# Patient Record
Sex: MALE | Race: WHITE | NOT HISPANIC OR LATINO | Employment: OTHER | URBAN - METROPOLITAN AREA
[De-identification: names, ages, dates, MRNs, and addresses within clinical notes are randomized per-mention and may not be internally consistent; named-entity substitution may affect disease eponyms.]

---

## 2017-01-18 ENCOUNTER — OFFICE VISIT (OUTPATIENT)
Dept: DERMATOLOGY | Facility: CLINIC | Age: 82
End: 2017-01-18
Payer: MEDICARE

## 2017-01-18 VITALS — RESPIRATION RATE: 14 BRPM | BODY MASS INDEX: 23.39 KG/M2 | WEIGHT: 149 LBS | HEIGHT: 67 IN

## 2017-01-18 DIAGNOSIS — L21.9 SEBORRHEIC DERMATITIS: ICD-10-CM

## 2017-01-18 DIAGNOSIS — Z87.2 HISTORY OF ACTINIC KERATOSES: ICD-10-CM

## 2017-01-18 DIAGNOSIS — D48.5 NEOPLASM OF UNCERTAIN BEHAVIOR OF SKIN: Primary | ICD-10-CM

## 2017-01-18 PROCEDURE — 88305 TISSUE EXAM BY PATHOLOGIST: CPT | Mod: 26,,, | Performed by: PATHOLOGY

## 2017-01-18 PROCEDURE — 1159F MED LIST DOCD IN RCRD: CPT | Mod: S$GLB,,, | Performed by: DERMATOLOGY

## 2017-01-18 PROCEDURE — 1160F RVW MEDS BY RX/DR IN RCRD: CPT | Mod: S$GLB,,, | Performed by: DERMATOLOGY

## 2017-01-18 PROCEDURE — 1157F ADVNC CARE PLAN IN RCRD: CPT | Mod: S$GLB,,, | Performed by: DERMATOLOGY

## 2017-01-18 PROCEDURE — 99999 PR PBB SHADOW E&M-EST. PATIENT-LVL II: CPT | Mod: PBBFAC,,, | Performed by: DERMATOLOGY

## 2017-01-18 PROCEDURE — 99213 OFFICE O/P EST LOW 20 MIN: CPT | Mod: 25,S$GLB,, | Performed by: DERMATOLOGY

## 2017-01-18 PROCEDURE — 1126F AMNT PAIN NOTED NONE PRSNT: CPT | Mod: S$GLB,,, | Performed by: DERMATOLOGY

## 2017-01-18 PROCEDURE — 11100 PR BIOPSY OF SKIN LESION: CPT | Mod: S$GLB,,, | Performed by: DERMATOLOGY

## 2017-01-18 PROCEDURE — 88305 TISSUE EXAM BY PATHOLOGIST: CPT | Performed by: PATHOLOGY

## 2017-01-18 NOTE — PROGRESS NOTES
Subjective:       Patient ID:  Kashmir Parikh is a 84 y.o. male who presents for   Chief Complaint   Patient presents with    Follow-up     HPI Comments: Pt here for 6 week follow up suspected Actinic cheilitis, left lower lip -treated with cryo at last visit on 10-, lesion has not resolved. In fact lesion is large. No history oral HSV.     Ak on nose treated with cryo at last visit has resolved    Seborrheic dermatitis, beard using head & shoulders shampoo twice weekly. He has only used ketoconazole shampoo & ketoconazole 2 % cream; a few times, much improved        No phx skin ca       FINAL PATHOLOGIC DIAGNOSIS  Benign squamous mucosa with hyperkeratosis AND FOCAL PIGMENT INCONTINENCE  Diagnosed by: Jacob Ness M.D.  (Electronically Signed: 2013-05-03 10:24:59)  Gross Description  ID LABEL: Kashmir Parikh  AP LABEL: Kashmir Parikh  Received in formalin labeled with the patient's ID, is a 0.5 cm fragment of rubbery, gray to white tissue. Bisected  and entirely submitted.  Carmen Pascual     Developed at left lower lip vermilion border in Jan-Feb 2013, and mentioned it to Dr. Hernandez in April 2013. In May 2013 Dr. Carvalho biopsied it, and path report revealed benign squamous mucosa with hyperkeratosis. It opened up again 3 months later with biting into po-boy and Dr. Carvalho cauterized it with AgNO3. He returns today for persistent lesion at this site for nearly 4 years now.                           Review of Systems   Skin: Negative for daily sunscreen use, activity-related sunscreen use and sensitivity to antibiotic ointment.   Hematologic/Lymphatic: Bruises/bleeds easily.        Objective:    Physical Exam   Constitutional: He appears well-developed and well-nourished. No distress.   HENT:   Mouth/Throat:       Eyes: Lids are normal.  No conjunctival no injection.   Neurological: He is alert and oriented to person, place, and time. He is not disoriented.   Psychiatric: He has a normal mood and  affect.   Skin:   Areas Examined (abnormalities noted in diagram):   Head / Face Inspection Performed  Neck Inspection Performed  Chest / Axilla Inspection Performed  Back Inspection Performed  RUE Inspected  LUE Inspection Performed         Diagram Legend     Erythematous scaling macule/papule c/w actinic keratosis       Vascular papule c/w angioma      Pigmented verrucoid papule/plaque c/w seborrheic keratosis      Yellow umbilicated papule c/w sebaceous hyperplasia      Irregularly shaped tan macule c/w lentigo     1-2 mm smooth white papules consistent with Milia      Movable subcutaneous cyst with punctum c/w epidermal inclusion cyst      Subcutaneous movable cyst c/w pilar cyst      Firm pink to brown papule c/w dermatofibroma      Pedunculated fleshy papule(s) c/w skin tag(s)      Evenly pigmented macule c/w junctional nevus     Mildly variegated pigmented, slightly irregular-bordered macule c/w mildly atypical nevus      Flesh colored to evenly pigmented papule c/w intradermal nevus       Pink pearly papule/plaque c/w basal cell carcinoma      Erythematous hyperkeratotic cursted plaque c/w SCC      Surgical scar with no sign of skin cancer recurrence      Open and closed comedones      Inflammatory papules and pustules      Verrucoid papule consistent consistent with wart     Erythematous eczematous patches and plaques     Dystrophic onycholytic nail with subungual debris c/w onychomycosis     Umbilicated papule    Erythematous-base heme-crusted tan verrucoid plaque consistent with inflamed seborrheic keratosis     Erythematous Silvery Scaling Plaque c/w Psoriasis     See annotation      Assessment / Plan:      Pathology Orders:      Normal Orders This Visit    Tissue Specimen To Pathology, Dermatology     Questions:    Directional Terms:  Other(comment)    Clinical information:  SCC vs other    Specific Site:  left lower lip        Neoplasm of uncertain behavior of skin  -     Tissue Specimen To Pathology,  Dermatology    Shave biopsy procedure note:    Shave biopsy performed after verbal consent including risk of infection, scar, recurrence, need for additional treatment of site. Area prepped with alcohol, anesthetized with approximately 1.0cc of 1% lidocaine with epinephrine. Lesional tissue shaved with razor blade. Hemostasis achieved with application of aluminum chloride followed by hyfrecation. No complications. Dressing applied. Wound care explained.        Seborrheic dermatitis, face  Improved  Ok to continue keto shampoo and cream      History of actinic keratoses  Resolved s/p cryo at last visit            Return in about 6 months (around 7/18/2017).

## 2017-02-01 ENCOUNTER — INITIAL CONSULT (OUTPATIENT)
Dept: DERMATOLOGY | Facility: CLINIC | Age: 82
End: 2017-02-01
Payer: MEDICARE

## 2017-02-01 VITALS
BODY MASS INDEX: 22.76 KG/M2 | SYSTOLIC BLOOD PRESSURE: 140 MMHG | WEIGHT: 145 LBS | HEIGHT: 67 IN | DIASTOLIC BLOOD PRESSURE: 60 MMHG

## 2017-02-01 DIAGNOSIS — C44.02 SQUAMOUS CELL CARCINOMA, LIP: Primary | ICD-10-CM

## 2017-02-01 PROCEDURE — 1160F RVW MEDS BY RX/DR IN RCRD: CPT | Mod: S$GLB,,, | Performed by: DERMATOLOGY

## 2017-02-01 PROCEDURE — 99999 PR PBB SHADOW E&M-EST. PATIENT-LVL III: CPT | Mod: PBBFAC,,, | Performed by: DERMATOLOGY

## 2017-02-01 PROCEDURE — 3077F SYST BP >= 140 MM HG: CPT | Mod: S$GLB,,, | Performed by: DERMATOLOGY

## 2017-02-01 PROCEDURE — 1159F MED LIST DOCD IN RCRD: CPT | Mod: S$GLB,,, | Performed by: DERMATOLOGY

## 2017-02-01 PROCEDURE — 99213 OFFICE O/P EST LOW 20 MIN: CPT | Mod: S$GLB,,, | Performed by: DERMATOLOGY

## 2017-02-01 PROCEDURE — 1157F ADVNC CARE PLAN IN RCRD: CPT | Mod: S$GLB,,, | Performed by: DERMATOLOGY

## 2017-02-01 PROCEDURE — 3078F DIAST BP <80 MM HG: CPT | Mod: S$GLB,,, | Performed by: DERMATOLOGY

## 2017-02-01 RX ORDER — CEPHALEXIN 500 MG/1
CAPSULE ORAL
Qty: 4 CAPSULE | Refills: 0 | Status: SHIPPED | OUTPATIENT
Start: 2017-02-01 | End: 2017-10-20

## 2017-02-01 NOTE — LETTER
February 1, 2017      Komal Green MD  [address] [facility] [address] [place] [address]           [facility]  [address] [facility] [address] [place] [address]  Phone: [phone]          Patient: Kashmir Parikh   MR Number: [id number]   YOB: 1932   Date of Visit: 2/1/2017       Dear Dr. Komal Grene:    Thank you for referring Kashmir Parikh to me for evaluation. Attached you will find relevant portions of my assessment and plan of care.    If you have questions, please do not hesitate to call me. I look forward to following Kashmir Parikh along with you.    Sincerely,    Sujit Abdalla MD    Enclosure  CC:  No Recipients    If you would like to receive this communication electronically, please contact [email or web address] or [phone] to request more information on [employer] access.    For providers and/or their staff who would like to refer a patient to [facility], please contact us through our one-stop-shop provider referral line, [facility], at [phone].    If you feel you have received this communication in error or would no longer like to receive these types of communications, please e-mail [email or web address]

## 2017-02-01 NOTE — PROGRESS NOTES
ALLERGIES:  Toradol [ketorolac]; Pork/porcine containing products; and Shrimp    CHIEF COMPLAINT:  This 84 y.o. 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 Consultation requested by Komal Green MD.    The patient is accompanied to this visit by his wife.    HISTORY OF PRESENT ILLNESS:   Location: lower lip  Duration: 6 month or more  Quality: persistent  Context: status post biopsy by Komal Green MD; path = squamous cell carcinoma; pathology accession #[id number], [facility]    Prior Treatment: none    See also the handwritten notes/diagrams scanned to chart for additional details.    Defibrillator: No  Pacemaker: No  Artificial heart valves: Yes - Date: 2012; Porcine valve  Artificial joints: Yes; bilateral knee    REVIEW OF SYSTEMS:   General: general health fair  Skin: has no previous history of skin cancer(s)  CV: has hypertension, porcine aortic valve; no chest pain; has CHF  Resp: has exertional shortness of breath  Endo: has no diabetes  Hem/Lymph: taking prescribed anticoagulants-ASA and plavix, has no easy bruising/bleeding  Allergy/Immuno: has allergies as noted above  GI: has no history of hepatitis  MS: as noted above    PAST MEDICAL HISTORY:  Past Medical History   Diagnosis Date    BPH (benign prostatic hypertrophy)     Cataract      OU    CHF (congestive heart failure) 3/9/2016    Coronary artery disease     Diverticulitis     Gout, arthritis     HEARING LOSS     Heart murmur      asymptomatic    HTN (hypertension)      Pt states resolved    Hyperlipidemia     Myocardial infarction     Recurrent nephrolithiasis        PAST SURGICAL HISTORY:  Past Surgical History   Procedure Laterality Date    Rotator cuff repair       bilateral    Cholecystectomy      Carpal tunnel release  06/06     bilateral    Subtotal colectomy      Spinal cord stimulator implant   11/15/2011    Joint replacement       bilateral knee    Eye surgery       retinal detachment x 2    Hernia repair       umbilical    Hand surgery      Colonoscopy      Aortic valve replacement  12/05/2012     #21 [employer] Mosaic tissue valve    Cardiac surgery      Coronary angioplasty with stent placement      Retinal detachment surgery Bilateral 1980     RD spontaneous OU        SOCIAL HISTORY:  Dependencies: smoking status as noted below  Social History   Substance Use Topics    Smoking status: Former Smoker     Years: 10.00     Quit date: 8/7/1962    Smokeless tobacco: Never Used    Alcohol use Yes      Comment: occasional       PERTINENT MEDICATIONS:  See medications list.  Current Outpatient Prescriptions on File Prior to Visit   Medication Sig Dispense Refill    acetaminophen (TYLENOL) 325 MG tablet Take 1 tablet (325 mg total) by mouth every 6 (six) hours as needed for Pain.      aspirin 81 MG Chew Take 81 mg by mouth once daily.      atorvastatin (LIPITOR) 40 MG tablet TAKE ONE TABLET BY MOUTH AT BEDTIME FOR CHOLESTEROL 90 tablet 5    benazepril (LOTENSIN) 5 MG tablet Take 1 tablet (5 mg total) by mouth once daily. 90 tablet 3    carvedilol (COREG) 3.125 MG tablet TAKE ONE TABLET BY MOUTH TWO TIMES A DAY AS DIRECTED 180 tablet 4    clopidogrel (PLAVIX) 75 mg tablet Take 1 tablet (75 mg total) by mouth once daily. 30 tablet 11    colchicine 0.6 mg tablet TAKE ONE CAPSULE BY MOUTH ONCE DAILY FOR GOUT 90 tablet 1    docusate sodium (COLACE) 100 MG capsule Take 100 mg by mouth as needed for Constipation.      ketoconazole (NIZORAL) 2 % cream aaa face twice daily prn scaling and redness 60 g 3    ketoconazole (NIZORAL) 2 % shampoo Wash face/beard area with medicated shampoo 3x/week 120 mL 5    multivitamin capsule Take 1 capsule by mouth once daily.      omega-3 fatty acids Cap Take 1 capsule by mouth 2 (two) times daily.       oxycodone-acetaminophen (PERCOCET) 7.5-325 mg per tablet  TAKE TWO TABLETS BY MOUTH EVERY 4 HOURS AS NEEDED FOR PAIN 90 tablet 0    polyethylene glycol (GLYCOLAX) 17 gram/dose powder MIX 17-GRAMS (ONE CAPFUL) WITH 8-OUNCES WATER, JUICE OR CLEAR FLUID AND DRINK ONCE DAILY UP TO FOUR TIMES A DAY AS NEEDED TO PREVENT/RELIEVE 527 g 11    tamsulosin (FLOMAX) 0.4 mg Cp24 TAKE ONE CAPSULE BY MOUTH ONCE DAILY 90 capsule 3    zolpidem (AMBIEN) 5 MG Tab Take 1 tablet (5 mg total) by mouth nightly. 90 tablet 0     No current facility-administered medications on file prior to visit.        ALLERGIES:  Toradol [ketorolac]; Pork/porcine containing products; and Shrimp    EXAM:  See also the handwritten notes/diagrams scanned to chart for additional details.  Constitutional  General appearance: well-developed, well-nourished, well-kempt elderly white male    Eyes  Inspection of conjunctivae and lids reveals no abnormalities; sclerae anicteric  Neurologic/Psychiatric  Alert,  normal orientation to time, place, person  Normal mood and affect with n evidence of depression, anxiety, agitation  Skin: see photo(s)  Head: background moderate solar damage to exposed areas of skin; in addition, inspection/palpation reveals an approximately 8 mm eschar on the lower lip vermilion, with an adjacent area of leukokeratosis; he confirmed this as the site of the prior biopsy  Neck: examination reveals moderate chronic solar damage  Right upper extremity: examination reveals moderate chronic solar damage; no ecchymosis   Left upper extremity: examination reveals moderate chronic solar damage; no ecchymosis   Lymphatic  Palpation of lymph nodes of submental, submandibular, and cervical areas bilaterally reveals no palpable adenopathy    ASSESSMENT: 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 In particular, the discussion included, but was not limited to, the following:    One alternative at this point would be to defer further treatment and observe the lesion. With small skin cancers of this kind, it is possible that a biopsy can be sufficient to definitively treat a small skin cancer of this kind. Alternatively, some skin cancers are slow growing and do not require immediate treatment. The potential advantage of this choice would be to avoid the need for possibly unnecessary additional surgery. Among the potential disadvantages of this would be the possibility of enlargement of the lesion, more extensive spread of the lesion or recurrence at a later date, which might necessitate a larger and more complex surgery.    Radiation treatment can be an effective treatment for this type of skin cancer. The usual course of treatment is every weekday for several weeks. Local irritation will result from treatment, although no systemic side effects are expected. The potential advantage of radiation treatment is that it avoids the need for surgery. Among the disadvantages of radiation treatment are the length of treatment, the local inflammatory response, the absence of pathologic confirmation of the removal of the skin cancer, a possible increased risk of additional skin cancer in the treated area in later years, and a somewhat increased risk of recurrence at a later date.     Excisional surgery can be an effective treatment for this type of skin cancer. This would involve excision of the lesion with margin evaluation by submitting the specimen to a pathologist for either immediate marginal assessment via frozen section processing, or delayed marginal assessment  by fixed-tissue processing. The potential advantage of this technique is that it offers a way of treating the lesion with some degree of histologic confirmation of tumor removal. Among the disadvantages of this treatment are the possible need for re-excision if marginal involvement is identified, a somewhat greater likelihood of recurrence as compared to Mohs' surgery because of the less comprehensive margin evaluation inherent in the technique, and the general potential risks of surgery, including allergic reactions to the anesthetic and other materials used, infection, injury to nerves in the area with consequent loss of sensation or muscle function, and scarring or distortion of surrounding structures.    Mohs' surgery is a very effective treatment for this type of skin cancer. The potential advantage of Mohs' surgery is that this technique offers the greatest possible certainty of knowing that the skin cancer has been completely removed, with the removal of the least amount of normal tissue. The potential disadvantages of Mohs' surgery include the duration of the surgery, the possible need for a separate surgery for reconstruction following tumor removal, and scarring as a result. In addition, general potential risks of surgery as noted above also apply to treatment via Mohs' surgery.    In light of the nature of this tumor and the location on the lipin an area of increased risk of recurrence,  Mohs' micrographic surgery was thought to be the most appropriate management choice, and this diagnosis is appropriate for treatment by Mohs' micrographic surgery.     Sufficient time was available for questions, and all questions were answered to his satisfaction. He fully understands the aims, risks, alternatives, and possible complications, and has elected to proceed with the surgery, and verbally consented to do so. The procedure will be scheduled in the near future.    Routine pre-op instructions were given to  him.    Given his history of valve replacement and the location on the lip margin, I will prescribe him cephalexin 2gm to take one hour prior to surgery.     The patient was instructed to continue ASA and plavix prior to surgery.    --------------------------------------  Note: some or all of this note may have been generated using voice recognition software and thus may contain grammatical and/or spelling errors.

## 2017-02-03 RX ORDER — OXYCODONE AND ACETAMINOPHEN 7.5; 325 MG/1; MG/1
TABLET ORAL
Qty: 90 TABLET | Refills: 0 | Status: SHIPPED | OUTPATIENT
Start: 2017-02-03 | End: 2017-03-22 | Stop reason: SDUPTHER

## 2017-02-10 ENCOUNTER — PROCEDURE VISIT (OUTPATIENT)
Dept: DERMATOLOGY | Facility: CLINIC | Age: 82
End: 2017-02-10
Payer: MEDICARE

## 2017-02-10 VITALS
DIASTOLIC BLOOD PRESSURE: 49 MMHG | BODY MASS INDEX: 21.98 KG/M2 | WEIGHT: 145 LBS | HEIGHT: 68 IN | SYSTOLIC BLOOD PRESSURE: 107 MMHG | HEART RATE: 68 BPM

## 2017-02-10 DIAGNOSIS — C44.02 SQUAMOUS CELL CARCINOMA, LIP: Primary | ICD-10-CM

## 2017-02-10 PROCEDURE — 17312 MOHS ADDL STAGE: CPT | Mod: S$GLB,,, | Performed by: DERMATOLOGY

## 2017-02-10 PROCEDURE — 99499 UNLISTED E&M SERVICE: CPT | Mod: S$GLB,,, | Performed by: DERMATOLOGY

## 2017-02-10 PROCEDURE — 17311 MOHS 1 STAGE H/N/HF/G: CPT | Mod: S$GLB,,, | Performed by: DERMATOLOGY

## 2017-02-10 PROCEDURE — 40500 PARTIAL EXCISION OF LIP: CPT | Mod: 51,S$GLB,, | Performed by: DERMATOLOGY

## 2017-02-10 NOTE — PROGRESS NOTES
ALLERGIES:  Toradol [ketorolac]; Pork/porcine containing products; and Shrimp  -------------------------------------------------------------  PROCEDURE: Mohs' Micrographic Surgery    SITE: lower lip    INDICATION: squamous cell carcinoma in an area at increased risk of recurrence    CASE NUMBER: [id number]      ANESTHETIC: 4.0 mL 1% Lidocaine with Epinephrine 1:100,000, buffered    SURGICAL PREP: Ethanol and Hibiclens    SURGEON: Sujit Abdalla MD    ASSISTANTS: Candy Alexander CST     PREOPERATIVE DIAGNOSIS: squamous cell carcinoma     POSTOPERATIVE DIAGNOSIS: squamous cell carcinoma     PATHOLOGIC DIAGNOSIS: squamous cell carcinoma     STAGES OF MOHS' SURGERY PERFORMED: two    TUMOR-FREE PLANE ACHIEVED: yes    HEMOSTASIS: Hyfrecation     SPECIMENS: three (one in stage A, two in stage B)    INITIAL LESION SIZE: 0.7 x 1.6 cm    FINAL DEFECT SIZE: 1.1 x 3.9 cm    WOUND REPAIR/DISPOSITION: see below    NARRATIVE:    The patient is a 84 y.o.male referred by Komal Green MD with a history of cancer on the left lower lip which was biopsied - pathology accession #[id number], [facility]. Findings revealed squamous cell carcinoma. Examination revealed a somewhat sclerotic plaque on the left lower vermilion of the lip at the site of prior biopsy, which was confirmed by reference to the photograph taken at the previous patient visit; to the right of this is an area of leukokeratosis extending across the vermilion. In light of the nature of this tumor and the location on the lip, Mohs' micrographic surgery was thought to be the most appropriate management choice, and this diagnosis is appropriate for treatment by Mohs' micrographic surgery.  I discussed it with the patient and he fully understands the aims, risks, alternatives, and possible complications, and elects to proceed.  There are no medical or surgical contraindications to the procedure.     A signed informed consent was obtained.    PROCEDURE:  The  "patient was placed in the semi-recumbent position on the operating table in the Mohs' Surgery Suite. The area in question was thoroughly prepped with ethanol and Hibiclens. A sterile surgical marker was used to outline the clinically apparent margins of the involved area, and a narrow margin of normal-appearing skin. Reference marks were made at the periphery of the outlined area with the surgical marker. The proposed area of excision was measured and photographed. Local anesthesia of 1% Lidocaine with 1:100,000 epinephrine, buffered with sodium bicarbonate, was administered.  The total volume of anesthetic used throughout this portion of the procedure was as documented above. The area was prepared and draped in the standard manner. All of the grossly identifiable area of clinically abnormal tissue and an underlying/peripheral layer was taken and processed by the Mohs' technique.  Hemostasis was obtained with the hyfrecator. Tissue was taken from any areas of residual marginal involvement (if present) and processed by the Mohs' technique in as many stages as needed until a tumor-free plane was achieved.    Colors of inks used in the reference nicks at epidermal margins (if present) and/or inking of non-epithelial edges, if applicable, is represented on the Mohs map as follows: solid lines represent red ink, dots represent blue ink, jagged lines represent black ink, curlicues represent green ink, "xxx" represents yellow ink.    The first Mohs' layer consisted of one section(s) with 3 slide(s) evaluated. Histology of the specimen(s) showed some residual scar tissue on the mucosal side near the red-inked nick, and an adjacent tissue tear, as noted on the Mohs map; on the opposite site, adjacen to the black nick, there was some residual superficially invasive squamous cell carcinoma vs actinic cheilitis.     The second Mohs' layer consisted of two section(s) with 5 slide(s) evaluated. No residual tumor was noted at the " margins of the second Mohs' layer. Histology of the specimen(s) showed chronic solar changes.    A total of three section(s) and 8 slide(s) were examined under the microscope via the Mohs technique.  A cancer free plane was reached after layer number two. Defect final size was as noted above.      The wound was covered with a nonadherent dressing between stages, and the patient allowed to wait in the waiting area during these periods. The final defect was photographed at the completion of the Mohs' procedure.    See the separate procedure note which follows regarding repair of the defect following Mohs' surgery.       -----------------------------------------------    REPAIR FOLLOWING MOHS' MICROGRAPHIC SURGERY    PREOPERATIVE DIAGNOSIS: defect following Mohs' surgery for a squamous cell carcinoma    POSTOPERATIVE DIAGNOSIS: same    PROCEDURE PERFORMED: vermilionectomy of residual vermilion and mucosal advancement    ANESTHETIC: 3 mL 1% Lidocaine with Epinephrine 1:100,000, buffered     SURGICAL PREP: Hibiclens    SURGEON: Sujit Abdalla MD     ASSISTANTS: as above    LOCATION: lower lip      INDICATIONS:  Earlier in the day, the patient underwent Mohs' micrographic surgical excision of a squamous cell carcinoma on the vermilion of the lower lip. Tumor free margins were achieved after layer number two.  Later in the day, the management of the resulting wound was addressed with the patient. I discussed the various wound management options with the patient and he fully understands the aims, risks, alternatives, and possible complications of the alternatives, and he elects to proceed with closure of the defect in the manner noted below.  There are no medical or surgical contraindications to the procedure.    A signed informed consent was previously obtained.    PROCEDURE:  Repair via vermilionectomy and mucosal advancement closure:  The patient was returned to the procedure room following completion of the Mohs'  procedure and final slide review. Because of the size, shape and location of the defect, simple closure could not be achieved without possible distortion of surrounding structures, excessive tension on the wound margins and an unacceptable risk of wound dehiscence, and the creation of standing cone deformities.     Consideration was given the the site of the wound, the surrounding structures, and the orientation of closure necessary to provide the optimal functional and cosmetic outcome. After devoting time to these considerations, and to the orientation of the vectors of maximal skin tension surrounding the defect, and after discussing the options for wound management, a completion vermilionectomy and mucosal advancement flap was chosen to repair the area. The area was prepped again and triangles of residual vermilion on the exposed mucosal lip were outlined with a sterile surgical marker, to minimize tension across the wound. Additional anesthetic was infiltrated into the tissues surrounding the defect and the anticipated area of repair, to maintain anesthesia during the procedure.Preparation of the site for closure was then carried out by extending the defect through excision of triangles of superfluous tissue on either side of the wound to square the shoulders of the defect and to allow closure without distortion by standing cone deformities, to achieve removal of the entire exposed vermilion of the lower lip. The mucosal margin of the wound was undermined sufficiently to permit advancement of the mucosal lip to the vermilion border/cutaneous lip margin. After hemostasis was achieved with the hyfrecator, closure was accomplished with:      multiple #4-0 buried interrupted Vicryl suture(s) and    multiple #4-0 simple interrupted silk suture(s) for final approximation of the wound margins.    The site was photographed following completion of the repair. Final dressing consisted of petrolatum, Telfa and  tape.    Estimated blood loss for the total procedure was less than 10 mL.    Total operative time including tissue processing in the Mohs' laboratory and microscopic Mohs' frozen section slide review was 3 hour(s). Verbal and written wound care instructions were given to the patient, and he expressed understanding of these instructions. The patient tolerated the procedure well and left the operating room in good condition; he is to return in 7 days for suture removal.     Medications previously prescribed for the patient were: cephalexin 2 gm, which he took one hour prior to the procedure.    Dr. Abdalla's cell phone number was given to the patient with instructions to call prn with any problems.

## 2017-02-17 ENCOUNTER — OFFICE VISIT (OUTPATIENT)
Dept: DERMATOLOGY | Facility: CLINIC | Age: 82
End: 2017-02-17
Payer: MEDICARE

## 2017-02-17 DIAGNOSIS — Z48.02 VISIT FOR SUTURE REMOVAL: Primary | ICD-10-CM

## 2017-02-17 PROCEDURE — 99024 POSTOP FOLLOW-UP VISIT: CPT | Mod: S$GLB,,, | Performed by: DERMATOLOGY

## 2017-02-17 PROCEDURE — 99999 PR PBB SHADOW E&M-EST. PATIENT-LVL III: CPT | Mod: PBBFAC,,, | Performed by: DERMATOLOGY

## 2017-02-17 NOTE — PROGRESS NOTES
CC: 84 y.o.male patient is here for suture removal.     HPI: Patient is one week(s) s/p Mohs' micrographic surgery, fresh tissue technique of a squamous cell carcinoma on the lower lip, with subsequent repair   Patient reports no problems.    EXAM:  Sutures intact.  Wound healing well.  Good approximation of skin edges.  No undue erythema to surrounding skin or signs or symptoms of infection.    IMPRESSION:  Healing well post Mohs' micrographic surgery and repair    PLAN:  Site cleaned with peroxide, sutures removed  Dressed with petrolatum   Reviewed further care and expected course  Followup 4 weeks; call prn sooner

## 2017-02-24 ENCOUNTER — OFFICE VISIT (OUTPATIENT)
Dept: CARDIOLOGY | Facility: CLINIC | Age: 82
End: 2017-02-24
Payer: MEDICARE

## 2017-02-24 VITALS
BODY MASS INDEX: 22.02 KG/M2 | HEIGHT: 68 IN | DIASTOLIC BLOOD PRESSURE: 51 MMHG | HEART RATE: 61 BPM | SYSTOLIC BLOOD PRESSURE: 118 MMHG | WEIGHT: 145.31 LBS

## 2017-02-24 DIAGNOSIS — I77.9 BILATERAL CAROTID ARTERY DISEASE: ICD-10-CM

## 2017-02-24 DIAGNOSIS — I10 ESSENTIAL HYPERTENSION: ICD-10-CM

## 2017-02-24 DIAGNOSIS — E78.5 DYSLIPIDEMIA: ICD-10-CM

## 2017-02-24 DIAGNOSIS — Z95.2 S/P AVR (AORTIC VALVE REPLACEMENT): Primary | ICD-10-CM

## 2017-02-24 DIAGNOSIS — I25.10 CORONARY ARTERY DISEASE DUE TO LIPID RICH PLAQUE: ICD-10-CM

## 2017-02-24 DIAGNOSIS — I25.83 CORONARY ARTERY DISEASE DUE TO LIPID RICH PLAQUE: ICD-10-CM

## 2017-02-24 DIAGNOSIS — Z95.1 S/P CABG X 1: ICD-10-CM

## 2017-02-24 PROCEDURE — 99214 OFFICE O/P EST MOD 30 MIN: CPT | Mod: S$GLB,,, | Performed by: INTERNAL MEDICINE

## 2017-02-24 PROCEDURE — 1126F AMNT PAIN NOTED NONE PRSNT: CPT | Mod: S$GLB,,, | Performed by: INTERNAL MEDICINE

## 2017-02-24 PROCEDURE — 3074F SYST BP LT 130 MM HG: CPT | Mod: S$GLB,,, | Performed by: INTERNAL MEDICINE

## 2017-02-24 PROCEDURE — 1159F MED LIST DOCD IN RCRD: CPT | Mod: S$GLB,,, | Performed by: INTERNAL MEDICINE

## 2017-02-24 PROCEDURE — 3078F DIAST BP <80 MM HG: CPT | Mod: S$GLB,,, | Performed by: INTERNAL MEDICINE

## 2017-02-24 PROCEDURE — 99999 PR PBB SHADOW E&M-EST. PATIENT-LVL III: CPT | Mod: PBBFAC,,, | Performed by: INTERNAL MEDICINE

## 2017-02-24 PROCEDURE — 1157F ADVNC CARE PLAN IN RCRD: CPT | Mod: S$GLB,,, | Performed by: INTERNAL MEDICINE

## 2017-02-24 PROCEDURE — 99499 UNLISTED E&M SERVICE: CPT | Mod: S$GLB,,, | Performed by: INTERNAL MEDICINE

## 2017-02-24 PROCEDURE — 1160F RVW MEDS BY RX/DR IN RCRD: CPT | Mod: S$GLB,,, | Performed by: INTERNAL MEDICINE

## 2017-02-24 NOTE — PROGRESS NOTES
Subjective:    Patient ID:  Kashmir Parikh is a 84 y.o. male who presents for follow-up of Hypertension (9 month f/u ); Congestive Heart Failure; and Coronary Artery Disease      HPI   Here for f/u of AVR/CABG. Patients states is doing well no chest pain, SOB or change in exertional tolerence. Patient does not exercise but remains very active with out change in exertional tolerance or chest pain. Stable HANSEN.    Review of Systems   Constitution: Negative for decreased appetite and malaise/fatigue.   HENT: Negative for congestion and nosebleeds.    Eyes: Negative for blurred vision.   Cardiovascular: Negative for chest pain, claudication, cyanosis, dyspnea on exertion, irregular heartbeat, leg swelling, near-syncope, orthopnea, palpitations, paroxysmal nocturnal dyspnea and syncope.   Respiratory: Negative for cough and shortness of breath.    Endocrine: Negative for polyuria.   Hematologic/Lymphatic: Does not bruise/bleed easily.   Musculoskeletal: Negative for back pain, falls, joint pain, joint swelling, muscle cramps, muscle weakness and myalgias.   Gastrointestinal: Negative for bloating, abdominal pain, change in bowel habit, nausea and vomiting.   Genitourinary: Negative for urgency.   Neurological: Negative for dizziness, focal weakness and light-headedness.   Psychiatric/Behavioral: Negative for altered mental status.        Objective:    Physical Exam   Cardiovascular:   Murmur heard.   Harsh midsystolic murmur is present with a grade of 2/6  at the upper right sternal border radiating to the neck  Well healed midline sternal incision.               ..    Chemistry        Component Value Date/Time     03/18/2016 0606    K 4.0 03/18/2016 0606     03/18/2016 0606    CO2 31 (H) 03/18/2016 0606    BUN 21 03/18/2016 0606    CREATININE 0.76 03/18/2016 0606     (H) 03/18/2016 0606        Component Value Date/Time    CALCIUM 9.9 03/18/2016 0606    ALKPHOS 81 03/08/2016 2105    AST 28 03/08/2016  2105    ALT 26 03/08/2016 2105    BILITOT 0.5 03/08/2016 2105            ..  Lab Results   Component Value Date    CHOL 154 08/18/2015    CHOL 138 09/02/2014    CHOL 143 10/01/2013     Lab Results   Component Value Date    HDL 37 (L) 08/18/2015    HDL 35 (L) 09/02/2014    HDL 32 (L) 10/01/2013     Lab Results   Component Value Date    LDLCALC 89.0 08/18/2015    LDLCALC 68.2 09/02/2014    LDLCALC 87.6 10/01/2013     Lab Results   Component Value Date    TRIG 140 08/18/2015    TRIG 174 (H) 09/02/2014    TRIG 117 10/01/2013     Lab Results   Component Value Date    CHOLHDL 24.0 08/18/2015    CHOLHDL 25.4 09/02/2014    CHOLHDL 22.4 10/01/2013     ..  Lab Results   Component Value Date    WBC 6.42 03/18/2016    HGB 13.7 (L) 03/18/2016    HCT 41.6 03/18/2016    MCV 95 03/18/2016     03/18/2016       Test(s) Reviewed  I have reviewed the following in detail:  [] Stress test   [] Angiography   [x] Echocardiogram   [x] Labs   [] Other:       Assessment:         ICD-10-CM ICD-9-CM   1. S/P AVR (aortic valve replacement) Z95.2 V43.3   2. S/P CABG x 1 Z95.1 V45.81   3. Dyslipidemia E78.5 272.4   4. Essential hypertension I10 401.9   5. Bilateral carotid artery disease I77.9 447.9   6. Coronary artery disease due to lipid rich plaque I25.10 414.00    I25.83 414.3     Problem List Items Addressed This Visit     Bilateral carotid artery disease    CAD (coronary artery disease)    Overview     4/2016 failed  of LAD  12/12 LAD-35%, OM 75%, small RCA         Dyslipidemia    Essential hypertension    S/P AVR (aortic valve replacement) - Primary    Overview     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 mosaic         S/P CABG x 1    Overview     12/12 VG-OM                Plan:           Return to clinic 6 months   Low level/low impact aerobic exercise 5x's/wk. Heart healthy diet and risk factor modification.    See labs and med orders.  Orders Placed This Encounter   Procedures    Lipid panel    Comprehensive metabolic panel    CBC auto  differential    TSH    CAR Ultrasound doppler carotid bliateral    2D echo with color flow doppler

## 2017-02-24 NOTE — MR AVS SNAPSHOT
[facility]  [address] [facility] [address]  [facility] [address]  Phone: [phone]                  Kashmir Parikh   2017 11:20 AM   Office Visit    Description:  Male : 10/18/1932   Provider:  Tanner Galvan MD   Department:  [place] [facility]           Reason for Visit     Hypertension     Congestive Heart Failure     Coronary Artery Disease           Diagnoses this Visit        Comments    S/P AVR (aortic valve replacement)    -  Primary     S/P CABG x 1         Dyslipidemia         Essential hypertension         Bilateral carotid artery disease         Coronary artery disease due to lipid rich plaque                To Do List           Future Appointments        Provider Department Dept Phone    3/6/2017 2:00 PM ARGELIA Natarajan MD [facility] [phone]    3/15/2017 3:15 PM Sujit Abdalla MD [facility] [phone]    2017 11:00 AM ANALILIA Hassan OD [facility] [facility] [phone]    2017 4:30 PM Orlin Mcclure Jr., MD [facility] [phone]      Goals (5 Years of Data)     None      Follow-Up and Disposition     Return in about 6 months (around 2017).      [facility]     [facility] -  Assistance  Registered nurses in the [facility] provide clinical advisement, health education, appointment booking, and other advisory services.  Call for this free service at [phone].             Medications           Message regarding Medications     Verify the changes and/or additions to your medication regime listed below are the same as discussed with your clinician today.  If any of these changes or additions are incorrect, please notify your healthcare provider.             Verify that the below list of medications is an accurate representation of the medications you are currently taking.  If none reported, the list may be blank. If incorrect, please contact your healthcare  provider. Carry this list with you in case of emergency.           Current Medications     acetaminophen (TYLENOL) 325 MG tablet Take 1 tablet (325 mg total) by mouth every 6 (six) hours as needed for Pain.    aspirin 81 MG Chew Take 81 mg by mouth once daily.    atorvastatin (LIPITOR) 40 MG tablet TAKE ONE TABLET BY MOUTH AT BEDTIME FOR CHOLESTEROL    benazepril (LOTENSIN) 5 MG tablet Take 1 tablet (5 mg total) by mouth once daily.    carvedilol (COREG) 3.125 MG tablet TAKE ONE TABLET BY MOUTH TWO TIMES A DAY AS DIRECTED    cephALEXin (KEFLEX) 500 MG capsule Take four by mouth one hour prior to surgery    clopidogrel (PLAVIX) 75 mg tablet Take 1 tablet (75 mg total) by mouth once daily.    colchicine 0.6 mg tablet TAKE ONE CAPSULE BY MOUTH ONCE DAILY FOR GOUT    docusate sodium (COLACE) 100 MG capsule Take 100 mg by mouth as needed for Constipation.    ketoconazole (NIZORAL) 2 % cream aaa face twice daily prn scaling and redness    ketoconazole (NIZORAL) 2 % shampoo Wash face/beard area with medicated shampoo 3x/week    multivitamin capsule Take 1 capsule by mouth once daily.    omega-3 fatty acids Cap Take 1 capsule by mouth 2 (two) times daily.     oxycodone-acetaminophen (PERCOCET) 7.5-325 mg per tablet TAKE TWO TABLETS BY MOUTH EVERY 4 HOURS AS NEEDED FOR PAIN    polyethylene glycol (GLYCOLAX) 17 gram/dose powder MIX 17-GRAMS (ONE CAPFUL) WITH 8-OUNCES WATER, JUICE OR CLEAR FLUID AND DRINK ONCE DAILY UP TO FOUR TIMES A DAY AS NEEDED TO PREVENT/RELIEVE    tamsulosin (FLOMAX) 0.4 mg Cp24 TAKE ONE CAPSULE BY MOUTH ONCE DAILY    zolpidem (AMBIEN) 5 MG Tab Take 1 tablet (5 mg total) by mouth nightly.           Clinical Reference Information           Your Vitals Were     BP                   118/51 (BP Location: Left arm, Patient Position: Sitting, BP Method: Automatic)           Blood Pressure          Most Recent Value    BP  (!)  118/51      Allergies as of 2/24/2017     Toradol [Ketorolac]    Pork/porcine  Containing Products    Shrimp      Immunizations Administered on Date of Encounter - 2/24/2017     None      Orders Placed During Today's Visit     Future Labs/Procedures Expected by Expires    2D echo with color flow doppler  5/24/2017 2/25/2018    CAR Ultrasound doppler carotid bliateral  5/24/2017 2/24/2018    CBC auto differential  5/24/2017 4/25/2018    Comprehensive metabolic panel  5/24/2017 2/25/2018    Lipid panel  5/24/2017 2/25/2018    TSH  5/24/2017 2/25/2018      Language Assistance Services     ATTENTION: Language assistance services are available, free of charge. Please call [phone].      ATENCIÓN: Si habla español, tiene a meehan disposición servicios gratuitos de asistencia lingüística. Llame al [phone].     CHÚ Ý: N?u b?n nói Ti?ng Vi?t, có các d?ch v? h? tr? ngôn ng? mi?n phí dành cho b?n. G?i s? [phone].         [facility] complies with applicable Federal civil rights laws and does not discriminate on the basis of race, color, national origin, age, disability, or sex.

## 2017-03-06 ENCOUNTER — OFFICE VISIT (OUTPATIENT)
Dept: OPHTHALMOLOGY | Facility: CLINIC | Age: 82
End: 2017-03-06
Payer: MEDICARE

## 2017-03-06 DIAGNOSIS — H35.3220 EXUDATIVE AGE-RELATED MACULAR DEGENERATION OF LEFT EYE: Primary | ICD-10-CM

## 2017-03-06 DIAGNOSIS — H33.003 RETINAL DETACHMENT OF BOTH EYES WITH RETINAL BREAK: ICD-10-CM

## 2017-03-06 DIAGNOSIS — H25.13 NS (NUCLEAR SCLEROSIS), BILATERAL: ICD-10-CM

## 2017-03-06 DIAGNOSIS — H43.11 VITREOUS HEMORRHAGE, RIGHT EYE: ICD-10-CM

## 2017-03-06 PROCEDURE — 99999 PR PBB SHADOW E&M-EST. PATIENT-LVL III: CPT | Mod: PBBFAC,,, | Performed by: OPHTHALMOLOGY

## 2017-03-06 PROCEDURE — 92134 CPTRZ OPH DX IMG PST SGM RTA: CPT | Mod: S$GLB,,, | Performed by: OPHTHALMOLOGY

## 2017-03-06 PROCEDURE — 99499 UNLISTED E&M SERVICE: CPT | Mod: S$GLB,,, | Performed by: OPHTHALMOLOGY

## 2017-03-06 PROCEDURE — 92014 COMPRE OPH EXAM EST PT 1/>: CPT | Mod: S$GLB,,, | Performed by: OPHTHALMOLOGY

## 2017-03-06 PROCEDURE — 92226 PR SPECIAL EYE EXAM, SUBSEQUENT: CPT | Mod: 50,S$GLB,, | Performed by: OPHTHALMOLOGY

## 2017-03-06 NOTE — PROGRESS NOTES
OCT - OD - thinning  OS - PP HE improving    FA - NO active CNVM OS - stable fibrosis with staining    A/P      1. RD OD  S/p repair with Dr. Alexandre 1980's  With SB segment, cryo/laser  Internalized mersilene suture  Also with VH - resolving    RD precautions    2. VH OD   ?from around mersilene  Although BP was elevated and sneezing    Will check FA on FU    3. S/p Cryo OS    4. NS OU  Pt comfortable currently    5. Wet AMD OS  With PPCNMV - not currently foveal threatening, with some fibrosis, will monitor        6 months OCT

## 2017-03-06 NOTE — MR AVS SNAPSHOT
[place] [facility]  [address]  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         Vitreous hemorrhage, right eye         NS (nuclear sclerosis), bilateral                To Do List           Future Appointments        Provider Department Dept Phone    3/15/2017 3:15 PM Sujit Abdalla MD [facility] [phone]    2017 11:00 AM ANALILIA Hassan OD [facility] Opt[facility] [phone]    2017 4:30 PM Orlin Mcclure Jr., MD [facility] [phone]    2017 8:35 AM LAB[facility] [place] [facility] [phone]    2017 1:00 PM VASCULAR [facility] [phone]      Goals (5 Years of Data)     None      Follow-Up and Disposition     Return in about 6 months (around 2017).      [facility]     [facility] -  Assistance  Registered nurses in the [facility] provide clinical advisement, health education, appointment booking, and other advisory services.  Call for this free service at [phone].             Medications           Message regarding Medications     Verify the changes and/or additions to your medication regime listed below are the same as discussed with your clinician today.  If any of these changes or additions are incorrect, please notify your healthcare provider.             Verify that the below list of medications is an accurate representation of the medications you are currently taking.  If none reported, the list may be blank. If incorrect, please  contact your healthcare provider. Carry this list with you in case of emergency.           Current Medications     acetaminophen (TYLENOL) 325 MG tablet Take 1 tablet (325 mg total) by mouth every 6 (six) hours as needed for Pain.    aspirin 81 MG Chew Take 81 mg by mouth once daily.    atorvastatin (LIPITOR) 40 MG tablet TAKE ONE TABLET BY MOUTH AT BEDTIME FOR CHOLESTEROL    benazepril (LOTENSIN) 5 MG tablet Take 1 tablet (5 mg total) by mouth once daily.    carvedilol (COREG) 3.125 MG tablet TAKE ONE TABLET BY MOUTH TWO TIMES A DAY AS DIRECTED    cephALEXin (KEFLEX) 500 MG capsule Take four by mouth one hour prior to surgery    clopidogrel (PLAVIX) 75 mg tablet Take 1 tablet (75 mg total) by mouth once daily.    colchicine 0.6 mg tablet TAKE ONE CAPSULE BY MOUTH ONCE DAILY FOR GOUT    docusate sodium (COLACE) 100 MG capsule Take 100 mg by mouth as needed for Constipation.    ketoconazole (NIZORAL) 2 % cream aaa face twice daily prn scaling and redness    ketoconazole (NIZORAL) 2 % shampoo Wash face/beard area with medicated shampoo 3x/week    multivitamin capsule Take 1 capsule by mouth once daily.    omega-3 fatty acids Cap Take 1 capsule by mouth 2 (two) times daily.     oxycodone-acetaminophen (PERCOCET) 7.5-325 mg per tablet TAKE TWO TABLETS BY MOUTH EVERY 4 HOURS AS NEEDED FOR PAIN    polyethylene glycol (GLYCOLAX) 17 gram/dose powder MIX 17-GRAMS (ONE CAPFUL) WITH 8-OUNCES WATER, JUICE OR CLEAR FLUID AND DRINK ONCE DAILY UP TO FOUR TIMES A DAY AS NEEDED TO PREVENT/RELIEVE    tamsulosin (FLOMAX) 0.4 mg Cp24 TAKE ONE CAPSULE BY MOUTH ONCE DAILY    zolpidem (AMBIEN) 5 MG Tab Take 1 tablet (5 mg total) by mouth nightly.           Clinical Reference Information           Allergies as of 3/6/2017     Toradol [Ketorolac]    Pork/porcine Containing Products    Shrimp      Immunizations Administered on Date of Encounter - 3/6/2017     None      Orders Placed During Today's Visit      Normal Orders This Visit     Posterior Segment OCT Retina-Both eyes     Future Labs/Procedures Expected by Expires    Posterior Segment OCT Retina-Both eyes  As directed 3/6/2018      Language Assistance Services     ATTENTION: Language assistance services are available, free of charge. Please call [phone].      ATENCIÓN: Si jose dubose, tiene a meehan disposición servicios gratuitos de asistencia lingüística. Llame al [phone].     CHÚ Ý: N?u b?n nói Ti?ng Vi?t, có các d?ch v? h? tr? ngôn ng? mi?n phí dành cho b?n. G?i s? [phone].         [facility] [facility] complies with applicable Federal civil rights laws and does not discriminate on the basis of race, color, national origin, age, disability, or sex.

## 2017-03-15 ENCOUNTER — TELEPHONE (OUTPATIENT)
Dept: DERMATOLOGY | Facility: CLINIC | Age: 82
End: 2017-03-15

## 2017-03-22 NOTE — TELEPHONE ENCOUNTER
----- Message from Jessica Mccall sent at 3/22/2017  3:15 PM CDT -----  Contact: [phone]    Calling to  Get a  Refill  On  pericet  7.5 /// please call   [facility] - [place] - [address]  [address]  [place] [address]  Phone: [phone] Fax: [phone]

## 2017-03-24 RX ORDER — OXYCODONE AND ACETAMINOPHEN 7.5; 325 MG/1; MG/1
TABLET ORAL
Qty: 90 TABLET | Refills: 0 | Status: SHIPPED | OUTPATIENT
Start: 2017-03-24 | End: 2017-05-15 | Stop reason: SDUPTHER

## 2017-03-27 RX ORDER — CLOPIDOGREL BISULFATE 75 MG/1
75 TABLET ORAL DAILY
Qty: 30 TABLET | Refills: 3 | Status: SHIPPED | OUTPATIENT
Start: 2017-03-27 | End: 2017-06-26 | Stop reason: SDUPTHER

## 2017-03-27 RX ORDER — BENAZEPRIL HYDROCHLORIDE 5 MG/1
5 TABLET ORAL DAILY
Qty: 30 TABLET | Refills: 3 | Status: SHIPPED | OUTPATIENT
Start: 2017-03-27 | End: 2017-06-19 | Stop reason: SDUPTHER

## 2017-04-05 ENCOUNTER — OFFICE VISIT (OUTPATIENT)
Dept: DERMATOLOGY | Facility: CLINIC | Age: 82
End: 2017-04-05
Payer: MEDICARE

## 2017-04-05 DIAGNOSIS — Z85.828 HISTORY OF MOH'S MICROGRAPHIC SURGERY FOR SKIN CANCER: Primary | ICD-10-CM

## 2017-04-05 DIAGNOSIS — Z98.890 HISTORY OF MOH'S MICROGRAPHIC SURGERY FOR SKIN CANCER: Primary | ICD-10-CM

## 2017-04-05 PROCEDURE — 99024 POSTOP FOLLOW-UP VISIT: CPT | Mod: S$GLB,,, | Performed by: DERMATOLOGY

## 2017-04-05 PROCEDURE — 99999 PR PBB SHADOW E&M-EST. PATIENT-LVL III: CPT | Mod: PBBFAC,,, | Performed by: DERMATOLOGY

## 2017-04-05 NOTE — PROGRESS NOTES
CC: 84 y.o.male patient is here for followup     HPI: Patient is 7-8 week(s) s/p Mohs' micrographic surgery, fresh tissue technique, of a squamous cell carcinoma on the lower lip; with subsequent repair by mucosal advancement  Patient reports no problems, other than his whiskers rubbing against his upper lip    EXAM: Site appears well healed. Overall very good cosmetic result    IMPRESSION: Well healed post Mohs' micrographic surgery    PLAN:  Discussed anticipated course  Followup to Dr. Green in 4-6 months; prn to me

## 2017-04-07 ENCOUNTER — OFFICE VISIT (OUTPATIENT)
Dept: OPTOMETRY | Facility: CLINIC | Age: 82
End: 2017-04-07
Payer: MEDICARE

## 2017-04-07 DIAGNOSIS — H52.13 MYOPIA WITH ASTIGMATISM AND PRESBYOPIA, BILATERAL: ICD-10-CM

## 2017-04-07 DIAGNOSIS — H52.4 MYOPIA WITH ASTIGMATISM AND PRESBYOPIA, BILATERAL: ICD-10-CM

## 2017-04-07 DIAGNOSIS — H52.203 MYOPIA WITH ASTIGMATISM AND PRESBYOPIA, BILATERAL: ICD-10-CM

## 2017-04-07 DIAGNOSIS — H11.121 CONJUNCTIVAL CONCRETION OF RIGHT UPPER EYELID: Primary | ICD-10-CM

## 2017-04-07 PROCEDURE — 99999 PR PBB SHADOW E&M-EST. PATIENT-LVL III: CPT | Mod: PBBFAC,,, | Performed by: OPTOMETRIST

## 2017-04-07 PROCEDURE — 92012 INTRM OPH EXAM EST PATIENT: CPT | Mod: 25,S$GLB,, | Performed by: OPTOMETRIST

## 2017-04-07 PROCEDURE — 65205 REMOVE FOREIGN BODY FROM EYE: CPT | Mod: RT,S$GLB,, | Performed by: OPTOMETRIST

## 2017-04-07 NOTE — PROGRESS NOTES
"HPI     Blurred Vision    Additional comments: at both near & distance -- OD >> OS           Eye Pain    Additional comments: +FBS under RUL -- "feels like grain of sand under   lid"  // +ATS            Comments   Agree above  Needs new refraction  fbs under sup lid OD       Last edited by ANALILIA Hassan, OD on 4/7/2017 12:31 PM. (History)            Assessment /Plan     For exam results, see Encounter Report.    Conjunctival concretion of right upper eyelid    Myopia with astigmatism and presbyopia, bilateral      Removed concretion, topical fluress /lidocaine  Sterile swab and 25g needle, jewelers forceps  Tolerated well, no complication    Updated specs rx, gave copy fill prn      Will plan to f/u with Dr Natarajan per his note ~ 9/2017             "

## 2017-04-17 RX ORDER — ATORVASTATIN CALCIUM 40 MG/1
TABLET, FILM COATED ORAL
Qty: 90 TABLET | Refills: 5 | Status: SHIPPED | OUTPATIENT
Start: 2017-04-17 | End: 2018-04-06 | Stop reason: SDUPTHER

## 2017-04-25 ENCOUNTER — OFFICE VISIT (OUTPATIENT)
Dept: GASTROENTEROLOGY | Facility: CLINIC | Age: 82
End: 2017-04-25
Payer: MEDICARE

## 2017-04-25 VITALS
DIASTOLIC BLOOD PRESSURE: 60 MMHG | SYSTOLIC BLOOD PRESSURE: 120 MMHG | HEART RATE: 60 BPM | HEIGHT: 67 IN | WEIGHT: 146.63 LBS | BODY MASS INDEX: 23.01 KG/M2 | RESPIRATION RATE: 20 BRPM

## 2017-04-25 DIAGNOSIS — K59.09 CHRONIC CONSTIPATION: Primary | ICD-10-CM

## 2017-04-25 PROCEDURE — 1159F MED LIST DOCD IN RCRD: CPT | Mod: S$GLB,,, | Performed by: INTERNAL MEDICINE

## 2017-04-25 PROCEDURE — 99213 OFFICE O/P EST LOW 20 MIN: CPT | Mod: S$GLB,,, | Performed by: INTERNAL MEDICINE

## 2017-04-25 PROCEDURE — 99999 PR PBB SHADOW E&M-EST. PATIENT-LVL III: CPT | Mod: PBBFAC,,, | Performed by: INTERNAL MEDICINE

## 2017-04-25 PROCEDURE — 1126F AMNT PAIN NOTED NONE PRSNT: CPT | Mod: S$GLB,,, | Performed by: INTERNAL MEDICINE

## 2017-04-25 RX ORDER — LACTULOSE 10 G/15ML
20 SOLUTION ORAL DAILY
Qty: 900 ML | Refills: 5 | Status: SHIPPED | OUTPATIENT
Start: 2017-04-25 | End: 2018-05-24 | Stop reason: SDUPTHER

## 2017-04-25 NOTE — MR AVS SNAPSHOT
[facility]  [address]  [facility] [address]  Phone: [phone]                  Kashmir Parikh   2017 4:30 PM   Office Visit    Description:  Male : 10/18/1932   Provider:  Orlin Mcclure Jr., MD   Department:  [facility]           Reason for Visit     Constipation     Hemorrhoids     Fecal Impaction           Diagnoses this Visit        Comments    Chronic constipation    -  Primary            To Do List           Future Appointments        Provider Department Dept Phone    2017 8:35 AM LAB, [place] [facility] [phone]    2017 1:00 PM VASCULAR [facility] [phone]    2017 1:45 PM VASCULAR [facility] [phone]      Goals (5 Years of Data)     None       These Medications        Disp Refills Start End    lactulose (CHRONULAC) 20 gram/30 mL Soln 900 mL 5 2017     Take 30 mLs (20 g total) by mouth once daily. - Oral    Pharmacy: [facility] - [place], [place] - [address] [address] Ph #: [phone]         [facility]     [facility] Nurse Care Line -  Assistance  Unless otherwise directed by your provider, please contact [facility], our nurse care line that is available for  assistance.     Registered nurses in the [facility] provide: appointment scheduling, clinical advisement, health education, and other advisory services.  Call: [phone] (toll free)               Medications           Message regarding Medications     Verify the changes and/or additions to your medication regime listed below are the same as discussed with your clinician today.  If any of these changes or additions are incorrect, please notify your healthcare provider.        START taking these NEW medications        Refills    lactulose (CHRONULAC) 20 gram/30 mL Soln 5    Sig: Take 30 mLs (20 g total) by mouth once daily.    Class: Normal    Route: Oral            Verify that the below list of medications is an accurate representation of the medications you are currently taking.  If none reported, the list may be blank. If incorrect, please contact your healthcare provider. Carry this list with you in case of emergency.           Current Medications     acetaminophen (TYLENOL) 325 MG tablet Take 1 tablet (325 mg total) by mouth every 6 (six) hours as needed for Pain.    aspirin 81 MG Chew Take 81 mg by mouth once daily.    atorvastatin (LIPITOR) 40 MG tablet TAKE ONE TABLET BY MOUTH AT BEDTIME FOR CHOLESTEROL    benazepril (LOTENSIN) 5 MG tablet Take 1 tablet (5 mg total) by mouth once daily.    carvedilol (COREG) 3.125 MG tablet TAKE ONE TABLET BY MOUTH TWO TIMES A DAY AS DIRECTED    cephALEXin (KEFLEX) 500 MG capsule Take four by mouth one hour prior to surgery    clopidogrel (PLAVIX) 75 mg tablet Take 1 tablet (75 mg total) by mouth once daily.    colchicine 0.6 mg tablet TAKE ONE CAPSULE BY MOUTH ONCE DAILY FOR GOUT    docusate sodium (COLACE) 100 MG capsule Take 100 mg by mouth as needed for Constipation.    ketoconazole (NIZORAL) 2 % cream aaa face twice daily prn scaling and redness    ketoconazole (NIZORAL) 2 % shampoo Wash face/beard area with medicated shampoo 3x/week    multivitamin capsule Take 1 capsule by mouth once daily.    omega-3 fatty acids Cap Take 1 capsule by mouth 2 (two) times daily.     oxycodone-acetaminophen (PERCOCET) 7.5-325 mg per tablet TAKE TWO TABLETS BY MOUTH EVERY 4 HOURS AS NEEDED FOR PAIN    polyethylene glycol (GLYCOLAX) 17 gram/dose powder MIX 17-GRAMS (ONE CAPFUL) WITH 8-OUNCES WATER, JUICE OR CLEAR FLUID AND DRINK ONCE DAILY UP TO FOUR TIMES A DAY AS NEEDED TO PREVENT/RELIEVE    tamsulosin (FLOMAX) 0.4 mg Cp24 TAKE ONE CAPSULE BY MOUTH ONCE DAILY    zolpidem (AMBIEN) 5 MG Tab Take 1 tablet (5 mg total) by mouth nightly.    lactulose (CHRONULAC) 20 gram/30 mL Soln Take 30 mLs (20 g total) by mouth once daily.           Clinical  "Reference Information           Your Vitals Were     BP Pulse Resp Height Weight BMI    120/60 (BP Location: Right arm, Patient Position: Sitting, BP Method: Manual) 60 20 5' 7" (1.702 m) 66.5 kg (146 lb 9.7 oz) 22.96 kg/m2      Blood Pressure          Most Recent Value    BP  120/60      Allergies as of 4/25/2017     Toradol [Ketorolac]    Pork/porcine Containing Products    Shrimp      Immunizations Administered on Date of Encounter - 4/25/2017     None      Language Assistance Services     ATTENTION: Language assistance services are available, free of charge. Please call [phone].      ATENCIÓN: Si habla español, tiene a meehan disposición servicios gratuitos de asistencia lingüística. Llame al [phone].     CHÚ Ý: N?u b?n nói Ti?ng Vi?t, có các d?ch v? h? tr? ngôn ng? mi?n phí dành cho b?n. G?i s? [phone].         [facility] complies with applicable Federal civil rights laws and does not discriminate on the basis of race, color, national origin, age, disability, or sex.        "

## 2017-04-25 NOTE — PROGRESS NOTES
"Subjective:       Patient ID: Kashmir Parikh is a 84 y.o. male.    Chief Complaint: Constipation; Hemorrhoids; and Fecal Impaction    Mr. Parikh is here today, with his wife, to discuss his GI issues.   His main c/o is constipation.  Will go for many days without a BM.  Then when it occurs, the stool comes in "strands."   And along with this his hemorrhoids flare up.  He is using MoM prn (as it works better than Ducolax).  He had stopped using the glycolax a while back, as it had stopped "working."    Hasn't tried probiotics.  (And all of these things had been rec'd by his wife).    Denies any new meds recently.  Denies any abx in the recent past.   Last colonoscopy was 6/2015 (see below).          Colonoscopy    6/1/2015 10:12 AM  Indications:         Change in bowel habits, Constipation. Occasional Rectal bleeding  Impression:  - One 1 mm polyp at the hepatic flexure. Resected and retrieved.                       - Patent end-to-end colo-colonic anastomosis, at 15 cm proximal to the anus, characterized by healthy appearing mucosa.                       - Diverticulosis in the descending colon.                       - Diverticulosis at the hepatic flexure.                       - Internal hemorrhoids.                       - Enlarged prostate found on digital rectal exam.                       - The examination was otherwise normal.                       - The examined portion of the ileum was normal.  Recommendation:      - Discharge patient to home.                       - Await pathology results.                       - High fiber diet.                       - Use fiber, for example Citrucel, Fibercon, Konsyl or Metamucil.                       - Take a PROBIOTIC, such as a carton of GREEK YOGURT (Chobani or Oikos, or Activia or Dannon); or tablets                        of ALIGN or CULTURELLE or SHAWN-Q (all non-prescription), every day for a month.                       - Miralax 17 g (1 capful) in 8 oz " water PO daily.                       - Call the [facility] clinic in 2 weeks for reports (if you haven't heard from us sooner) [phone].                       - Repeat colonoscopy only PRN.                       - Continue present medications.                       - Anusol (pramoxine) HC suppository: Insert rectally as necessary.  Orlin Mcclure MD  6/1/2015      Constipation   Pertinent negatives include no abdominal pain, diarrhea, fever, nausea, rectal pain or vomiting.     Review of Systems   Constitutional: Negative for appetite change, fever and unexpected weight change.   HENT: Negative.  Negative for mouth sores, sore throat and trouble swallowing.    Eyes: Negative.    Respiratory: Negative.  Negative for cough and choking.    Cardiovascular: Negative.  Negative for chest pain and leg swelling.   Gastrointestinal: Positive for constipation. Negative for abdominal distention, abdominal pain, anal bleeding, blood in stool, diarrhea, nausea, rectal pain and vomiting.   Genitourinary: Negative.    Musculoskeletal: Negative.    Skin: Negative.  Negative for color change and rash.   Neurological: Negative.    Hematological: Negative for adenopathy.   Psychiatric/Behavioral: Negative.        Objective:      Physical Exam   Constitutional: He is oriented to person, place, and time. He appears well-developed and well-nourished.   HENT:   Mouth/Throat: Oropharynx is clear and moist. No oropharyngeal exudate.   Eyes: No scleral icterus.   Neck: No tracheal deviation present. No thyromegaly present.   Cardiovascular: Normal rate, regular rhythm and normal heart sounds.    Pulmonary/Chest: Effort normal and breath sounds normal.   Abdominal: Soft. Bowel sounds are normal. He exhibits no distension and no mass. There is no tenderness. There is no guarding.   Lymphadenopathy:     He has no cervical adenopathy.   Neurological: He is alert and oriented to person, place, and time.   Skin: Skin is warm and dry. No rash  noted.   Psychiatric: He has a normal mood and affect.   Nursing note and vitals reviewed.      Assessment:         Chronic constipation    Other orders  -     lactulose (CHRONULAC) 20 gram/30 mL Soln; Take 30 mLs (20 g total) by mouth once daily.  Dispense: 900 mL; Refill: 5      Plan:        1. Since the glycolax had stopped working, will give lactulose a trial.    2. Cont high fiber diet.   3. And get a probiotic!!

## 2017-05-04 RX ORDER — ZOLPIDEM TARTRATE 5 MG/1
TABLET ORAL
Qty: 90 TABLET | Refills: 0 | Status: SHIPPED | OUTPATIENT
Start: 2017-05-04 | End: 2018-06-08

## 2017-05-15 RX ORDER — OXYCODONE AND ACETAMINOPHEN 7.5; 325 MG/1; MG/1
TABLET ORAL
Qty: 90 TABLET | Refills: 0 | Status: SHIPPED | OUTPATIENT
Start: 2017-05-15 | End: 2017-07-05 | Stop reason: SDUPTHER

## 2017-05-24 ENCOUNTER — CLINICAL SUPPORT (OUTPATIENT)
Dept: CARDIOLOGY | Facility: CLINIC | Age: 82
End: 2017-05-24
Payer: MEDICARE

## 2017-05-24 ENCOUNTER — LAB VISIT (OUTPATIENT)
Dept: LAB | Facility: HOSPITAL | Age: 82
End: 2017-05-24
Attending: INTERNAL MEDICINE
Payer: MEDICARE

## 2017-05-24 DIAGNOSIS — I77.9 BILATERAL CAROTID ARTERY DISEASE: ICD-10-CM

## 2017-05-24 DIAGNOSIS — I25.83 CORONARY ARTERY DISEASE DUE TO LIPID RICH PLAQUE: ICD-10-CM

## 2017-05-24 DIAGNOSIS — I10 ESSENTIAL HYPERTENSION: ICD-10-CM

## 2017-05-24 DIAGNOSIS — I25.10 CORONARY ARTERY DISEASE DUE TO LIPID RICH PLAQUE: ICD-10-CM

## 2017-05-24 DIAGNOSIS — Z95.2 S/P AVR (AORTIC VALVE REPLACEMENT): ICD-10-CM

## 2017-05-24 DIAGNOSIS — E78.5 DYSLIPIDEMIA: ICD-10-CM

## 2017-05-24 DIAGNOSIS — Z95.1 S/P CABG X 1: ICD-10-CM

## 2017-05-24 LAB
ALBUMIN SERPL BCP-MCNC: 3.6 G/DL
ALP SERPL-CCNC: 101 U/L
ALT SERPL W/O P-5'-P-CCNC: 23 U/L
ANION GAP SERPL CALC-SCNC: 8 MMOL/L
AST SERPL-CCNC: 19 U/L
BASOPHILS # BLD AUTO: 0.01 K/UL
BASOPHILS NFR BLD: 0.2 %
BILIRUB SERPL-MCNC: 0.5 MG/DL
BUN SERPL-MCNC: 19 MG/DL
CALCIUM SERPL-MCNC: 9.2 MG/DL
CHLORIDE SERPL-SCNC: 107 MMOL/L
CHOLEST/HDLC SERPL: 3.4 {RATIO}
CO2 SERPL-SCNC: 27 MMOL/L
CREAT SERPL-MCNC: 1 MG/DL
DIFFERENTIAL METHOD: ABNORMAL
EOSINOPHIL # BLD AUTO: 0.1 K/UL
EOSINOPHIL NFR BLD: 2.3 %
ERYTHROCYTE [DISTWIDTH] IN BLOOD BY AUTOMATED COUNT: 13.4 %
EST. GFR  (AFRICAN AMERICAN): >60 ML/MIN/1.73 M^2
EST. GFR  (NON AFRICAN AMERICAN): >60 ML/MIN/1.73 M^2
GLUCOSE SERPL-MCNC: 98 MG/DL
HCT VFR BLD AUTO: 38.3 %
HDL/CHOLESTEROL RATIO: 29.2 %
HDLC SERPL-MCNC: 120 MG/DL
HDLC SERPL-MCNC: 35 MG/DL
HGB BLD-MCNC: 12.2 G/DL
LDLC SERPL CALC-MCNC: 61.4 MG/DL
LYMPHOCYTES # BLD AUTO: 1.8 K/UL
LYMPHOCYTES NFR BLD: 39.9 %
MCH RBC QN AUTO: 30.7 PG
MCHC RBC AUTO-ENTMCNC: 31.9 %
MCV RBC AUTO: 97 FL
MONOCYTES # BLD AUTO: 0.6 K/UL
MONOCYTES NFR BLD: 14 %
NEUTROPHILS # BLD AUTO: 1.9 K/UL
NEUTROPHILS NFR BLD: 43.6 %
NONHDLC SERPL-MCNC: 85 MG/DL
PLATELET # BLD AUTO: 219 K/UL
PMV BLD AUTO: 10.8 FL
POTASSIUM SERPL-SCNC: 3.9 MMOL/L
PROT SERPL-MCNC: 6.4 G/DL
RBC # BLD AUTO: 3.97 M/UL
SODIUM SERPL-SCNC: 142 MMOL/L
TRIGL SERPL-MCNC: 118 MG/DL
TSH SERPL DL<=0.005 MIU/L-ACNC: 3.36 UIU/ML
WBC # BLD AUTO: 4.44 K/UL

## 2017-05-24 PROCEDURE — 93306 TTE W/DOPPLER COMPLETE: CPT | Mod: S$GLB,,, | Performed by: INTERNAL MEDICINE

## 2017-05-24 PROCEDURE — 93880 EXTRACRANIAL BILAT STUDY: CPT | Mod: S$GLB,,, | Performed by: INTERNAL MEDICINE

## 2017-05-29 LAB
ESTIMATED PA SYSTOLIC PRESSURE: 27.8
INTERNAL CAROTID STENOSIS: ABNORMAL
MITRAL VALVE MOBILITY: ABNORMAL
MITRAL VALVE REGURGITATION: ABNORMAL
RETIRED EF AND QEF - SEE NOTES: 35 (ref 55–65)
TRICUSPID VALVE REGURGITATION: ABNORMAL

## 2017-05-30 RX ORDER — CARVEDILOL 3.12 MG/1
TABLET ORAL
Qty: 180 TABLET | Refills: 4 | Status: SHIPPED | OUTPATIENT
Start: 2017-05-30 | End: 2018-04-06 | Stop reason: SDUPTHER

## 2017-06-19 RX ORDER — BENAZEPRIL HYDROCHLORIDE 5 MG/1
TABLET ORAL
Qty: 30 TABLET | Refills: 3 | Status: SHIPPED | OUTPATIENT
Start: 2017-06-19 | End: 2017-09-21

## 2017-06-20 RX ORDER — TAMSULOSIN HYDROCHLORIDE 0.4 MG/1
CAPSULE ORAL
Qty: 90 CAPSULE | Refills: 3 | Status: SHIPPED | OUTPATIENT
Start: 2017-06-20 | End: 2018-06-08 | Stop reason: SDUPTHER

## 2017-06-26 RX ORDER — CLOPIDOGREL BISULFATE 75 MG/1
TABLET ORAL
Qty: 30 TABLET | Refills: 3 | Status: SHIPPED | OUTPATIENT
Start: 2017-06-26 | End: 2017-11-27 | Stop reason: SDUPTHER

## 2017-07-05 RX ORDER — OXYCODONE AND ACETAMINOPHEN 7.5; 325 MG/1; MG/1
TABLET ORAL
Qty: 90 TABLET | Refills: 0 | Status: SHIPPED | OUTPATIENT
Start: 2017-07-05 | End: 2017-08-14 | Stop reason: SDUPTHER

## 2017-07-17 ENCOUNTER — TELEPHONE (OUTPATIENT)
Dept: CARDIOLOGY | Facility: CLINIC | Age: 82
End: 2017-07-17

## 2017-07-17 NOTE — TELEPHONE ENCOUNTER
----- Message from Bianka Eric sent at 7/17/2017  3:27 PM CDT -----  Contact: Crystal/wife  Crystal called and stated she has an appt on 8/23/17 @ 2:20 pm and she wants to know if the patient can be worked in around that date and time for him to review test results with the doctor. She can be contacted at [phone].    Thanks,  Bianka

## 2017-08-15 RX ORDER — OXYCODONE AND ACETAMINOPHEN 7.5; 325 MG/1; MG/1
TABLET ORAL
Qty: 90 TABLET | Refills: 0 | Status: SHIPPED | OUTPATIENT
Start: 2017-08-15 | End: 2017-09-12 | Stop reason: SDUPTHER

## 2017-09-12 RX ORDER — OXYCODONE AND ACETAMINOPHEN 7.5; 325 MG/1; MG/1
TABLET ORAL
Qty: 90 TABLET | Refills: 0 | Status: SHIPPED | OUTPATIENT
Start: 2017-09-12 | End: 2017-10-11 | Stop reason: SDUPTHER

## 2017-09-14 ENCOUNTER — OFFICE VISIT (OUTPATIENT)
Dept: GASTROENTEROLOGY | Facility: CLINIC | Age: 82
End: 2017-09-14
Payer: MEDICARE

## 2017-09-14 VITALS
SYSTOLIC BLOOD PRESSURE: 112 MMHG | BODY MASS INDEX: 22.56 KG/M2 | WEIGHT: 143.75 LBS | HEIGHT: 67 IN | HEART RATE: 60 BPM | DIASTOLIC BLOOD PRESSURE: 60 MMHG

## 2017-09-14 DIAGNOSIS — K59.09 CHRONIC CONSTIPATION: Primary | ICD-10-CM

## 2017-09-14 PROCEDURE — 3078F DIAST BP <80 MM HG: CPT | Mod: S$GLB,,, | Performed by: INTERNAL MEDICINE

## 2017-09-14 PROCEDURE — 1159F MED LIST DOCD IN RCRD: CPT | Mod: S$GLB,,, | Performed by: INTERNAL MEDICINE

## 2017-09-14 PROCEDURE — 99212 OFFICE O/P EST SF 10 MIN: CPT | Mod: S$GLB,,, | Performed by: INTERNAL MEDICINE

## 2017-09-14 PROCEDURE — 3074F SYST BP LT 130 MM HG: CPT | Mod: S$GLB,,, | Performed by: INTERNAL MEDICINE

## 2017-09-14 PROCEDURE — 99999 PR PBB SHADOW E&M-EST. PATIENT-LVL III: CPT | Mod: PBBFAC,,, | Performed by: INTERNAL MEDICINE

## 2017-09-14 PROCEDURE — 3008F BODY MASS INDEX DOCD: CPT | Mod: S$GLB,,, | Performed by: INTERNAL MEDICINE

## 2017-09-14 PROCEDURE — 1126F AMNT PAIN NOTED NONE PRSNT: CPT | Mod: S$GLB,,, | Performed by: INTERNAL MEDICINE

## 2017-09-14 NOTE — PROGRESS NOTES
Subjective:       Patient ID: Kashmir Parikh is a 84 y.o. male.    Chief Complaint: Other (f/u doing well)    Mr. Parikh returns today, with his wife, for f/u of his constipation.  See last OV note.  Trial of lactulose (instead of glycolax).  And he admits that it does seem to work.  But he's resorted to taking it only PRN again.  Also, same for the Metamucil: not taking it every day (despite his wife reminding him).  Otherwise, no new complaints.      Review of Systems   Constitutional: Negative for appetite change, fever and unexpected weight change.   HENT: Negative.  Negative for mouth sores, sore throat and trouble swallowing.    Eyes: Negative.    Respiratory: Negative.  Negative for cough and choking.    Cardiovascular: Negative.  Negative for chest pain and leg swelling.   Gastrointestinal: Positive for constipation. Negative for abdominal distention, abdominal pain, anal bleeding, blood in stool, diarrhea, nausea and vomiting.   Genitourinary: Negative.    Musculoskeletal: Negative.    Skin: Negative.  Negative for color change and rash.   Neurological: Negative.    Hematological: Negative for adenopathy.   Psychiatric/Behavioral: Negative.        Objective:      Physical Exam   Constitutional: He is oriented to person, place, and time. He appears well-developed and well-nourished.   HENT:   Mouth/Throat: Oropharynx is clear and moist. No oropharyngeal exudate.   Eyes: No scleral icterus.   Neck: No tracheal deviation present. No thyromegaly present.   Cardiovascular: Normal rate and regular rhythm.    Murmur (Soft aortic m.) heard.  Pulmonary/Chest: Effort normal and breath sounds normal.   Abdominal: Soft. Bowel sounds are normal. He exhibits no distension and no mass. There is no tenderness. There is no guarding.   Flat, with normal bowel sounds.  Soft.  Nontender.  No masses, no organomegaly.   Lymphadenopathy:     He has no cervical adenopathy.   Neurological: He is alert and oriented to person,  place, and time.   Skin: Skin is warm and dry. No rash noted.   Psychiatric: He has a normal mood and affect.   Nursing note and vitals reviewed.      Assessment:         Chronic constipation      Plan:        1. Take a dose of the Lactulose every day, either 1 tbs a day, or up to 2 a day.   2. Take the Metamucil.   3. RTC PRN.

## 2017-09-21 ENCOUNTER — OFFICE VISIT (OUTPATIENT)
Dept: CARDIOLOGY | Facility: CLINIC | Age: 82
End: 2017-09-21
Payer: MEDICARE

## 2017-09-21 VITALS
BODY MASS INDEX: 22.59 KG/M2 | HEART RATE: 73 BPM | DIASTOLIC BLOOD PRESSURE: 52 MMHG | RESPIRATION RATE: 18 BRPM | SYSTOLIC BLOOD PRESSURE: 107 MMHG | WEIGHT: 143.94 LBS | HEIGHT: 67 IN

## 2017-09-21 DIAGNOSIS — I10 ESSENTIAL HYPERTENSION: ICD-10-CM

## 2017-09-21 DIAGNOSIS — Z95.1 S/P CABG X 1: Primary | ICD-10-CM

## 2017-09-21 DIAGNOSIS — I25.10 CORONARY ARTERY DISEASE INVOLVING NATIVE CORONARY ARTERY OF NATIVE HEART WITHOUT ANGINA PECTORIS: ICD-10-CM

## 2017-09-21 DIAGNOSIS — Z95.2 S/P AVR (AORTIC VALVE REPLACEMENT): ICD-10-CM

## 2017-09-21 DIAGNOSIS — E78.5 DYSLIPIDEMIA: ICD-10-CM

## 2017-09-21 DIAGNOSIS — I77.9 BILATERAL CAROTID ARTERY DISEASE: ICD-10-CM

## 2017-09-21 PROCEDURE — 99499 UNLISTED E&M SERVICE: CPT | Mod: S$GLB,,, | Performed by: INTERNAL MEDICINE

## 2017-09-21 PROCEDURE — 99214 OFFICE O/P EST MOD 30 MIN: CPT | Mod: S$GLB,,, | Performed by: INTERNAL MEDICINE

## 2017-09-21 PROCEDURE — 3074F SYST BP LT 130 MM HG: CPT | Mod: S$GLB,,, | Performed by: INTERNAL MEDICINE

## 2017-09-21 PROCEDURE — 99999 PR PBB SHADOW E&M-EST. PATIENT-LVL III: CPT | Mod: PBBFAC,,, | Performed by: INTERNAL MEDICINE

## 2017-09-21 PROCEDURE — 1126F AMNT PAIN NOTED NONE PRSNT: CPT | Mod: S$GLB,,, | Performed by: INTERNAL MEDICINE

## 2017-09-21 PROCEDURE — 3078F DIAST BP <80 MM HG: CPT | Mod: S$GLB,,, | Performed by: INTERNAL MEDICINE

## 2017-09-21 PROCEDURE — 3008F BODY MASS INDEX DOCD: CPT | Mod: S$GLB,,, | Performed by: INTERNAL MEDICINE

## 2017-09-21 PROCEDURE — 1159F MED LIST DOCD IN RCRD: CPT | Mod: S$GLB,,, | Performed by: INTERNAL MEDICINE

## 2017-09-21 RX ORDER — BENAZEPRIL HYDROCHLORIDE 5 MG/1
2.5 TABLET ORAL NIGHTLY
Qty: 90 TABLET | Refills: 5 | Status: SHIPPED | OUTPATIENT
Start: 2017-09-21 | End: 2018-03-15 | Stop reason: SDUPTHER

## 2017-09-21 RX ORDER — LACTULOSE 10 G/15ML
SOLUTION ORAL; RECTAL
Refills: 5 | COMMUNITY
Start: 2017-06-19 | End: 2019-01-02

## 2017-09-21 NOTE — PROGRESS NOTES
Subjective:    Patient ID:  Kashmir Parikh is a 84 y.o. male who presents for follow-up of Follow-up (6 mo)      HPI  Here for f/u of AVR/CABG (2012). Does all ADL's no angina. No SOB/edema.      Review of Systems   Constitution: Negative for malaise/fatigue.   Eyes: Negative for blurred vision.   Cardiovascular: Negative for chest pain, claudication, cyanosis, dyspnea on exertion, irregular heartbeat, leg swelling, near-syncope, orthopnea, palpitations, paroxysmal nocturnal dyspnea and syncope.   Respiratory: Negative for cough and shortness of breath.    Hematologic/Lymphatic: Does not bruise/bleed easily.   Musculoskeletal: Negative for back pain, falls, joint pain, muscle cramps, muscle weakness and myalgias.   Gastrointestinal: Negative for abdominal pain, change in bowel habit, nausea and vomiting.   Genitourinary: Negative for urgency.   Neurological: Negative for dizziness, focal weakness and light-headedness.        Objective:    Physical Exam   Cardiovascular:   Murmur heard.   Harsh midsystolic murmur is present with a grade of 2/6  at the upper right sternal border radiating to the neck  Well healed midline sternal incision.               ..    Chemistry        Component Value Date/Time     05/24/2017 0905    K 3.9 05/24/2017 0905     05/24/2017 0905    CO2 27 05/24/2017 0905    BUN 19 05/24/2017 0905    CREATININE 1.0 05/24/2017 0905    GLU 98 05/24/2017 0905        Component Value Date/Time    CALCIUM 9.2 05/24/2017 0905    ALKPHOS 101 05/24/2017 0905    AST 19 05/24/2017 0905    AST 28 03/08/2016 2105    ALT 23 05/24/2017 0905    BILITOT 0.5 05/24/2017 0905    ESTGFRAFRICA >60.0 05/24/2017 0905    EGFRNONAA >60.0 05/24/2017 0905            ..  Lab Results   Component Value Date    CHOL 120 05/24/2017    CHOL 154 08/18/2015    CHOL 138 09/02/2014     Lab Results   Component Value Date    HDL 35 (L) 05/24/2017    HDL 37 (L) 08/18/2015    HDL 35 (L) 09/02/2014     Lab Results   Component  Value Date    LDLCALC 61.4 (L) 05/24/2017    LDLCALC 89.0 08/18/2015    LDLCALC 68.2 09/02/2014     Lab Results   Component Value Date    TRIG 118 05/24/2017    TRIG 140 08/18/2015    TRIG 174 (H) 09/02/2014     Lab Results   Component Value Date    CHOLHDL 29.2 05/24/2017    CHOLHDL 24.0 08/18/2015    CHOLHDL 25.4 09/02/2014     ..  Lab Results   Component Value Date    WBC 4.44 05/24/2017    HGB 12.2 (L) 05/24/2017    HCT 38.3 (L) 05/24/2017    MCV 97 05/24/2017     05/24/2017       Test(s) Reviewed  I have reviewed the following in detail:  [] Stress test   [] Angiography   [x] Echocardiogram   [x] Labs   [] Other:       Assessment:         ICD-10-CM ICD-9-CM   1. S/P CABG x 1 Z95.1 V45.81   2. Coronary artery disease involving native coronary artery of native heart without angina pectoris I25.10 414.01   3. S/P AVR (aortic valve replacement) Z95.2 V43.3   4. Dyslipidemia E78.5 272.4   5. Essential hypertension I10 401.9   6. Bilateral carotid artery disease I77.9 447.9     Problem List Items Addressed This Visit     Bilateral carotid artery disease    CAD (coronary artery disease)    Overview     4/2016 failed  of LAD  12/12 LAD-35%, OM 75%, small RCA         Dyslipidemia    Essential hypertension    S/P AVR (aortic valve replacement)    Overview     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 mosaic         S/P CABG x 1 - Primary    Overview     12/12 VG-OM           Other Visit Diagnoses    None.          Plan:           Return to clinic 9 months   Low level/low impact aerobic exercise 5x's/wk. Heart healthy diet and risk factor modification.    See labs and med orders.  Ef decreased again but no sx's follow for now.   Decrease ACE due to hypotension

## 2017-09-21 NOTE — PROGRESS NOTES
Patient, Kashmir Parikh (MRN #[id number]), presented with a recent Ejection Fraction less than 45% consistent with the definition of cardiomyopathy (ICD10- I42.8).    EF   Date Value Ref Range Status   05/24/2017 35 (A) 55 - 65      The patient's cardiomyopathy was monitored, evaluated, addressed and/or treated. This addendum to the medical record is made on 09/21/2017.

## 2017-10-12 RX ORDER — OXYCODONE AND ACETAMINOPHEN 7.5; 325 MG/1; MG/1
TABLET ORAL
Qty: 90 TABLET | Refills: 0 | Status: SHIPPED | OUTPATIENT
Start: 2017-10-12 | End: 2017-11-13 | Stop reason: SDUPTHER

## 2017-10-20 ENCOUNTER — OFFICE VISIT (OUTPATIENT)
Dept: FAMILY MEDICINE | Facility: CLINIC | Age: 82
End: 2017-10-20
Payer: MEDICARE

## 2017-10-20 VITALS
DIASTOLIC BLOOD PRESSURE: 68 MMHG | WEIGHT: 143.5 LBS | HEART RATE: 56 BPM | SYSTOLIC BLOOD PRESSURE: 127 MMHG | BODY MASS INDEX: 22.52 KG/M2 | HEIGHT: 67 IN

## 2017-10-20 DIAGNOSIS — I25.2 HISTORY OF MYOCARDIAL INFARCTION: ICD-10-CM

## 2017-10-20 DIAGNOSIS — Z95.1 S/P CABG X 1: ICD-10-CM

## 2017-10-20 DIAGNOSIS — N40.0 BENIGN NON-NODULAR PROSTATIC HYPERPLASIA WITHOUT LOWER URINARY TRACT SYMPTOMS: ICD-10-CM

## 2017-10-20 DIAGNOSIS — G47.00 INSOMNIA, UNSPECIFIED TYPE: ICD-10-CM

## 2017-10-20 DIAGNOSIS — I42.9 CARDIOMYOPATHY, UNSPECIFIED TYPE: ICD-10-CM

## 2017-10-20 DIAGNOSIS — Z02.89 PAIN MEDICATION AGREEMENT: ICD-10-CM

## 2017-10-20 DIAGNOSIS — I10 ESSENTIAL HYPERTENSION: ICD-10-CM

## 2017-10-20 DIAGNOSIS — I25.10 CORONARY ARTERY DISEASE INVOLVING NATIVE CORONARY ARTERY OF NATIVE HEART WITHOUT ANGINA PECTORIS: ICD-10-CM

## 2017-10-20 DIAGNOSIS — H33.003 RETINAL DETACHMENT OF BOTH EYES WITH RETINAL BREAK: ICD-10-CM

## 2017-10-20 DIAGNOSIS — I77.9 BILATERAL CAROTID ARTERY DISEASE: ICD-10-CM

## 2017-10-20 DIAGNOSIS — H91.93 BILATERAL HEARING LOSS, UNSPECIFIED HEARING LOSS TYPE: ICD-10-CM

## 2017-10-20 DIAGNOSIS — Z00.00 ENCOUNTER FOR PREVENTIVE HEALTH EXAMINATION: Primary | ICD-10-CM

## 2017-10-20 DIAGNOSIS — H25.13 NUCLEAR SCLEROSIS OF BOTH EYES: ICD-10-CM

## 2017-10-20 DIAGNOSIS — M10.9 GOUT, ARTHRITIS: ICD-10-CM

## 2017-10-20 DIAGNOSIS — E78.5 DYSLIPIDEMIA: ICD-10-CM

## 2017-10-20 DIAGNOSIS — I50.22 CHRONIC SYSTOLIC CONGESTIVE HEART FAILURE: ICD-10-CM

## 2017-10-20 DIAGNOSIS — H43.11 VITREOUS HEMORRHAGE, RIGHT EYE: ICD-10-CM

## 2017-10-20 DIAGNOSIS — I70.0 AORTIC ATHEROSCLEROSIS: ICD-10-CM

## 2017-10-20 DIAGNOSIS — J84.10 PULMONARY FIBROSIS: ICD-10-CM

## 2017-10-20 DIAGNOSIS — M51.36 DDD (DEGENERATIVE DISC DISEASE), LUMBAR: ICD-10-CM

## 2017-10-20 DIAGNOSIS — H35.3222 EXUDATIVE AGE-RELATED MACULAR DEGENERATION OF LEFT EYE WITH INACTIVE CHOROIDAL NEOVASCULARIZATION: ICD-10-CM

## 2017-10-20 DIAGNOSIS — Z95.2 S/P AVR (AORTIC VALVE REPLACEMENT): ICD-10-CM

## 2017-10-20 PROCEDURE — G0439 PPPS, SUBSEQ VISIT: HCPCS | Mod: S$GLB,,, | Performed by: NURSE PRACTITIONER

## 2017-10-20 PROCEDURE — 99999 PR PBB SHADOW E&M-EST. PATIENT-LVL V: CPT | Mod: PBBFAC,,, | Performed by: NURSE PRACTITIONER

## 2017-10-20 PROCEDURE — 99499 UNLISTED E&M SERVICE: CPT | Mod: S$GLB,,, | Performed by: NURSE PRACTITIONER

## 2017-10-20 NOTE — PATIENT INSTRUCTIONS
Counseling and Referral of Other Preventative  (Italic type indicates deductible and co-insurance are waived)    Patient Name: Kashmir Parikh  Today's Date: 10/20/2017      SERVICE LIMITATIONS RECOMMENDATION    Vaccines    · Pneumococcal (once after 65)    · Influenza (annually)    · Hepatitis B (if medium/high risk)    · Prevnar 13      Hepatitis B medium/high risk factors:       - End-stage renal disease       - Hemophiliacs who received Factor VII or         IX concentrates       - Clients of institutions for the mentally             retarded       - Persons who live in the same house as          a HepB carrier       - Homosexual men       - Illicit injectable drug abusers     Pneumococcal: Scheduled - see appointments     Influenza: Scheduled - see appointments     Hepatitis B: N/A     Prevnar 13: Done, no repeat necessary    Prostate cancer screening (annually to age 75)     Prostate specific antigen (PSA) Shared decision making with Provider. Sometimes a co-pay may be required if the patient decides to have this test. The USPSTF no longer recommends prostate cancer screening routinely in medicine: not to follow    Colorectal cancer screening (to age 75)    · Fecal occult blood test (annual)  · Flexible sigmoidoscopy (5y)  · Screening colonoscopy (10y)  · Barium enema   N/A    Diabetes self-management training (no USPSTF recommendations)  Requires referral by treating physician for patient with diabetes or renal disease. 10 hours of initial DSMT sessions of no less than 30 minutes each in a continuous 12-month period. 2 hours of follow-up DSMT in subsequent years.  N/A    Glaucoma screening (no USPSTF recommendation)  Diabetes mellitus, family history   , age 50 or over    American, age 65 or over  Recommend follow up with eye care professional regularly    Medical nutrition therapy for diabetes or renal disease (no recommended schedule)  Requires referral by treating physician for  patient with diabetes or renal disease or kidney transplant within the past 3 years.  Can be provided in same year as diabetes self-management training (DSMT), and CMS recommends medical nutrition therapy take place after DSMT. Up to 3 hours for initial year and 2 hours in subsequent years.  N/A    Cardiovascular screening blood tests (every 5 years)  · Fasting lipid panel  Order as a panel if possible  Done this year, repeat every year    Diabetes screening tests (at least every 3 years, Medicare covers annually or at 6-month intervals for prediabetic patients)  · Fasting blood sugar (FBS) or glucose tolerance test (GTT)  Patient must be diagnosed with one of the following:       - Hypertension       - Dyslipidemia       - Obesity (BMI 30kg/m2)       - Previous elevated impaired FBS or GTT       ... or any two of the following:       - Overweight (BMI 25 but <30)       - Family history of diabetes       - Age 65 or older       - History of gestational diabetes or birth of baby weighing more than 9 pounds  N/A    Abdominal aortic aneurysm screening (once)  · Sonogram   Limited to patients who meet one of the following criteria:       - Men who are 65-75 years old and have smoked more than 100 cigarette in their lifetime       - Anyone with a family history of abdominal aortic aneurysm       - Anyone recommended for screening by the USPSTF  N/A    HIV screening (annually for increased risk patients)  · HIV-1 and HIV-2 by EIA, or JENNIFER, rapid antibody test or oral mucosa transudate  Patients must be at increased risk for HIV infection per USPSTF guidelines or pregnant. Tests covered annually for patient at increased risk or as requested by the patient. Pregnant patients may receive up to 3 tests during pregnancy.  Risks discussed, screening is not recommended    Smoking cessation counseling (up to 8 sessions per year)  Patients must be asymptomatic of tobacco-related conditions to receive as a preventative service.   Non-smoker    Subsequent annual wellness visit  At least 12 months since last AWV  Return in one year     The following information is provided to all patients.  This information is to help you find resources for any of the problems found today that may be affecting your health:                Living healthy guide: www.[facility].[place].[facility]      Understanding Diabetes: www.diabetes.org      Eating healthy: www.cdc.gov/healthyweight      CDC home safety checklist: www.cdc.gov/steadi/patient.html      Agency on Aging: www.goea.[place].[facility]      Alcoholics anonymous (AA): www.aa.org      Physical Activity: www.sheila.nih.gov/rl9ffbm      Tobacco use: www.quitwithusla.org

## 2017-10-30 NOTE — PROGRESS NOTES
"Kashmir Parikh presented for a  Medicare AWV and comprehensive Health Risk Assessment today. The following components were reviewed and updated:    · Medical history  · Family History  · Social history  · Allergies and Current Medications  · Health Risk Assessment  · Health Maintenance  · Care Team     ** See Completed Assessments for Annual Wellness Visit within the encounter summary.**       The following assessments were completed:  · Living Situation  · CAGE  · Depression Screening  · Timed Get Up and Go  · Whisper Test  · Cognitive Function Screening          · Nutrition Screening  · ADL Screening  · PAQ Screening    Vitals:    10/20/17 1318   BP: 127/68   Pulse: (!) 56   Weight: 65.1 kg (143 lb 8.3 oz)   Height: 5' 7" (1.702 m)     Body mass index is 22.48 kg/m².  Physical Exam   Constitutional: He is oriented to person, place, and time. He appears well-nourished. He is active and cooperative.   HENT:   Right Ear: Decreased hearing is noted.   Left Ear: Decreased hearing is noted.   Eyes: No scleral icterus.   Cardiovascular: Normal rate and regular rhythm.    Murmur heard.  Pulmonary/Chest: Effort normal and breath sounds normal. He has no wheezes. He has no rales.   Neurological: He is alert and oriented to person, place, and time. No cranial nerve deficit.   Skin: Skin is warm and dry. No rash noted.   Vitals reviewed.        Diagnoses and health risks identified today and associated recommendations/orders:    1. Encounter for preventive health examination  Reviewed health maintenance and provided recommendations    Written rx for flu and ppsv23 provided  Recommend f/ with pcp (annual overdue)    2. Pulmonary fibrosis  Stable.   asymptomatic  Followed by Johny Ortiz MD .   CTA chest 3/8/16    3. Bilateral carotid artery disease  Continue to monitor with carotid US  Taking statin  Followed by Mandy.   Carotid US 7/30/12    4. Aortic atherosclerosis  Taking statin  Followed by mandy.   CTA chest " 3/8/16    5. Cardiomyopathy, unspecified type  Continue to monitor with echo  EF 30  Followed by matthew.       6. Coronary artery disease involving native coronary artery of native heart without angina pectoris  Stable.   No CP  Followed by Matthew.       7. Dyslipidemia  Continue to monitor   Taking statin  Followed by Matthew.       8. Essential hypertension  Stable.   Controlled on current medications.  Followed by Matthew.       9. Vitreous hemorrhage, right eye  Follow ophthalmology recs  Followed by Mo.       10. History of myocardial infarction  Stable.     Followed by Matthew.     11. Chronic systolic congestive heart failure  Continue to monitor   Controlled on current medications.  Followed by Johny Ortiz MD .     12. S/P AVR (aortic valve replacement)  Stable.     Followed by Matthew.       13. S/P CABG x 1  Stable.     Followed by Matthew.       14. Benign non-nodular prostatic hyperplasiawithout lower urinary tract symptoms  Stable.     Followed by Johny Ortiz MD .       15. Gout, arthritis  Stable.     Followed by Johny Ortiz MD .       16. Insomnia, unspecified type  Stable.   Controlled on current medications.  Followed by Johny Ortiz MD .       17. Bilateral hearing loss, unspecified hearing loss type  Unchanged    Followed by Johny Ortiz MD .       18. Retinal detachment of both eyes with retinal break  Stable.     Followed by Mo.       19. Exudative age-related macular degeneration of left eye with inactive choroidal neovascularization  Stable.     Followed by Mo.       20. DDD (degenerative disc disease), lumbar  Stable.   Controlled on current medications.  Followed by Johny Ortiz MD .       21. Nuclear sclerosis of both eyes  Stable.     Followed by Mo.       22. Pain medication agreement  Stable.     Followed by Johny Ortiz MD .         Sonia Kashmir with a 5-10 year written screening schedule and personal prevention plan. Recommendations were developed  using the USPSTF age appropriate recommendations. Education, counseling, and referrals were provided as needed. After Visit Summary printed and given to patient which includes a list of additional screenings\tests needed.    Return in about 1 year (around 10/20/2018).    Estela Sanchez NP

## 2017-11-13 RX ORDER — OXYCODONE AND ACETAMINOPHEN 7.5; 325 MG/1; MG/1
TABLET ORAL
Qty: 90 TABLET | Refills: 0 | Status: SHIPPED | OUTPATIENT
Start: 2017-11-13 | End: 2017-12-13 | Stop reason: SDUPTHER

## 2017-11-28 RX ORDER — CLOPIDOGREL BISULFATE 75 MG/1
TABLET ORAL
Qty: 30 TABLET | Refills: 3 | Status: SHIPPED | OUTPATIENT
Start: 2017-11-28 | End: 2018-04-06 | Stop reason: SDUPTHER

## 2017-12-13 RX ORDER — OXYCODONE AND ACETAMINOPHEN 7.5; 325 MG/1; MG/1
TABLET ORAL
Qty: 90 TABLET | Refills: 0 | Status: SHIPPED | OUTPATIENT
Start: 2017-12-13 | End: 2018-01-09 | Stop reason: SDUPTHER

## 2018-01-09 RX ORDER — OXYCODONE AND ACETAMINOPHEN 7.5; 325 MG/1; MG/1
TABLET ORAL
Qty: 90 TABLET | Refills: 0 | Status: SHIPPED | OUTPATIENT
Start: 2018-01-09 | End: 2018-02-12 | Stop reason: SDUPTHER

## 2018-02-06 ENCOUNTER — OFFICE VISIT (OUTPATIENT)
Dept: FAMILY MEDICINE | Facility: CLINIC | Age: 83
End: 2018-02-06
Payer: MEDICARE

## 2018-02-06 ENCOUNTER — HOSPITAL ENCOUNTER (OUTPATIENT)
Dept: RADIOLOGY | Facility: HOSPITAL | Age: 83
Discharge: HOME | End: 2018-02-06
Attending: FAMILY MEDICINE
Payer: MEDICARE

## 2018-02-06 VITALS
RESPIRATION RATE: 16 BRPM | SYSTOLIC BLOOD PRESSURE: 122 MMHG | HEIGHT: 67 IN | HEART RATE: 56 BPM | WEIGHT: 149.25 LBS | BODY MASS INDEX: 23.43 KG/M2 | DIASTOLIC BLOOD PRESSURE: 56 MMHG

## 2018-02-06 DIAGNOSIS — I50.9 CONGESTIVE HEART FAILURE, UNSPECIFIED CONGESTIVE HEART FAILURE CHRONICITY, UNSPECIFIED CONGESTIVE HEART FAILURE TYPE: ICD-10-CM

## 2018-02-06 DIAGNOSIS — J84.10 PULMONARY FIBROSIS: ICD-10-CM

## 2018-02-06 DIAGNOSIS — Z00.00 ROUTINE HEALTH MAINTENANCE: Primary | ICD-10-CM

## 2018-02-06 DIAGNOSIS — M51.36 DDD (DEGENERATIVE DISC DISEASE), LUMBAR: ICD-10-CM

## 2018-02-06 DIAGNOSIS — F11.20 OPIOID TYPE DEPENDENCE, CONTINUOUS: ICD-10-CM

## 2018-02-06 DIAGNOSIS — I70.0 AORTIC ATHEROSCLEROSIS: ICD-10-CM

## 2018-02-06 PROCEDURE — 90662 IIV NO PRSV INCREASED AG IM: CPT | Mod: S$GLB,,, | Performed by: FAMILY MEDICINE

## 2018-02-06 PROCEDURE — 72100 X-RAY EXAM L-S SPINE 2/3 VWS: CPT | Mod: TC,FY,PO

## 2018-02-06 PROCEDURE — 73521 X-RAY EXAM HIPS BI 2 VIEWS: CPT | Mod: TC,FY,PO

## 2018-02-06 PROCEDURE — 72100 X-RAY EXAM L-S SPINE 2/3 VWS: CPT | Mod: 26,,, | Performed by: RADIOLOGY

## 2018-02-06 PROCEDURE — 73521 X-RAY EXAM HIPS BI 2 VIEWS: CPT | Mod: 26,,, | Performed by: RADIOLOGY

## 2018-02-06 PROCEDURE — 99499 UNLISTED E&M SERVICE: CPT | Mod: S$GLB,,, | Performed by: FAMILY MEDICINE

## 2018-02-06 PROCEDURE — 99397 PER PM REEVAL EST PAT 65+ YR: CPT | Mod: S$GLB,,, | Performed by: FAMILY MEDICINE

## 2018-02-06 PROCEDURE — 99999 PR PBB SHADOW E&M-EST. PATIENT-LVL III: CPT | Mod: PBBFAC,,, | Performed by: FAMILY MEDICINE

## 2018-02-06 PROCEDURE — G0008 ADMIN INFLUENZA VIRUS VAC: HCPCS | Mod: S$GLB,,, | Performed by: FAMILY MEDICINE

## 2018-02-06 NOTE — PROGRESS NOTES
HPI  Kashmir Parikh is a 85 y.o. male with multiple medical diagnoses as listed in the medical history and problem list that presents for Annual Exam (hm due: tetanus, flu); Hip Pain (L hip; pt had a fall in December); and Leg Pain (L leg)  .      HPI  Here today for routine health maintenance.    PAST MEDICAL HISTORY:  Past Medical History:   Diagnosis Date    BPH (benign prostatic hypertrophy)     CAD (coronary artery disease) 1/8/2013 4/2016 failed  of LAD 12/12 LAD-35%, OM 75%, small RCA    Cataract     OU    CHF (congestive heart failure) 3/9/2016    Coronary artery disease     Diverticulitis     Gout, arthritis     HEARING LOSS     Heart murmur     asymptomatic    HTN (hypertension)     Pt states resolved    Hyperlipidemia     Myocardial infarction     Recurrent nephrolithiasis        PAST SURGICAL HISTORY:  Past Surgical History:   Procedure Laterality Date    AORTIC VALVE REPLACEMENT  12/05/2012    #21 Medtronic Mosaic tissue valve    CARDIAC SURGERY      CARPAL TUNNEL RELEASE  06/06    bilateral    CHOLECYSTECTOMY      COLONOSCOPY      CORONARY ANGIOPLASTY WITH STENT PLACEMENT      EYE SURGERY      retinal detachment x 2    HAND SURGERY      HERNIA REPAIR      umbilical    JOINT REPLACEMENT      bilateral knee    RETINAL DETACHMENT SURGERY Bilateral 1980    RD spontaneous OU    ROTATOR CUFF REPAIR      bilateral    SPINAL CORD STIMULATOR IMPLANT  11/15/2011    SUBTOTAL COLECTOMY         SOCIAL HISTORY:  Social History     Social History    Marital status:      Spouse name: N/A    Number of children: N/A    Years of education: N/A     Occupational History    Not on file.     Social History Main Topics    Smoking status: Former Smoker     Years: 10.00     Quit date: 8/7/1962    Smokeless tobacco: Never Used    Alcohol use Yes      Comment: rarely    Drug use: No    Sexual activity: No     Other Topics Concern    Not on file     Social History Narrative     No narrative on file       FAMILY HISTORY:  Family History   Problem Relation Age of Onset    Cancer Mother     Cancer Father        ALLERGIES AND MEDICATIONS: updated and reviewed.  Review of patient's allergies indicates:   Allergen Reactions    Toradol [ketorolac] Other (See Comments)     Renal failure    Pork/porcine containing products Other (See Comments)     gout    Shrimp Other (See Comments)     Gout     Current Outpatient Prescriptions   Medication Sig Dispense Refill    acetaminophen (TYLENOL) 325 MG tablet Take 1 tablet (325 mg total) by mouth every 6 (six) hours as needed for Pain.      aspirin 81 MG Chew Take 81 mg by mouth once daily.      atorvastatin (LIPITOR) 40 MG tablet TAKE ONE TABLET BY MOUTH AT BEDTIME FOR CHOLESTEROL 90 tablet 5    benazepril (LOTENSIN) 5 MG tablet Take 0.5 tablets (2.5 mg total) by mouth every evening. 90 tablet 5    carvedilol (COREG) 3.125 MG tablet TAKE ONE TABLET BY MOUTH TWO TIMES A DAY AS DIRECTED 180 tablet 4    clopidogrel (PLAVIX) 75 mg tablet TAKE ONE TABLET BY MOUTH ONCE DAILY 30 tablet 3    colchicine 0.6 mg tablet TAKE ONE CAPSULE BY MOUTH ONCE DAILY FOR GOUT 90 tablet 1    docusate sodium (COLACE) 100 MG capsule Take 100 mg by mouth as needed for Constipation.      ketoconazole (NIZORAL) 2 % cream aaa face twice daily prn scaling and redness 60 g 3    ketoconazole (NIZORAL) 2 % shampoo Wash face/beard area with medicated shampoo 3x/week 120 mL 5    lactulose (CHRONULAC) 10 gram/15 mL solution   5    lactulose (CHRONULAC) 20 gram/30 mL Soln Take 30 mLs (20 g total) by mouth once daily. 900 mL 5    multivitamin capsule Take 1 capsule by mouth once daily.      omega-3 fatty acids Cap Take 1 capsule by mouth 2 (two) times daily.       oxyCODONE-acetaminophen (PERCOCET) 7.5-325 mg per tablet TAKE TWO TABLETS BY MOUTH EVERY 4 HOURS AS NEEDED FOR PAIN 90 tablet 0    tamsulosin (FLOMAX) 0.4 mg Cp24 TAKE ONE CAPSULE BY MOUTH ONCE DAILY 90 capsule 3  "   zolpidem (AMBIEN) 5 MG Tab TAKE ONE TABLET BY MOUTH EVERY NIGHT 90 tablet 0     No current facility-administered medications for this visit.        ROS  Review of Systems   Constitutional: Negative for appetite change and fatigue.   HENT: Negative for ear pain and hearing loss.    Eyes: Negative for visual disturbance.   Respiratory: Negative for cough, chest tightness and shortness of breath.    Cardiovascular: Negative for chest pain and palpitations.   Gastrointestinal: Negative for abdominal pain, blood in stool, constipation, diarrhea, nausea and vomiting.   Genitourinary: Negative for difficulty urinating.   Musculoskeletal: Positive for back pain (fall 6 weeks ago, landing on the left buttocks where his spinal stimulator battery is located.  Now with 1-2 weeks of pain overlying that area.). Negative for joint swelling, neck pain and neck stiffness.   Skin: Negative.    Neurological: Negative for weakness and headaches.   Psychiatric/Behavioral: Negative for dysphoric mood.       Physical Exam  Vitals:    02/06/18 1456   BP: (!) 122/56   Pulse: (!) 56   Resp: 16    Body mass index is 23.38 kg/m².  Weight: 67.7 kg (149 lb 4 oz)   Height: 5' 7" (170.2 cm)     Physical Exam   Constitutional: He is oriented to person, place, and time. He appears well-developed and well-nourished. No distress.   HENT:   Head: Normocephalic and atraumatic.   Right Ear: External ear normal.   Left Ear: External ear normal.   Eyes: Conjunctivae and EOM are normal. Pupils are equal, round, and reactive to light. No scleral icterus.   Neck: Normal range of motion. Neck supple. No JVD present. No thyromegaly present.   Cardiovascular: Normal rate, regular rhythm and intact distal pulses.  Exam reveals no gallop and no friction rub.    No murmur heard.  Pulmonary/Chest: Effort normal and breath sounds normal. He has no wheezes. He has no rales.   Abdominal: Soft. Bowel sounds are normal. He exhibits no distension and no mass. There is " no tenderness.   Musculoskeletal: Normal range of motion. He exhibits no edema or tenderness.   Lymphadenopathy:     He has no cervical adenopathy.   Neurological: He is alert and oriented to person, place, and time. No cranial nerve deficit. Coordination normal.   Skin: Skin is warm and dry. No rash noted.   Psychiatric: He has a normal mood and affect.   Vitals reviewed.      Health Maintenance       Date Due Completion Date    TETANUS VACCINE 10/18/1950 ---    Sign Pain Contract 10/18/1950 ---    Complete Opioid Risk Tool 10/18/1950 ---    Influenza Vaccine 08/01/2017 3/2/2016    Lipid Panel 05/24/2022 5/24/2017          Assessment & Plan    Routine health maintenance  - Health maintenance reviewed  - Diet and exercise education.    Congestive heart failure, unspecified congestive heart failure chronicity, unspecified congestive heart failure type with Aortic atherosclerosis  - Continue current therapy  - Serial blood pressure monitoring  - Diet and exercise education.  - Continue Cardiology    Pulmonary fibrosis  - Stable, Continue current therapy    DDD (degenerative disc disease), lumbar with Opioid type dependence, continuous  -     X-Ray Lumbar Spine Ap And Lateral; Future; Expected date: 02/06/2018  -     X-Ray Hips Bilateral 2 View Inc AP Pelvis; Future; Expected date: 02/06/2018  - Continue current therapy  - Follow-up in about 3 months (around 5/6/2018).

## 2018-02-13 RX ORDER — OXYCODONE AND ACETAMINOPHEN 7.5; 325 MG/1; MG/1
TABLET ORAL
Qty: 90 TABLET | Refills: 0 | Status: SHIPPED | OUTPATIENT
Start: 2018-02-13 | End: 2018-03-15 | Stop reason: SDUPTHER

## 2018-02-20 RX ORDER — COLCHICINE 0.6 MG/1
TABLET, FILM COATED ORAL
Qty: 90 TABLET | Refills: 0 | Status: SHIPPED | OUTPATIENT
Start: 2018-02-20 | End: 2019-01-07 | Stop reason: SDUPTHER

## 2018-02-26 ENCOUNTER — OFFICE VISIT (OUTPATIENT)
Dept: ORTHOPEDICS | Facility: CLINIC | Age: 83
End: 2018-02-26
Payer: MEDICARE

## 2018-02-26 VITALS — HEIGHT: 67 IN | WEIGHT: 149 LBS | BODY MASS INDEX: 23.39 KG/M2

## 2018-02-26 DIAGNOSIS — M47.816 LUMBAR SPONDYLOSIS: Primary | ICD-10-CM

## 2018-02-26 PROCEDURE — 3008F BODY MASS INDEX DOCD: CPT | Mod: S$GLB,,, | Performed by: ORTHOPAEDIC SURGERY

## 2018-02-26 PROCEDURE — 99203 OFFICE O/P NEW LOW 30 MIN: CPT | Mod: S$GLB,,, | Performed by: ORTHOPAEDIC SURGERY

## 2018-02-26 PROCEDURE — 1126F AMNT PAIN NOTED NONE PRSNT: CPT | Mod: S$GLB,,, | Performed by: ORTHOPAEDIC SURGERY

## 2018-02-26 PROCEDURE — 99999 PR PBB SHADOW E&M-EST. PATIENT-LVL II: CPT | Mod: PBBFAC,,, | Performed by: ORTHOPAEDIC SURGERY

## 2018-02-26 PROCEDURE — 1159F MED LIST DOCD IN RCRD: CPT | Mod: S$GLB,,, | Performed by: ORTHOPAEDIC SURGERY

## 2018-02-26 PROCEDURE — 99499 UNLISTED E&M SERVICE: CPT | Mod: S$GLB,,, | Performed by: ORTHOPAEDIC SURGERY

## 2018-02-27 NOTE — PROGRESS NOTES
DATE: 2/26/2018  PATIENT: Kashmir Parikh  REFERRING MD:  CHIEF COMPLAINT:   Chief Complaint   Patient presents with    Hip Injury     left        HISTORY:  Kashmir Parikh is a 85 y.o. male  who presents for initial evaluation of left hip pain.  States she is well until 2 months ago when he sustained a fall onto his left side.  It moderate amount of discomfort and pain radiating to the left hip.  He states he is limping for approximately 2 weeks.  He reports the pain is better.  His past history is remarkable for a spinal cord stimulator placed in 2011.  Significant degenerative changes and lumbar spine.  Because of the intermittent pain in his hip in the past his wife requests that he be evaluated by an orthopedic surgeon.  Pain currently is 0/10.  She is questioning whether physical therapy will help prevent pain from her      PAST MEDICAL/SURGICAL HISTORY:  Past Medical History:   Diagnosis Date    BPH (benign prostatic hypertrophy)     CAD (coronary artery disease) 1/8/2013 4/2016 failed  of LAD 12/12 LAD-35%, OM 75%, small RCA    Cataract     OU    CHF (congestive heart failure) 3/9/2016    Coronary artery disease     Diverticulitis     Gout, arthritis     HEARING LOSS     Heart murmur     asymptomatic    HTN (hypertension)     Pt states resolved    Hyperlipidemia     Myocardial infarction     Recurrent nephrolithiasis      Past Surgical History:   Procedure Laterality Date    AORTIC VALVE REPLACEMENT  12/05/2012    #21 Medtronic Mosaic tissue valve    CARDIAC SURGERY      CARPAL TUNNEL RELEASE  06/06    bilateral    CHOLECYSTECTOMY      COLONOSCOPY      CORONARY ANGIOPLASTY WITH STENT PLACEMENT      EYE SURGERY      retinal detachment x 2    HAND SURGERY      HERNIA REPAIR      umbilical    JOINT REPLACEMENT      bilateral knee    RETINAL DETACHMENT SURGERY Bilateral 1980    RD spontaneous OU    ROTATOR CUFF REPAIR      bilateral    SPINAL CORD STIMULATOR IMPLANT  11/15/2011     SUBTOTAL COLECTOMY         Current Medications:   Current Outpatient Prescriptions:     acetaminophen (TYLENOL) 325 MG tablet, Take 1 tablet (325 mg total) by mouth every 6 (six) hours as needed for Pain., Disp: , Rfl:     aspirin 81 MG Chew, Take 81 mg by mouth once daily., Disp: , Rfl:     atorvastatin (LIPITOR) 40 MG tablet, TAKE ONE TABLET BY MOUTH AT BEDTIME FOR CHOLESTEROL, Disp: 90 tablet, Rfl: 5    benazepril (LOTENSIN) 5 MG tablet, Take 0.5 tablets (2.5 mg total) by mouth every evening., Disp: 90 tablet, Rfl: 5    carvedilol (COREG) 3.125 MG tablet, TAKE ONE TABLET BY MOUTH TWO TIMES A DAY AS DIRECTED, Disp: 180 tablet, Rfl: 4    clopidogrel (PLAVIX) 75 mg tablet, TAKE ONE TABLET BY MOUTH ONCE DAILY, Disp: 30 tablet, Rfl: 3    COLCRYS 0.6 mg tablet, TAKE ONE TABLET BY MOUTH ONCE DAILY FOR GOUT, Disp: 90 tablet, Rfl: 0    docusate sodium (COLACE) 100 MG capsule, Take 100 mg by mouth as needed for Constipation., Disp: , Rfl:     ketoconazole (NIZORAL) 2 % cream, aaa face twice daily prn scaling and redness, Disp: 60 g, Rfl: 3    ketoconazole (NIZORAL) 2 % shampoo, Wash face/beard area with medicated shampoo 3x/week, Disp: 120 mL, Rfl: 5    lactulose (CHRONULAC) 10 gram/15 mL solution, , Disp: , Rfl: 5    lactulose (CHRONULAC) 20 gram/30 mL Soln, Take 30 mLs (20 g total) by mouth once daily., Disp: 900 mL, Rfl: 5    multivitamin capsule, Take 1 capsule by mouth once daily., Disp: , Rfl:     omega-3 fatty acids Cap, Take 1 capsule by mouth 2 (two) times daily. , Disp: , Rfl:     oxyCODONE-acetaminophen (PERCOCET) 7.5-325 mg per tablet, TAKE TWO TABLETS BY MOUTH EVERY 4 HOURS AS NEEDED FOR PAIN, Disp: 90 tablet, Rfl: 0    tamsulosin (FLOMAX) 0.4 mg Cp24, TAKE ONE CAPSULE BY MOUTH ONCE DAILY, Disp: 90 capsule, Rfl: 3    zolpidem (AMBIEN) 5 MG Tab, TAKE ONE TABLET BY MOUTH EVERY NIGHT, Disp: 90 tablet, Rfl: 0    Family History: family history was reviewed and is noncontributory  Social  "History:   Social History     Social History    Marital status:      Spouse name: N/A    Number of children: N/A    Years of education: N/A     Occupational History    Not on file.     Social History Main Topics    Smoking status: Former Smoker     Years: 10.00     Quit date: 8/7/1962    Smokeless tobacco: Never Used    Alcohol use Yes      Comment: rarely    Drug use: No    Sexual activity: No     Other Topics Concern    Not on file     Social History Narrative    No narrative on file       ROS:  Constitution: Negative for chills, fever, and sweats. Negative for unexplained weight loss.  HENT: Negative for headaches and blurry vision.   Cardiovascular: Negative for chest pain, irregular heartbeat, leg swelling and palpitations.   Respiratory: Negative for cough and shortness of breath.   Gastrointestinal: Negative for abdominal pain, heartburn, nausea and vomiting.   Genitourinary: Negative for bladder incontinence and dysuria.   Musculoskeletal: Negative for systemic arthritis, muscle weakness and myalgias.   Neurological: Negative for numbness.   Psychiatric/Behavioral: Negative for depression.  Endocrine: Negative for polyuria.   Hematologic/Lymphatic: Negative for bleeding disorders.   Skin: Negative for poor wound healing.        PHYSICAL EXAM:  Ht 5' 7" (1.702 m)   Wt 67.6 kg (149 lb)   BMI 23.34 kg/m²   Kashmir G Kati is a well developed, well nourished male in no acute distress. Physical examination of the left hip and lumbar spine evaluated the following:    Gait and Alignment  Lumbar spine range of motion  Inspection for ecchymosis, swelling, atrophy, or deformity  Tenderness to palpation over the bony and soft tissue structures around the lower back/hip  Inspection for intra-articular and/or bursal effusions  Range of Motion and presence of contractures  Sensation and motor strength to the lower extremity  Pain/pop/click with logrolling or range of motion  Straight leg raise " testing  Vascular exam to include skin temperature/color/capillary refill    Remarkable findings included:  Mild scoliotic deformity.  Decreased range of motion of flexion.  PIP level stimulator seen about the left abdomen.  Sensation is intact L2-S1.  Motor exam grossly within normal limits to both lower extremities.  Straight leg raise testing does produce back pain on the left.  Reflexes decreased but symmetric              IMAGING:   X-rays of the lumbar spine and left hip are personally reviewed.  No acute fractures are seen.  Significant degenerative changes with a degenerative scoliosis of the lumbar spine and multilevel spondylosis noted.  Minimal degenerative changes without joint space narrowing left identified.  Spinal cord stimulator see to the left lower abdomen     ASSESSMENT:   Lumbar spondylosis with left lower extremity radiculitis    PLAN:  The nature of the diagnosis, using models and diagrams when appropriate, was explained to the patient and his wife in detail.  I have explained that currently asymptomatic.  He thinks exercise or a good core strengthening program may minimize recurrence but will not benefit.  At this point he like to observe his symptoms.  Activities may be performed as tolerated.  Should he have further problems, I will see him back.  Otherwise, follow-up as needed.      This note was dictated using voice recognition software. Please excuse any grammatical or typographical errors.

## 2018-03-15 RX ORDER — BENAZEPRIL HYDROCHLORIDE 5 MG/1
TABLET ORAL
Qty: 30 TABLET | Refills: 11 | Status: SHIPPED | OUTPATIENT
Start: 2018-03-15 | End: 2018-04-06 | Stop reason: SDUPTHER

## 2018-03-17 RX ORDER — OXYCODONE AND ACETAMINOPHEN 7.5; 325 MG/1; MG/1
TABLET ORAL
Qty: 90 TABLET | Refills: 0 | Status: SHIPPED | OUTPATIENT
Start: 2018-03-17 | End: 2018-04-17 | Stop reason: SDUPTHER

## 2018-03-26 ENCOUNTER — CLINICAL SUPPORT (OUTPATIENT)
Dept: CARDIOLOGY | Facility: CLINIC | Age: 83
End: 2018-03-26
Attending: INTERNAL MEDICINE
Payer: MEDICARE

## 2018-03-26 ENCOUNTER — LAB VISIT (OUTPATIENT)
Dept: LAB | Facility: HOSPITAL | Age: 83
End: 2018-03-26
Attending: INTERNAL MEDICINE
Payer: MEDICARE

## 2018-03-26 DIAGNOSIS — I77.9 BILATERAL CAROTID ARTERY DISEASE: ICD-10-CM

## 2018-03-26 DIAGNOSIS — Z95.1 S/P CABG X 1: ICD-10-CM

## 2018-03-26 DIAGNOSIS — Z95.2 S/P AVR (AORTIC VALVE REPLACEMENT): ICD-10-CM

## 2018-03-26 DIAGNOSIS — I25.10 CORONARY ARTERY DISEASE INVOLVING NATIVE CORONARY ARTERY OF NATIVE HEART WITHOUT ANGINA PECTORIS: ICD-10-CM

## 2018-03-26 DIAGNOSIS — E78.5 DYSLIPIDEMIA: ICD-10-CM

## 2018-03-26 LAB
ALBUMIN SERPL BCP-MCNC: 3.6 G/DL
ALP SERPL-CCNC: 81 U/L
ALT SERPL W/O P-5'-P-CCNC: 13 U/L
ANION GAP SERPL CALC-SCNC: 7 MMOL/L
AST SERPL-CCNC: 19 U/L
BASOPHILS # BLD AUTO: 0.02 K/UL
BASOPHILS NFR BLD: 0.5 %
BILIRUB SERPL-MCNC: 0.7 MG/DL
BNP SERPL-MCNC: 562 PG/ML
BUN SERPL-MCNC: 20 MG/DL
CALCIUM SERPL-MCNC: 10 MG/DL
CHLORIDE SERPL-SCNC: 107 MMOL/L
CHOLEST SERPL-MCNC: 137 MG/DL
CHOLEST/HDLC SERPL: 3.7 {RATIO}
CO2 SERPL-SCNC: 30 MMOL/L
CREAT SERPL-MCNC: 1 MG/DL
DIFFERENTIAL METHOD: ABNORMAL
EOSINOPHIL # BLD AUTO: 0.1 K/UL
EOSINOPHIL NFR BLD: 1.7 %
ERYTHROCYTE [DISTWIDTH] IN BLOOD BY AUTOMATED COUNT: 12.5 %
EST. GFR  (AFRICAN AMERICAN): >60 ML/MIN/1.73 M^2
EST. GFR  (NON AFRICAN AMERICAN): >60 ML/MIN/1.73 M^2
GLUCOSE SERPL-MCNC: 117 MG/DL
HCT VFR BLD AUTO: 37.7 %
HDLC SERPL-MCNC: 37 MG/DL
HDLC SERPL: 27 %
HGB BLD-MCNC: 12.1 G/DL
IMM GRANULOCYTES # BLD AUTO: 0.01 K/UL
IMM GRANULOCYTES NFR BLD AUTO: 0.2 %
LDLC SERPL CALC-MCNC: 75.4 MG/DL
LYMPHOCYTES # BLD AUTO: 1.4 K/UL
LYMPHOCYTES NFR BLD: 34 %
MCH RBC QN AUTO: 31.1 PG
MCHC RBC AUTO-ENTMCNC: 32.1 G/DL
MCV RBC AUTO: 97 FL
MONOCYTES # BLD AUTO: 0.6 K/UL
MONOCYTES NFR BLD: 13.7 %
NEUTROPHILS # BLD AUTO: 2.1 K/UL
NEUTROPHILS NFR BLD: 49.9 %
NONHDLC SERPL-MCNC: 100 MG/DL
NRBC BLD-RTO: 0 /100 WBC
PLATELET # BLD AUTO: 195 K/UL
PMV BLD AUTO: 10.4 FL
POTASSIUM SERPL-SCNC: 4.7 MMOL/L
PROT SERPL-MCNC: 6.3 G/DL
RBC # BLD AUTO: 3.89 M/UL
SODIUM SERPL-SCNC: 144 MMOL/L
TRIGL SERPL-MCNC: 123 MG/DL
WBC # BLD AUTO: 4.24 K/UL

## 2018-03-26 PROCEDURE — 85025 COMPLETE CBC W/AUTO DIFF WBC: CPT

## 2018-03-26 PROCEDURE — 80061 LIPID PANEL: CPT

## 2018-03-26 PROCEDURE — 83880 ASSAY OF NATRIURETIC PEPTIDE: CPT

## 2018-03-26 PROCEDURE — 36415 COLL VENOUS BLD VENIPUNCTURE: CPT | Mod: PO

## 2018-03-26 PROCEDURE — 80053 COMPREHEN METABOLIC PANEL: CPT

## 2018-03-26 PROCEDURE — 93306 TTE W/DOPPLER COMPLETE: CPT | Mod: S$GLB,,, | Performed by: INTERNAL MEDICINE

## 2018-03-27 LAB
AORTIC VALVE REGURGITATION: ABNORMAL
DIASTOLIC DYSFUNCTION: YES
ESTIMATED PA SYSTOLIC PRESSURE: 37.11
MITRAL VALVE MOBILITY: ABNORMAL
RETIRED EF AND QEF - SEE NOTES: 35 (ref 55–65)
TRICUSPID VALVE REGURGITATION: ABNORMAL

## 2018-04-06 ENCOUNTER — OFFICE VISIT (OUTPATIENT)
Dept: CARDIOLOGY | Facility: CLINIC | Age: 83
End: 2018-04-06
Payer: MEDICARE

## 2018-04-06 VITALS
HEIGHT: 67 IN | HEART RATE: 67 BPM | SYSTOLIC BLOOD PRESSURE: 147 MMHG | BODY MASS INDEX: 23.25 KG/M2 | DIASTOLIC BLOOD PRESSURE: 61 MMHG | WEIGHT: 148.13 LBS

## 2018-04-06 DIAGNOSIS — Z95.2 S/P AVR (AORTIC VALVE REPLACEMENT): Primary | ICD-10-CM

## 2018-04-06 DIAGNOSIS — Z95.1 S/P CABG X 1: ICD-10-CM

## 2018-04-06 DIAGNOSIS — I70.90 ATHEROSCLEROSIS: ICD-10-CM

## 2018-04-06 DIAGNOSIS — I10 ESSENTIAL HYPERTENSION: ICD-10-CM

## 2018-04-06 DIAGNOSIS — E78.5 DYSLIPIDEMIA: ICD-10-CM

## 2018-04-06 DIAGNOSIS — I25.10 CORONARY ARTERY DISEASE INVOLVING NATIVE CORONARY ARTERY OF NATIVE HEART WITHOUT ANGINA PECTORIS: ICD-10-CM

## 2018-04-06 DIAGNOSIS — I77.9 BILATERAL CAROTID ARTERY DISEASE: ICD-10-CM

## 2018-04-06 DIAGNOSIS — I25.5 ISCHEMIC CARDIOMYOPATHY: ICD-10-CM

## 2018-04-06 PROCEDURE — 3077F SYST BP >= 140 MM HG: CPT | Mod: CPTII,S$GLB,, | Performed by: INTERNAL MEDICINE

## 2018-04-06 PROCEDURE — 3078F DIAST BP <80 MM HG: CPT | Mod: CPTII,S$GLB,, | Performed by: INTERNAL MEDICINE

## 2018-04-06 PROCEDURE — 99214 OFFICE O/P EST MOD 30 MIN: CPT | Mod: S$GLB,,, | Performed by: INTERNAL MEDICINE

## 2018-04-06 PROCEDURE — 99999 PR PBB SHADOW E&M-EST. PATIENT-LVL III: CPT | Mod: PBBFAC,,, | Performed by: INTERNAL MEDICINE

## 2018-04-06 PROCEDURE — 99499 UNLISTED E&M SERVICE: CPT | Mod: S$PBB,,, | Performed by: INTERNAL MEDICINE

## 2018-04-06 RX ORDER — BENAZEPRIL HYDROCHLORIDE 5 MG/1
5 TABLET ORAL DAILY
Qty: 90 TABLET | Refills: 4 | Status: SHIPPED | OUTPATIENT
Start: 2018-04-06 | End: 2019-05-31 | Stop reason: SDUPTHER

## 2018-04-06 RX ORDER — CLOPIDOGREL BISULFATE 75 MG/1
75 TABLET ORAL DAILY
Qty: 90 TABLET | Refills: 4 | Status: SHIPPED | OUTPATIENT
Start: 2018-04-06 | End: 2019-04-12 | Stop reason: SDUPTHER

## 2018-04-06 RX ORDER — ATORVASTATIN CALCIUM 40 MG/1
40 TABLET, FILM COATED ORAL NIGHTLY
Qty: 90 TABLET | Refills: 5 | Status: SHIPPED | OUTPATIENT
Start: 2018-04-06 | End: 2019-05-21 | Stop reason: SDUPTHER

## 2018-04-06 RX ORDER — CARVEDILOL 3.12 MG/1
TABLET ORAL
Qty: 180 TABLET | Refills: 4 | Status: SHIPPED | OUTPATIENT
Start: 2018-04-06 | End: 2019-04-08 | Stop reason: SDUPTHER

## 2018-04-06 NOTE — PROGRESS NOTES
Subjective:    Patient ID:  Kashmir Parikh is a 85 y.o. male who presents for follow-up of No chief complaint on file.      HPI  Here for f/u of AVR/CABG (2012). Patients states is doing well no chest pain, SOB or change in exertional tolerence. Patient does not exercise but remains very active with out change in exertional tolerance or chest pain.     Review of Systems   Constitution: Negative for malaise/fatigue.   Eyes: Negative for blurred vision.   Cardiovascular: Negative for chest pain, claudication, cyanosis, dyspnea on exertion, irregular heartbeat, leg swelling, near-syncope, orthopnea, palpitations, paroxysmal nocturnal dyspnea and syncope.   Respiratory: Negative for cough and shortness of breath.    Hematologic/Lymphatic: Does not bruise/bleed easily.   Musculoskeletal: Negative for back pain, falls, joint pain, muscle cramps, muscle weakness and myalgias.   Gastrointestinal: Negative for abdominal pain, change in bowel habit, nausea and vomiting.   Genitourinary: Negative for urgency.   Neurological: Negative for dizziness, focal weakness and light-headedness.        Objective:    Physical Exam   Constitutional: He is oriented to person, place, and time. He appears well-developed and well-nourished. He is cooperative.   HENT:   Head: Normocephalic and atraumatic.   Eyes: Conjunctivae are normal. Right eye exhibits no exudate. Left eye exhibits no exudate.   Neck: Normal range of motion. Neck supple. Normal carotid pulses and no JVD present. Carotid bruit is not present. No thyromegaly present.   Cardiovascular: Normal rate, regular rhythm, normal heart sounds and intact distal pulses.       Harsh midsystolic murmur is present  at the upper right sternal border radiating to the neck  Pulses:       Carotid pulses are 2+ on the right side, and 2+ on the left side.       Radial pulses are 2+ on the right side, and 2+ on the left side.        Dorsalis pedis pulses are 2+ on the right side, and 2+ on the  left side.        Posterior tibial pulses are 2+ on the right side, and 2+ on the left side.   Well healed midline sternal incision.     Pulmonary/Chest: Effort normal and breath sounds normal.   Abdominal: Soft. Bowel sounds are normal.   Musculoskeletal: Normal range of motion. He exhibits no edema.   Neurological: He is alert and oriented to person, place, and time. Gait normal.   Skin: Skin is warm, dry and intact. No cyanosis. Nails show no clubbing.   Psychiatric: He has a normal mood and affect. His speech is normal and behavior is normal. Judgment and thought content normal.   Nursing note and vitals reviewed.            ..    Chemistry        Component Value Date/Time     03/26/2018 1122    K 4.7 03/26/2018 1122     03/26/2018 1122    CO2 30 (H) 03/26/2018 1122    BUN 20 03/26/2018 1122    CREATININE 1.0 03/26/2018 1122     (H) 03/26/2018 1122        Component Value Date/Time    CALCIUM 10.0 03/26/2018 1122    ALKPHOS 81 03/26/2018 1122    AST 19 03/26/2018 1122    AST 28 03/08/2016 2105    ALT 13 03/26/2018 1122    BILITOT 0.7 03/26/2018 1122    ESTGFRAFRICA >60.0 03/26/2018 1122    EGFRNONAA >60.0 03/26/2018 1122            ..  Lab Results   Component Value Date    CHOL 137 03/26/2018    CHOL 120 05/24/2017    CHOL 154 08/18/2015     Lab Results   Component Value Date    HDL 37 (L) 03/26/2018    HDL 35 (L) 05/24/2017    HDL 37 (L) 08/18/2015     Lab Results   Component Value Date    LDLCALC 75.4 03/26/2018    LDLCALC 61.4 (L) 05/24/2017    LDLCALC 89.0 08/18/2015     Lab Results   Component Value Date    TRIG 123 03/26/2018    TRIG 118 05/24/2017    TRIG 140 08/18/2015     Lab Results   Component Value Date    CHOLHDL 27.0 03/26/2018    CHOLHDL 29.2 05/24/2017    CHOLHDL 24.0 08/18/2015     ..  Lab Results   Component Value Date    WBC 4.24 03/26/2018    HGB 12.1 (L) 03/26/2018    HCT 37.7 (L) 03/26/2018    MCV 97 03/26/2018     03/26/2018       Test(s) Reviewed  I have reviewed  the following in detail:  [] Stress test   [] Angiography   [x] Echocardiogram   [x] Labs   [x] Other:       Assessment:         ICD-10-CM ICD-9-CM   1. S/P AVR (aortic valve replacement) Z95.2 V43.3   2. S/P CABG x 1 Z95.1 V45.81   3. Bilateral carotid artery disease I77.9 447.9   4. Essential hypertension I10 401.9   5. Coronary artery disease involving native coronary artery of native heart without angina pectoris I25.10 414.01   6. Ischemic cardiomyopathy I25.5 414.8   7. Dyslipidemia E78.5 272.4     Problem List Items Addressed This Visit     Bilateral carotid artery disease    CAD (coronary artery disease)    Overview     4/2016 failed  of LAD  12/12 LAD-35%, OM 75%, small RCA         Cardiomyopathy    Dyslipidemia    Essential hypertension    S/P AVR (aortic valve replacement) - Primary    Overview     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 mosaic         S/P CABG x 1    Overview     12/12 VG-OM                Plan:           Return to clinic 1 year   Low level/low impact aerobic exercise 5x's/wk. Heart healthy diet and risk factor modification.    See labs and med orders.  Doing well . Medical mgt

## 2018-04-17 RX ORDER — OXYCODONE AND ACETAMINOPHEN 7.5; 325 MG/1; MG/1
TABLET ORAL
Qty: 90 TABLET | Refills: 0 | Status: SHIPPED | OUTPATIENT
Start: 2018-04-17 | End: 2018-05-17 | Stop reason: SDUPTHER

## 2018-05-16 ENCOUNTER — PES CALL (OUTPATIENT)
Dept: ADMINISTRATIVE | Facility: CLINIC | Age: 83
End: 2018-05-16

## 2018-05-21 RX ORDER — OXYCODONE AND ACETAMINOPHEN 7.5; 325 MG/1; MG/1
TABLET ORAL
Qty: 90 TABLET | Refills: 0 | Status: SHIPPED | OUTPATIENT
Start: 2018-05-21 | End: 2018-06-22 | Stop reason: SDUPTHER

## 2018-05-24 RX ORDER — LACTULOSE 10 G/15ML
SOLUTION ORAL; RECTAL
Qty: 946 ML | Refills: 5 | Status: SHIPPED | OUTPATIENT
Start: 2018-05-24 | End: 2021-02-02

## 2018-06-08 ENCOUNTER — OFFICE VISIT (OUTPATIENT)
Dept: FAMILY MEDICINE | Facility: CLINIC | Age: 83
End: 2018-06-08
Payer: MEDICARE

## 2018-06-08 VITALS
BODY MASS INDEX: 23.18 KG/M2 | RESPIRATION RATE: 16 BRPM | WEIGHT: 147.69 LBS | HEIGHT: 67 IN | DIASTOLIC BLOOD PRESSURE: 52 MMHG | HEART RATE: 72 BPM | SYSTOLIC BLOOD PRESSURE: 130 MMHG

## 2018-06-08 DIAGNOSIS — J34.89 NASAL OBSTRUCTION: ICD-10-CM

## 2018-06-08 DIAGNOSIS — M51.36 DDD (DEGENERATIVE DISC DISEASE), LUMBAR: Primary | ICD-10-CM

## 2018-06-08 DIAGNOSIS — G47.00 INSOMNIA, UNSPECIFIED TYPE: ICD-10-CM

## 2018-06-08 PROCEDURE — 99214 OFFICE O/P EST MOD 30 MIN: CPT | Mod: S$GLB,,, | Performed by: FAMILY MEDICINE

## 2018-06-08 PROCEDURE — 3078F DIAST BP <80 MM HG: CPT | Mod: CPTII,S$GLB,, | Performed by: FAMILY MEDICINE

## 2018-06-08 PROCEDURE — 3075F SYST BP GE 130 - 139MM HG: CPT | Mod: CPTII,S$GLB,, | Performed by: FAMILY MEDICINE

## 2018-06-08 PROCEDURE — 99999 PR PBB SHADOW E&M-EST. PATIENT-LVL III: CPT | Mod: PBBFAC,,, | Performed by: FAMILY MEDICINE

## 2018-06-08 RX ORDER — TAMSULOSIN HYDROCHLORIDE 0.4 MG/1
1 CAPSULE ORAL DAILY
Qty: 90 CAPSULE | Refills: 3 | Status: SHIPPED | OUTPATIENT
Start: 2018-06-08 | End: 2019-06-10 | Stop reason: SDUPTHER

## 2018-06-08 RX ORDER — TRAZODONE HYDROCHLORIDE 50 MG/1
50 TABLET ORAL NIGHTLY PRN
Qty: 90 TABLET | Refills: 3 | Status: SHIPPED | OUTPATIENT
Start: 2018-06-08 | End: 2018-10-22

## 2018-06-08 NOTE — PROGRESS NOTES
Subjective     Patient presents today for review of chronic use of narcotic medications for control of pain.  Presently they report adequate control of their pain.  There is no evidence of inappropriate usage.    Review of Systems  Patient with a chronic history of left nasal obstruction.  Requesting ENT evaluation    Objective   Physical Exam   Constitutional: He is oriented to person, place, and time. He appears well-developed and well-nourished. No distress.   Neurological: He is alert and oriented to person, place, and time.   Vitals reviewed.    Vitals:    06/08/18 1430   BP: (!) 130/52   Pulse: 72   Resp: 16     MSE:  Normal mood, normal affect.  No suicidal or homicidal ideation.  No visual or auditory hallucinations.    Assessment   1. DDD (degenerative disc disease), lumbar     2. Nasal obstruction  Ambulatory consult to ENT       Plan   DDD (degenerative disc disease), lumbar with Insomnia  - Continue current therapy  - ADD Trazodone 50mg QHS  - Follow-up in about 3 months (around 9/8/2018).    Nasal obstruction  -     Ambulatory consult to ENT    Other orders  -     tamsulosin (FLOMAX) 0.4 mg Cp24; Take 1 capsule (0.4 mg total) by mouth once daily.  Dispense: 90 capsule; Refill: 3

## 2018-06-19 ENCOUNTER — OFFICE VISIT (OUTPATIENT)
Dept: OTOLARYNGOLOGY | Facility: CLINIC | Age: 83
End: 2018-06-19
Payer: MEDICARE

## 2018-06-19 VITALS — WEIGHT: 143.94 LBS | BODY MASS INDEX: 22.59 KG/M2 | HEIGHT: 67 IN

## 2018-06-19 DIAGNOSIS — T48.5X5A RHINITIS MEDICAMENTOSA: Primary | ICD-10-CM

## 2018-06-19 DIAGNOSIS — J34.3 NASAL TURBINATE HYPERTROPHY: ICD-10-CM

## 2018-06-19 DIAGNOSIS — J34.89 NASAL OBSTRUCTION: ICD-10-CM

## 2018-06-19 DIAGNOSIS — J34.2 NASAL SEPTAL DEVIATION: ICD-10-CM

## 2018-06-19 DIAGNOSIS — J31.0 RHINITIS MEDICAMENTOSA: Primary | ICD-10-CM

## 2018-06-19 PROCEDURE — 99213 OFFICE O/P EST LOW 20 MIN: CPT | Mod: S$GLB,,, | Performed by: OTOLARYNGOLOGY

## 2018-06-19 PROCEDURE — 99999 PR PBB SHADOW E&M-EST. PATIENT-LVL III: CPT | Mod: PBBFAC,,, | Performed by: OTOLARYNGOLOGY

## 2018-06-19 PROCEDURE — 99499 UNLISTED E&M SERVICE: CPT | Mod: S$PBB,,, | Performed by: OTOLARYNGOLOGY

## 2018-06-19 RX ORDER — FLUTICASONE PROPIONATE 50 MCG
2 SPRAY, SUSPENSION (ML) NASAL DAILY
Qty: 1 BOTTLE | Refills: 12 | Status: SHIPPED | OUTPATIENT
Start: 2018-06-19 | End: 2021-02-02

## 2018-06-19 NOTE — LETTER
June 19, 2018      Johny Ortiz MD  [address] [facility] [address] [place] [address]           [facility] - [facility]  [address] [facility] [address] [place] [address]  Phone: [phone]  Fax: [phone]          Patient: Kashmir Parikh   MR Number: [id number]   YOB: 1932   Date of Visit: 6/19/2018       Dear Dr. Johny Ortiz:    Thank you for referring Kashmir Parikh to me for evaluation. Attached you will find relevant portions of my assessment and plan of care.    If you have questions, please do not hesitate to call me. I look forward to following Kashmir Parikh along with you.    Sincerely,    Uziel Edwards MD    Enclosure  CC:  No Recipients    If you would like to receive this communication electronically, please contact [email or web address] or [phone] to request more information on [employer] Link access.    For providers and/or their staff who would like to refer a patient to [facility], please contact us through our one-stop-shop provider referral line, [facility], at [phone].    If you feel you have received this communication in error or would no longer like to receive these types of communications, please e-mail [email or web address]

## 2018-06-19 NOTE — PATIENT INSTRUCTIONS
STOP THE OVER THE COUNTER NASAL SPRAY AND USE THE MEDICINE TAMEKA PRESCRIBED    Nasal Steroid Spray    You have been prescribed or instructed to take a nasal steroid spray. Examples of this medication include Flonase (fluticasone), Nasacort (triamcinolone), and Rhinocort (budeosnide). Some symptoms will experience relief within 1-2 days; however, it may take other side effects 2-3 weeks to begin to see improvement. This medication needs to be taken consistently to see results.    Use as directed, spraying 1-2 times in each nostril per day.     Helpful hints for maximizing medication into the nose  - Use the opposite hand to spray the nostril (example: right hand for left nostril). This will help avoid spraying the medication onto the septum (the area that divides the left and right nasal cavity.)  - Tilt the bottle so that it is facing at a slight angle up or straight back, but avoid pointing the bottle straight up while spraying.   - Sniff in while you are spraying.    Side effects:  Overall, this is a well tolerated medication with low side effects. The benefit of nasal steroids as opposed to oral steroids is that the nasal steroid works primarily in the nose.  Common side effects: headache, nasal dryness, minor nose bleeds,   Rare side effects: septal perforation, elevated eye pressures, dry eyes, change in smell, allergic reaction.  Notify your doctor if you have any concerns or experience these symptoms.

## 2018-06-19 NOTE — PROGRESS NOTES
Subjective:       Patient ID: Kashmir Parikh is a 85 y.o. male.    Chief Complaint: blocked nasal passage    Kashmir is here for nasal osbtruction. Symptoms have been present for years. He reports left>right issues. Worse when he is supine. He has occasional rhinorrhea, but not every day. He has tried He uses a nightly decongestant for > 5 years.    Previous surgery: no nasal surgery, denies nasal trauma.     He has h/o opioid dependence.  Has CHF, CAD, Ht, h/o MI    History   Smoking Status    Former Smoker    Years: 10.00    Quit date: 8/7/1962   Smokeless Tobacco    Never Used     History   Alcohol Use    Yes     Comment: rarely          Review of Systems   Constitutional: Negative for activity change and appetite change.   Eyes: Negative for discharge.   Respiratory: Negative for difficulty breathing and wheezing   Cardiovascular: Negative for chest pain.   Gastrointestinal: Negative for abdominal distention and abdominal pain.   Endocrine: Negative for cold intolerance and heat intolerance.   Genitourinary: Negative for dysuria.   Musculoskeletal: Negative for gait problem and joint swelling.   Skin: Negative for color change and pallor.   Neurological: Negative for syncope and weakness.   Psychiatric/Behavioral: Negative for agitation and confusion.     Objective:        Constitutional:   He is oriented to person, place, and time. He appears well-developed and well-nourished. He appears alert. He is active. Normal speech.      Head:  Normocephalic and atraumatic. Head is without TMJ tenderness. No scars. Salivary glands normal.  Facial strength is normal.      Ears:    Right Ear: No drainage or swelling. No middle ear effusion.   Left Ear: No drainage or swelling.  No middle ear effusion.     Nose:  Septal deviation (severe bilateral, left inferior blocking > 50 % of external nasal valve with contact into . Right moderate superior deviation anterior) present. No mucosal edema, rhinorrhea or  sinus tenderness. Turbinate hypertrophy (mild).      Mouth/Throat  Oropharynx clear and moist without lesions or asymmetry, normal uvula midline and mirror exam normal. Normal dentition. No uvula swelling, lacerations or trismus. No oropharyngeal exudate. Tonsillar erythema, tonsillar exudate.      Neck:  Full range of motion with neck supple and no adenopathy. Thyroid tenderness is present. No tracheal deviation, no edema, no erythema, normal range of motion, no stridor, no crepitus and no neck rigidity present. No thyroid mass present.     Cardiovascular:   Intact distal pulses and normal pulses.      Pulmonary/Chest:   Effort normal and breath sounds normal. No stridor.     Psychiatric:   His speech is normal and behavior is normal. His mood appears not anxious. His affect is not labile.     Neurological:   He is alert and oriented to person, place, and time. No sensory deficit.     Skin:   No abrasions, lacerations, lesions, or rashes. No abrasion and no bruising noted.         Tests / Results:  None    Assessment:       1. Rhinitis medicamentosa    2. Nasal obstruction    3. Nasal septal deviation    4. Nasal turbinate hypertrophy          Plan:         Stop Oxymetazoline  Flonase  Follow-up 6 weeks  Discussed that septoplasty +/- ITR may be option, but will see how he does.

## 2018-06-20 ENCOUNTER — TELEPHONE (OUTPATIENT)
Dept: OTOLARYNGOLOGY | Facility: CLINIC | Age: 83
End: 2018-06-20

## 2018-06-20 RX ORDER — OXYCODONE AND ACETAMINOPHEN 7.5; 325 MG/1; MG/1
TABLET ORAL
Qty: 90 TABLET | Refills: 0 | OUTPATIENT
Start: 2018-06-20

## 2018-06-20 NOTE — TELEPHONE ENCOUNTER
----- Message from Jessica Mccall sent at 6/20/2018  1:21 PM CDT -----   Patient  Wife  Is  Calling to inform  The nurse about over the  Counter  Nose  Spray  Has the  following ingredients Oxymetazoline ,hydrodchloride 05% // please call lesley wife /[phone]

## 2018-06-20 NOTE — TELEPHONE ENCOUNTER
I spoke with patients wife they will stop the nasal spray they are using and  the Flonase.  Thanks

## 2018-06-25 RX ORDER — OXYCODONE AND ACETAMINOPHEN 7.5; 325 MG/1; MG/1
TABLET ORAL
Qty: 90 TABLET | Refills: 0 | Status: SHIPPED | OUTPATIENT
Start: 2018-06-25 | End: 2018-07-25 | Stop reason: SDUPTHER

## 2018-07-16 RX ORDER — OXYCODONE AND ACETAMINOPHEN 7.5; 325 MG/1; MG/1
TABLET ORAL
Qty: 90 TABLET | Refills: 0 | OUTPATIENT
Start: 2018-07-16

## 2018-07-23 ENCOUNTER — TELEPHONE (OUTPATIENT)
Dept: CARDIOLOGY | Facility: CLINIC | Age: 83
End: 2018-07-23

## 2018-07-26 ENCOUNTER — TELEPHONE (OUTPATIENT)
Dept: FAMILY MEDICINE | Facility: CLINIC | Age: 83
End: 2018-07-26

## 2018-07-26 RX ORDER — OXYCODONE AND ACETAMINOPHEN 7.5; 325 MG/1; MG/1
TABLET ORAL
Qty: 90 TABLET | Refills: 0 | Status: SHIPPED | OUTPATIENT
Start: 2018-07-26 | End: 2018-08-27 | Stop reason: SDUPTHER

## 2018-07-26 NOTE — TELEPHONE ENCOUNTER
----- Message from Colton Chang sent at 7/25/2018  4:29 PM CDT -----  Type:  RX Refill Request    Who Called:  Wife/Crystal  Refill or New Rx:  #Refill  RX Name and Strength:  oxyCODONE-acetaminophen (PERCOCET) 7.5-325 mg per tablet  How is the patient currently taking it? (ex. 1XDay): 2X mouth every 4 hours  Is this a 30 day or 90 day RX:  90  Preferred Pharmacy with phone number:    [facility] - [place], [place] - [address]  [address]  [place] [address]  Phone: [phone] Fax: [phone]  Local or Mail Order:  Local  Ordering Provider:  Diana Waldrop Call Back Number:  [phone]  Additional Information:  Please call to advise

## 2018-07-30 ENCOUNTER — TELEPHONE (OUTPATIENT)
Dept: CARDIOLOGY | Facility: CLINIC | Age: 83
End: 2018-07-30

## 2018-07-30 NOTE — TELEPHONE ENCOUNTER
----- Message from Jessica Mccall sent at 7/30/2018  8:12 AM CDT -----    kathia hassan  With   Dr Lobo  Office  Is  Calling  Cardiac  Clearance   For  Teeth  Extraction // please call [phone] // fax: [phone]// placed a call to pod

## 2018-08-01 ENCOUNTER — OFFICE VISIT (OUTPATIENT)
Dept: OTOLARYNGOLOGY | Facility: CLINIC | Age: 83
End: 2018-08-01
Payer: MEDICARE

## 2018-08-01 VITALS — HEIGHT: 67 IN | BODY MASS INDEX: 22.54 KG/M2 | WEIGHT: 143.63 LBS

## 2018-08-01 DIAGNOSIS — J34.3 NASAL TURBINATE HYPERTROPHY: ICD-10-CM

## 2018-08-01 DIAGNOSIS — T48.5X5A RHINITIS MEDICAMENTOSA: ICD-10-CM

## 2018-08-01 DIAGNOSIS — J34.89 NASAL OBSTRUCTION: Primary | ICD-10-CM

## 2018-08-01 DIAGNOSIS — J34.2 NASAL SEPTAL DEVIATION: ICD-10-CM

## 2018-08-01 DIAGNOSIS — J31.0 RHINITIS MEDICAMENTOSA: ICD-10-CM

## 2018-08-01 PROCEDURE — 99999 PR PBB SHADOW E&M-EST. PATIENT-LVL II: CPT | Mod: PBBFAC,,, | Performed by: OTOLARYNGOLOGY

## 2018-08-01 PROCEDURE — 99213 OFFICE O/P EST LOW 20 MIN: CPT | Mod: S$GLB,,, | Performed by: OTOLARYNGOLOGY

## 2018-08-01 NOTE — PROGRESS NOTES
Subjective:       Patient ID: Kashmir Parikh is a 85 y.o. male.    Chief Complaint: Follow-up (f/u recheck nasal obstruction)    Kashmir is here for follow-up of nasal obstruction. He is feeling much better with Flonase and has been able to stop the Afrin. Breathing well out of both sides. No epistaxis.    Review of Systems   Constitutional: Negative for activity change and appetite change.   Respiratory: Negative for difficulty breathing and wheezing   Cardiovascular: Negative for chest pain.      Objective:        Constitutional:   Vital signs are normal. He appears well-developed and well-nourished.     Head:  Normocephalic and atraumatic.     Ears:  Hearing normal to normal and whispered voice; external ear normal without scars, lesions, or masses; ear canal, tympanic membrane, and middle ear normal..     Nose:  Septal deviation (severe anterior left contacting IT) present. No mucosal edema or rhinorrhea. No turbinate hypertrophy.      Mouth/Throat  Lips, teeth, and gums normal and oropharynx normal.         Tests / Results:  none    Assessment:       1. Nasal obstruction    2. Nasal septal deviation    3. Nasal turbinate hypertrophy    4. Rhinitis medicamentosa          Plan:         Continue Flonase, educated on proper administration to reduce epistaxis  Avoid Afrin  FU prn

## 2018-08-27 RX ORDER — OXYCODONE AND ACETAMINOPHEN 7.5; 325 MG/1; MG/1
TABLET ORAL
Qty: 90 TABLET | Refills: 0 | Status: SHIPPED | OUTPATIENT
Start: 2018-08-27 | End: 2018-09-10

## 2018-09-10 ENCOUNTER — OFFICE VISIT (OUTPATIENT)
Dept: FAMILY MEDICINE | Facility: CLINIC | Age: 83
End: 2018-09-10
Payer: MEDICARE

## 2018-09-10 VITALS
HEIGHT: 67 IN | OXYGEN SATURATION: 97 % | BODY MASS INDEX: 22.94 KG/M2 | SYSTOLIC BLOOD PRESSURE: 130 MMHG | RESPIRATION RATE: 16 BRPM | DIASTOLIC BLOOD PRESSURE: 54 MMHG | WEIGHT: 146.19 LBS | HEART RATE: 58 BPM

## 2018-09-10 DIAGNOSIS — M51.36 DDD (DEGENERATIVE DISC DISEASE), LUMBAR: Primary | ICD-10-CM

## 2018-09-10 LAB
AMP D-AMPHETAMINE 1000 NG/ML: NEGATIVE
BAR SECOBARBITAL 300 NG/ML: NEGATIVE
BUP BUPRENORPHINE 10 NG/ML: NEGATIVE
BZO OXAZEPAM 300 NG/ML: NEGATIVE
COC BENZOYLECGONINE 300 NG/ML: NEGATIVE
CTP QC/QA: YES
MET D-METHAMPHETAMINE 500 NG/ML: NEGATIVE
MOP MORPHINE 300 NG/ML: NEGATIVE
MTD METHADONE 300 NG/ML: NEGATIVE
QXY OXYCODONE 100 NG/ML: POSITIVE
THC 11-NOR-9-TETRAHYDROCANNABINOL-9-CARBOXYLIC ACID: NEGATIVE

## 2018-09-10 PROCEDURE — 99213 OFFICE O/P EST LOW 20 MIN: CPT | Mod: PBBFAC,PO,25 | Performed by: FAMILY MEDICINE

## 2018-09-10 PROCEDURE — 3078F DIAST BP <80 MM HG: CPT | Mod: CPTII,,, | Performed by: FAMILY MEDICINE

## 2018-09-10 PROCEDURE — 99999 PR PBB SHADOW E&M-EST. PATIENT-LVL III: CPT | Mod: PBBFAC,,, | Performed by: FAMILY MEDICINE

## 2018-09-10 PROCEDURE — 90662 IIV NO PRSV INCREASED AG IM: CPT | Mod: PBBFAC,PO

## 2018-09-10 PROCEDURE — 80305 DRUG TEST PRSMV DIR OPT OBS: CPT | Mod: PBBFAC,PO | Performed by: FAMILY MEDICINE

## 2018-09-10 PROCEDURE — 99213 OFFICE O/P EST LOW 20 MIN: CPT | Mod: S$PBB,,, | Performed by: FAMILY MEDICINE

## 2018-09-10 PROCEDURE — 3075F SYST BP GE 130 - 139MM HG: CPT | Mod: CPTII,,, | Performed by: FAMILY MEDICINE

## 2018-09-10 PROCEDURE — 1101F PT FALLS ASSESS-DOCD LE1/YR: CPT | Mod: CPTII,,, | Performed by: FAMILY MEDICINE

## 2018-09-10 RX ORDER — OXYCODONE AND ACETAMINOPHEN 7.5; 325 MG/1; MG/1
1 TABLET ORAL
Qty: 30 TABLET | Refills: 0 | Status: SHIPPED | OUTPATIENT
Start: 2018-10-10 | End: 2018-12-08 | Stop reason: SDUPTHER

## 2018-09-10 RX ORDER — OXYCODONE AND ACETAMINOPHEN 7.5; 325 MG/1; MG/1
1 TABLET ORAL
Qty: 30 TABLET | Refills: 0 | Status: SHIPPED | OUTPATIENT
Start: 2018-11-10 | End: 2018-12-06 | Stop reason: SDUPTHER

## 2018-09-10 RX ORDER — OXYCODONE AND ACETAMINOPHEN 7.5; 325 MG/1; MG/1
1 TABLET ORAL
Qty: 30 TABLET | Refills: 0 | Status: SHIPPED | OUTPATIENT
Start: 2018-09-10 | End: 2018-12-08 | Stop reason: SDUPTHER

## 2018-09-10 NOTE — PROGRESS NOTES
Subjective     Patient presents today for review of chronic use of narcotic medications for control of pain.  Presently they report adequate control of their pain.  There is no evidence of inappropriate usage.    Review of Systems    Objective   Physical Exam  Vitals:    09/10/18 1355   BP: (!) 130/54   Pulse: (!) 58   Resp: 16     MSE:  Normal mood, normal affect.  No suicidal or homicidal ideation.  No visual or auditory hallucinations.    Assessment   1. DDD (degenerative disc disease), lumbar  POCT BUP Urine Drug Test       Plan   DDD (degenerative disc disease), lumbar  -     POCT BUP Urine Drug Test  WEAN  -     oxyCODONE-acetaminophen (PERCOCET) 7.5-325 mg per tablet; Take 1 tablet by mouth every 24 hours as needed for Pain.  Dispense: 30 tablet; Refill: 0  -     oxyCODONE-acetaminophen (PERCOCET) 7.5-325 mg per tablet; Take 1 tablet by mouth every 24 hours as needed for Pain.  Dispense: 30 tablet; Refill: 0  -     oxyCODONE-acetaminophen (PERCOCET) 7.5-325 mg per tablet; Take 1 tablet by mouth every 24 hours as needed for Pain.  Dispense: 30 tablet; Refill: 0  - Follow-up in about 3 months (around 12/10/2018).

## 2018-10-22 DIAGNOSIS — G47.00 INSOMNIA, UNSPECIFIED TYPE: ICD-10-CM

## 2018-10-22 RX ORDER — TRAZODONE HYDROCHLORIDE 100 MG/1
100 TABLET ORAL NIGHTLY PRN
Qty: 90 TABLET | Refills: 3 | Status: SHIPPED | OUTPATIENT
Start: 2018-10-22 | End: 2019-01-02 | Stop reason: SDUPTHER

## 2018-12-08 RX ORDER — OXYCODONE AND ACETAMINOPHEN 7.5; 325 MG/1; MG/1
1 TABLET ORAL
Qty: 30 TABLET | Refills: 0 | Status: SHIPPED | OUTPATIENT
Start: 2018-12-08 | End: 2019-01-02 | Stop reason: SDUPTHER

## 2018-12-31 PROBLEM — R41.0 DELIRIUM: Status: ACTIVE | Noted: 2018-12-31

## 2018-12-31 PROBLEM — R50.9 FEVER OF UNKNOWN ORIGIN: Status: ACTIVE | Noted: 2018-12-31

## 2019-01-02 ENCOUNTER — LAB VISIT (OUTPATIENT)
Dept: LAB | Facility: HOSPITAL | Age: 84
End: 2019-01-02
Attending: FAMILY MEDICINE
Payer: MEDICARE

## 2019-01-02 ENCOUNTER — OFFICE VISIT (OUTPATIENT)
Dept: FAMILY MEDICINE | Facility: CLINIC | Age: 84
End: 2019-01-02
Payer: MEDICARE

## 2019-01-02 VITALS
HEIGHT: 67 IN | WEIGHT: 145.5 LBS | HEART RATE: 68 BPM | DIASTOLIC BLOOD PRESSURE: 60 MMHG | BODY MASS INDEX: 22.84 KG/M2 | SYSTOLIC BLOOD PRESSURE: 170 MMHG

## 2019-01-02 DIAGNOSIS — M10.9 GOUT, ARTHROPATHY: Primary | ICD-10-CM

## 2019-01-02 DIAGNOSIS — M10.9 GOUT, ARTHROPATHY: ICD-10-CM

## 2019-01-02 DIAGNOSIS — G47.00 INSOMNIA, UNSPECIFIED TYPE: ICD-10-CM

## 2019-01-02 LAB — URATE SERPL-MCNC: 6.8 MG/DL

## 2019-01-02 PROCEDURE — 36415 COLL VENOUS BLD VENIPUNCTURE: CPT | Mod: PO

## 2019-01-02 PROCEDURE — 84550 ASSAY OF BLOOD/URIC ACID: CPT

## 2019-01-02 PROCEDURE — 1101F PR PT FALLS ASSESS DOC 0-1 FALLS W/OUT INJ PAST YR: ICD-10-PCS | Mod: CPTII,S$GLB,, | Performed by: FAMILY MEDICINE

## 2019-01-02 PROCEDURE — 1101F PT FALLS ASSESS-DOCD LE1/YR: CPT | Mod: CPTII,S$GLB,, | Performed by: FAMILY MEDICINE

## 2019-01-02 PROCEDURE — 99214 OFFICE O/P EST MOD 30 MIN: CPT | Mod: S$GLB,,, | Performed by: FAMILY MEDICINE

## 2019-01-02 PROCEDURE — 99214 PR OFFICE/OUTPT VISIT, EST, LEVL IV, 30-39 MIN: ICD-10-PCS | Mod: S$GLB,,, | Performed by: FAMILY MEDICINE

## 2019-01-02 PROCEDURE — 99999 PR PBB SHADOW E&M-EST. PATIENT-LVL III: CPT | Mod: PBBFAC,,, | Performed by: FAMILY MEDICINE

## 2019-01-02 PROCEDURE — 99999 PR PBB SHADOW E&M-EST. PATIENT-LVL III: ICD-10-PCS | Mod: PBBFAC,,, | Performed by: FAMILY MEDICINE

## 2019-01-02 RX ORDER — TRAZODONE HYDROCHLORIDE 100 MG/1
100 TABLET ORAL NIGHTLY PRN
Qty: 90 TABLET | Refills: 3 | Status: SHIPPED | OUTPATIENT
Start: 2019-01-02 | End: 2019-05-19 | Stop reason: SDUPTHER

## 2019-01-02 RX ORDER — OXYCODONE AND ACETAMINOPHEN 7.5; 325 MG/1; MG/1
1 TABLET ORAL
Qty: 30 TABLET | Refills: 0 | Status: SHIPPED | OUTPATIENT
Start: 2019-01-02 | End: 2019-02-13 | Stop reason: SDUPTHER

## 2019-01-02 NOTE — PROGRESS NOTES
Subjective:       Patient ID: Kashmir Parikh is a 86 y.o. male    Chief Complaint: Altered Mental Status and Hand Pain    HPI  3 days ago, patient developed pain and swelling of the left hand consistent with previous episodes of gout.  He reports fever at home of 102.  His symptoms increased and he developed hallucinations with confusion.  He was seen at [facility] ER where evaluation was normal.  He began colchicine and his symptoms are improved.  He still has some swelling, but no pain, limited redness, and no fever.  Hallucinations and confusion have resolved.    Review of Systems     Objective:   Physical Exam   Constitutional: He is oriented to person, place, and time. He appears well-developed and well-nourished. No distress.   Musculoskeletal:   Swelling of the left hand and wrist with slight redness and tenderness   Neurological: He is alert and oriented to person, place, and time.   Vitals reviewed.    Results for orders placed or performed during the hospital encounter of 12/31/18   Blood culture #2, prior to antibiotics   Result Value Ref Range    Blood Culture, Routine No Growth to date     Blood Culture, Routine No Growth to date    Blood culture #1, draw prior to antibiotics   Result Value Ref Range    Blood Culture, Routine No Growth to date     Blood Culture, Routine No Growth to date    Influenza A & B by Molecular   Result Value Ref Range    Influenza A, Molecular Negative Negative    Influenza B, Molecular Negative Negative    Flu A & B Source nasal swab    Comprehensive metabolic panel   Result Value Ref Range    Sodium 137 136 - 145 mmol/L    Potassium 3.7 3.5 - 5.1 mmol/L    Chloride 105 95 - 110 mmol/L    CO2 23 22 - 31 mmol/L    Glucose 115 (H) 70 - 110 mg/dL    BUN, Bld 19 9 - 21 mg/dL    Creatinine 0.78 0.50 - 1.40 mg/dL    Calcium 9.3 8.4 - 10.2 mg/dL    Total Protein 6.6 6.0 - 8.4 g/dL    Albumin 3.7 3.5 - 5.2 g/dL    Total Bilirubin 1.2 0.2 - 1.3 mg/dL    Alkaline Phosphatase 85 38 - 145 U/L     AST 20 17 - 59 U/L    ALT 17 10 - 44 U/L    Anion Gap 9 8 - 16 mmol/L    eGFR if African American >60 >60 mL/min/1.73 m^2    eGFR if non African American >60 >60 mL/min/1.73 m^2   CBC auto differential   Result Value Ref Range    WBC 6.93 3.90 - 12.70 K/uL    RBC 3.89 (L) 4.60 - 6.20 M/uL    Hemoglobin 12.0 (L) 14.0 - 18.0 g/dL    Hematocrit 35.7 (L) 40.0 - 54.0 %    MCV 92 82 - 98 fL    MCH 30.8 27.0 - 31.0 pg    MCHC 33.6 32.0 - 36.0 g/dL    RDW 12.6 11.5 - 14.5 %    Platelets 222 150 - 350 K/uL    MPV 10.0 9.2 - 12.9 fL    Gran # (ANC) 5.2 1.8 - 7.7 K/uL    Lymph # 0.9 (L) 1.0 - 4.8 K/uL    Mono # 0.8 0.3 - 1.0 K/uL    Eos # 0.0 0.0 - 0.5 K/uL    Baso # 0.01 0.00 - 0.20 K/uL    nRBC 0 0 /100 WBC    Gran% 74.9 (H) 38.0 - 73.0 %    Lymph% 13.4 (L) 18.0 - 48.0 %    Mono% 11.3 4.0 - 15.0 %    Eosinophil% 0.3 0.0 - 8.0 %    Basophil% 0.1 0.0 - 1.9 %    Differential Method Automated    Lactic acid #1, plasma   Result Value Ref Range    Lactate (Lactic Acid) 1.1 0.5 - 2.2 mmol/L   Urinalysis, Reflex to Urine Culture Urine, Clean Catch   Result Value Ref Range    Specimen UA Urine, Unspecified     Color, UA Yellow Yellow, Straw, Sienna    Appearance, UA Clear Clear    pH, UA >8.0 (A) 5.0 - 8.0    Specific Gravity, UA 1.020 1.005 - 1.030    Protein, UA Negative Negative    Glucose, UA Negative Negative    Ketones, UA 1+ (A) Negative    Bilirubin (UA) Negative Negative    Occult Blood UA Trace (A) Negative    Nitrite, UA Negative Negative    Urobilinogen, UA 2.0-3.0 (A) <2.0 EU/dL    Leukocytes, UA Negative Negative   Urinalysis Microscopic   Result Value Ref Range    RBC, UA 2 0 - 4 /hpf    Squam Epithel, UA 1 /hpf    Microscopic Comment SEE COMMENT          Assessment:       1. Gout, arthropathy  Uric acid        Plan:       Gout, arthropathy  -     Uric acid; Future; Expected date: 01/02/2019  - Continue colchicine until symptoms resolve.    Follow-up in about 3 months (around 4/2/2019).

## 2019-01-07 RX ORDER — COLCHICINE 0.6 MG/1
TABLET, FILM COATED ORAL
Qty: 90 TABLET | Refills: 0 | Status: SHIPPED | OUTPATIENT
Start: 2019-01-07 | End: 2020-12-17 | Stop reason: SDUPTHER

## 2019-02-13 RX ORDER — OXYCODONE AND ACETAMINOPHEN 7.5; 325 MG/1; MG/1
1 TABLET ORAL
Qty: 30 TABLET | Refills: 0 | Status: SHIPPED | OUTPATIENT
Start: 2019-02-13 | End: 2019-03-18 | Stop reason: SDUPTHER

## 2019-03-14 ENCOUNTER — INITIAL CONSULT (OUTPATIENT)
Dept: RHEUMATOLOGY | Facility: CLINIC | Age: 84
End: 2019-03-14
Payer: MEDICARE

## 2019-03-14 VITALS
WEIGHT: 138 LBS | DIASTOLIC BLOOD PRESSURE: 63 MMHG | HEART RATE: 54 BPM | SYSTOLIC BLOOD PRESSURE: 118 MMHG | BODY MASS INDEX: 21.66 KG/M2 | HEIGHT: 67 IN

## 2019-03-14 DIAGNOSIS — M10.9 GOUT, ARTHRITIS: ICD-10-CM

## 2019-03-14 DIAGNOSIS — J84.10 PULMONARY FIBROSIS: Primary | ICD-10-CM

## 2019-03-14 DIAGNOSIS — R93.89 ABNORMAL FINDINGS ON DIAGNOSTIC IMAGING OF OTHER SPECIFIED BODY STRUCTURES: ICD-10-CM

## 2019-03-14 DIAGNOSIS — M47.817 LUMBAR AND SACRAL ARTHRITIS: ICD-10-CM

## 2019-03-14 PROCEDURE — 99205 PR OFFICE/OUTPT VISIT, NEW, LEVL V, 60-74 MIN: ICD-10-PCS | Mod: HCNC,S$GLB,, | Performed by: INTERNAL MEDICINE

## 2019-03-14 PROCEDURE — 99999 PR PBB SHADOW E&M-EST. PATIENT-LVL III: ICD-10-PCS | Mod: PBBFAC,HCNC,, | Performed by: INTERNAL MEDICINE

## 2019-03-14 PROCEDURE — 1101F PR PT FALLS ASSESS DOC 0-1 FALLS W/OUT INJ PAST YR: ICD-10-PCS | Mod: HCNC,CPTII,S$GLB, | Performed by: INTERNAL MEDICINE

## 2019-03-14 PROCEDURE — 99999 PR PBB SHADOW E&M-EST. PATIENT-LVL III: CPT | Mod: PBBFAC,HCNC,, | Performed by: INTERNAL MEDICINE

## 2019-03-14 PROCEDURE — 99499 UNLISTED E&M SERVICE: CPT | Mod: S$GLB,,, | Performed by: INTERNAL MEDICINE

## 2019-03-14 PROCEDURE — 99205 OFFICE O/P NEW HI 60 MIN: CPT | Mod: HCNC,S$GLB,, | Performed by: INTERNAL MEDICINE

## 2019-03-14 PROCEDURE — 99499 RISK ADDL DX/OHS AUDIT: ICD-10-PCS | Mod: S$GLB,,, | Performed by: INTERNAL MEDICINE

## 2019-03-14 PROCEDURE — 1101F PT FALLS ASSESS-DOCD LE1/YR: CPT | Mod: HCNC,CPTII,S$GLB, | Performed by: INTERNAL MEDICINE

## 2019-03-14 RX ORDER — ALLOPURINOL 100 MG/1
100 TABLET ORAL DAILY
Qty: 30 TABLET | Refills: 6 | Status: SHIPPED | OUTPATIENT
Start: 2019-03-14 | End: 2019-04-13

## 2019-03-14 RX ORDER — PREDNISONE 5 MG/1
5 TABLET ORAL DAILY PRN
Qty: 30 TABLET | Refills: 6 | Status: SHIPPED | OUTPATIENT
Start: 2019-03-14 | End: 2019-04-13

## 2019-03-14 NOTE — LETTER
March 24, 2019      Johny Ortiz MD  [address] [facility] [address] [place] [address]           [facility] - Rheumatology  [address] [facility] [place] [address]  Phone: [phone]  Fax: [phone]          Patient: Kashmir Parikh   MR Number: [id number]   YOB: 1932   Date of Visit: 3/14/2019       Dear Dr. Johny Ortiz:    Thank you for referring Kashmir Parikh to me for evaluation. Attached you will find relevant portions of my assessment and plan of care.    If you have questions, please do not hesitate to call me. I look forward to following Kashmir Parikh along with you.    Sincerely,    Albert Lopez MD    Enclosure  CC:  No Recipients    If you would like to receive this communication electronically, please contact [email or web address] or [phone] to request more information on [employer] Link access.    For providers and/or their staff who would like to refer a patient to [facility], please contact us through our one-stop-shop provider referral line, [facility], at [phone].    If you feel you have received this communication in error or would no longer like to receive these types of communications, please e-mail [email or web address]

## 2019-03-14 NOTE — PROGRESS NOTES
Subjective:       Patient ID: Kashmir Parikh is a 86 y.o. male.    Chief Complaint: Pain and Gout          Gout    The disease course has been worsening.     He complains of pain, stiffness and joint swelling. The symptoms have been worsening. Affected locations include the neck, left hip and right hip. Associated symptoms include fatigue and pain at night. Pertinent negatives include no dysuria, fever, trouble swallowing, myalgias, dry eyes or headaches. He complains of morning stiffness lasting longer than 2 hours after awakening.The neck, right wrist, left hip and right hip presents with Arthralgia.    Past treatments include colchicine.     Review of Systems   Constitutional: Positive for fatigue. Negative for activity change, appetite change, chills, diaphoresis, fever and unexpected weight change.   HENT: Negative for congestion, ear pain, facial swelling, mouth sores, nosebleeds, postnasal drip, rhinorrhea, sinus pressure, sneezing, sore throat, tinnitus, trouble swallowing and voice change.    Eyes: Negative for pain, discharge, redness, itching and visual disturbance.   Respiratory: Negative for apnea, cough, chest tightness, shortness of breath and wheezing.    Cardiovascular: Negative for chest pain, palpitations and leg swelling.   Gastrointestinal: Negative for abdominal pain, constipation, diarrhea, nausea and vomiting.   Endocrine: Negative for cold intolerance, heat intolerance, polydipsia and polyuria.   Genitourinary: Negative for decreased urine volume, difficulty urinating, dysuria, flank pain, frequency, hematuria and urgency.   Musculoskeletal: Positive for back pain, gait problem, joint swelling, neck pain, neck stiffness and stiffness. Negative for arthralgias and myalgias.   Skin: Positive for rash. Negative for pallor and wound.   Allergic/Immunologic: Negative for immunocompromised state.   Neurological: Negative for dizziness, tremors, seizures, syncope, weakness, numbness and  "headaches.   Hematological: Negative for adenopathy. Does not bruise/bleed easily.   Psychiatric/Behavioral: Negative for sleep disturbance and suicidal ideas. The patient is not nervous/anxious.          Objective:   /63   Pulse (!) 54   Ht 5' 7" (1.702 m)   Wt 62.6 kg (138 lb)   BMI 21.61 kg/m²      Physical Exam   Vitals reviewed.  Constitutional: He is oriented to person, place, and time. No distress.   HENT:   Head: Normocephalic and atraumatic.   Mouth/Throat: Oropharynx is clear and moist.   Eyes: EOM are normal. Pupils are equal, round, and reactive to light.   Neck: Neck supple. No thyromegaly present.   Cardiovascular: Normal rate, regular rhythm and normal heart sounds.  Exam reveals no gallop and no friction rub.    No murmur heard.  Pulmonary/Chest: Breath sounds normal. He has no wheezes. He has no rales. He exhibits no tenderness.   Abdominal: There is no tenderness. There is no rebound and no guarding.       Right Side Rheumatological Exam     Examination finds the 2nd MCP, 3rd MCP, 4th MCP and 5th MCP normal.    The patient is tender to palpation of the shoulder and elbow    He has swelling of the knee    The patient has an enlarged wrist, 1st PIP, 2nd PIP, 3rd PIP, 4th PIP and 5th PIP    Shoulder Exam   Tenderness Location: acromioclavicular joint and posterior shoulder    Range of Motion   Active abduction: abnormal   Adduction: abnormal  Sensation: normal    Knee Exam     Range of Motion   Extension: normal   Flexion: normal   Patellofemoral Crepitus: positive  Effusion: positive  Sensation: normal    Hip Exam   Tenderness Location: anterior and posterior    Range of Motion   Extension: abnormal   Flexion: abnormal   Sensation: normal    Elbow/Wrist Exam   Tenderness Location: no tenderness    Range of Motion   Elbow   Flexion: normal   Sensation: normal    Muscle Strength (0-5 scale):  Neck Flexion:  3  Neck Extension: 3  Deltoid:  3  Biceps: 3/5   Triceps:  3  Quadriceps:  3   Distal " Lower Extremity: 3    Left Side Rheumatological Exam     Examination finds the elbow, wrist, 1st MCP, 2nd MCP, 3rd MCP, 4th MCP and 5th MCP normal.    The patient is tender to palpation of the shoulder.    He has swelling of the knee    The patient has an enlarged 1st PIP, 2nd PIP, 3rd PIP, 4th PIP and 5th PIP.    Shoulder Exam   Tenderness Location: acromioclavicular joint and posterior shoulder    Range of Motion   Active abduction: abnormal   Extension: abnormal   Sensation: normal    Knee Exam     Range of Motion   Extension: normal   Flexion: normal     Patellofemoral Crepitus: positive  Effusion: positive  Sensation: normal    Hip Exam   Tenderness Location: anterior and posterior    Range of Motion   Extension: abnormal   Flexion: abnormal   Sensation: normal    Elbow/Wrist Exam     Range of Motion   Elbow   Flexion: normal   Sensation: normal    Muscle Strength (0-5 scale):  Neck Flexion:  3  Neck Extension: 3  Deltoid:  3  Biceps: 3/5   Triceps:  3  Quadriceps:  3   Distal Lower Extremity: 3      Back/Neck Exam   General Inspection   Gait: normal       Tenderness Right paramedian tenderness of the Lower C-Spine, Lower L-Spine, Upper C-Spine, Upper L-Spine and SI Joint.Left paramedian tenderness of the Lower C-Spine, Lower L-Spine, Upper C-Spine, Upper L-Spine and SI Joint.    Back Range of Motion   Extension: abnormal  Flexion: abnormal  Lateral Bend Right: abnormal  Lateral Bend Left: abnormal  Rotation Right: abnormal  Rotation Left: abnormal    Neck Range of Motion   Flexion: Limited and moderate  Extension: Limited and moderate  Right Lateral Bend: abnormal  Left Lateral Bend: abnormal  Right Rotation: abnormal  Left Rotation: abnormal  Lymphadenopathy:     He has no cervical adenopathy.   Neurological: He is alert and oriented to person, place, and time. He displays weakness. He exhibits normal muscle tone.   Reflex Scores:       Patellar reflexes are 2+ on the right side and 2+ on the left  side.  Skin: No rash noted. No erythema. No pallor.     Psychiatric: Mood and affect normal.   Musculoskeletal: He exhibits tenderness and deformity. He exhibits no edema.   Lumbar sacral arthritis         Results for orders placed or performed in visit on 01/02/19   Uric acid   Result Value Ref Range    Uric Acid 6.8 3.4 - 7.0 mg/dL          Assessment:       1. Pulmonary fibrosis    2. Gout, arthritis    3. Lumbar and sacral arthritis    4. Abnormal findings on diagnostic imaging of other specified body structures             Plan:       Kashmir was seen today for pain and gout.    Diagnoses and all orders for this visit:    Pulmonary fibrosis  -     allopurinol (ZYLOPRIM) 100 MG tablet; Take 1 tablet (100 mg total) by mouth once daily. Every day to prevent gout  -     predniSONE (DELTASONE) 5 MG tablet; Take 1 tablet (5 mg total) by mouth daily as needed. Gouty flare  -     CBC auto differential; Future  -     Comprehensive metabolic panel; Future  -     C-reactive protein; Future  -     Rheumatoid factor; Future  -     Sedimentation rate; Future  -     TSH; Future    Gout, arthritis  -     allopurinol (ZYLOPRIM) 100 MG tablet; Take 1 tablet (100 mg total) by mouth once daily. Every day to prevent gout  -     predniSONE (DELTASONE) 5 MG tablet; Take 1 tablet (5 mg total) by mouth daily as needed. Gouty flare  -     CBC auto differential; Future  -     Comprehensive metabolic panel; Future  -     C-reactive protein; Future  -     Rheumatoid factor; Future  -     Sedimentation rate; Future  -     TSH; Future    Lumbar and sacral arthritis  -     allopurinol (ZYLOPRIM) 100 MG tablet; Take 1 tablet (100 mg total) by mouth once daily. Every day to prevent gout  -     predniSONE (DELTASONE) 5 MG tablet; Take 1 tablet (5 mg total) by mouth daily as needed. Gouty flare  -     CBC auto differential; Future  -     Comprehensive metabolic panel; Future  -     C-reactive protein; Future  -     Rheumatoid factor; Future  -      Sedimentation rate; Future  -     TSH; Future    Abnormal findings on diagnostic imaging of other specified body structures   -     TSH; Future       Discussed how to take the medication and info given

## 2019-03-18 RX ORDER — OXYCODONE AND ACETAMINOPHEN 7.5; 325 MG/1; MG/1
1 TABLET ORAL
Qty: 30 TABLET | Refills: 0 | Status: SHIPPED | OUTPATIENT
Start: 2019-03-18 | End: 2019-04-22 | Stop reason: SDUPTHER

## 2019-04-09 RX ORDER — CARVEDILOL 3.12 MG/1
TABLET ORAL
Qty: 180 TABLET | Refills: 4 | Status: SHIPPED | OUTPATIENT
Start: 2019-04-09 | End: 2020-04-30

## 2019-04-10 ENCOUNTER — OFFICE VISIT (OUTPATIENT)
Dept: FAMILY MEDICINE | Facility: CLINIC | Age: 84
End: 2019-04-10
Payer: MEDICARE

## 2019-04-10 ENCOUNTER — LAB VISIT (OUTPATIENT)
Dept: LAB | Facility: HOSPITAL | Age: 84
End: 2019-04-10
Attending: INTERNAL MEDICINE
Payer: MEDICARE

## 2019-04-10 VITALS
HEIGHT: 67 IN | HEART RATE: 74 BPM | SYSTOLIC BLOOD PRESSURE: 122 MMHG | DIASTOLIC BLOOD PRESSURE: 60 MMHG | BODY MASS INDEX: 21.48 KG/M2 | WEIGHT: 136.88 LBS | OXYGEN SATURATION: 97 %

## 2019-04-10 DIAGNOSIS — J34.89 NASAL OBSTRUCTION: ICD-10-CM

## 2019-04-10 DIAGNOSIS — I25.2 HISTORY OF MYOCARDIAL INFARCTION: ICD-10-CM

## 2019-04-10 DIAGNOSIS — J34.3 NASAL TURBINATE HYPERTROPHY: ICD-10-CM

## 2019-04-10 DIAGNOSIS — M10.9 GOUT, ARTHRITIS: ICD-10-CM

## 2019-04-10 DIAGNOSIS — G47.00 INSOMNIA, UNSPECIFIED TYPE: ICD-10-CM

## 2019-04-10 DIAGNOSIS — I65.23 BILATERAL CAROTID ARTERY STENOSIS: ICD-10-CM

## 2019-04-10 DIAGNOSIS — I50.42 CHRONIC COMBINED SYSTOLIC AND DIASTOLIC CONGESTIVE HEART FAILURE: ICD-10-CM

## 2019-04-10 DIAGNOSIS — E78.5 DYSLIPIDEMIA: ICD-10-CM

## 2019-04-10 DIAGNOSIS — F11.20 OPIOID TYPE DEPENDENCE, CONTINUOUS: ICD-10-CM

## 2019-04-10 DIAGNOSIS — J34.2 NASAL SEPTAL DEVIATION: ICD-10-CM

## 2019-04-10 DIAGNOSIS — H91.93 BILATERAL HEARING LOSS, UNSPECIFIED HEARING LOSS TYPE: ICD-10-CM

## 2019-04-10 DIAGNOSIS — I70.0 AORTIC ATHEROSCLEROSIS: ICD-10-CM

## 2019-04-10 DIAGNOSIS — I10 ESSENTIAL HYPERTENSION: ICD-10-CM

## 2019-04-10 DIAGNOSIS — Z95.2 S/P AVR (AORTIC VALVE REPLACEMENT): ICD-10-CM

## 2019-04-10 DIAGNOSIS — T48.5X5A RHINITIS MEDICAMENTOSA: ICD-10-CM

## 2019-04-10 DIAGNOSIS — H33.003 RETINAL DETACHMENT OF BOTH EYES WITH RETINAL BREAK: ICD-10-CM

## 2019-04-10 DIAGNOSIS — Z02.89 PAIN MEDICATION AGREEMENT: ICD-10-CM

## 2019-04-10 DIAGNOSIS — J84.10 PULMONARY FIBROSIS: ICD-10-CM

## 2019-04-10 DIAGNOSIS — N40.0 BENIGN NON-NODULAR PROSTATIC HYPERPLASIA WITHOUT LOWER URINARY TRACT SYMPTOMS: ICD-10-CM

## 2019-04-10 DIAGNOSIS — I25.5 ISCHEMIC CARDIOMYOPATHY: ICD-10-CM

## 2019-04-10 DIAGNOSIS — Z00.00 ENCOUNTER FOR PREVENTIVE HEALTH EXAMINATION: Primary | ICD-10-CM

## 2019-04-10 DIAGNOSIS — M51.36 DDD (DEGENERATIVE DISC DISEASE), LUMBAR: ICD-10-CM

## 2019-04-10 DIAGNOSIS — M47.817 LUMBAR AND SACRAL ARTHRITIS: ICD-10-CM

## 2019-04-10 DIAGNOSIS — H25.13 NUCLEAR SCLEROSIS OF BOTH EYES: ICD-10-CM

## 2019-04-10 DIAGNOSIS — H35.3222 EXUDATIVE AGE-RELATED MACULAR DEGENERATION OF LEFT EYE WITH INACTIVE CHOROIDAL NEOVASCULARIZATION: ICD-10-CM

## 2019-04-10 DIAGNOSIS — I25.10 CORONARY ARTERY DISEASE INVOLVING NATIVE CORONARY ARTERY OF NATIVE HEART WITHOUT ANGINA PECTORIS: ICD-10-CM

## 2019-04-10 DIAGNOSIS — Z95.1 S/P CABG X 1: ICD-10-CM

## 2019-04-10 DIAGNOSIS — J31.0 RHINITIS MEDICAMENTOSA: ICD-10-CM

## 2019-04-10 DIAGNOSIS — R93.89 ABNORMAL FINDINGS ON DIAGNOSTIC IMAGING OF OTHER SPECIFIED BODY STRUCTURES: ICD-10-CM

## 2019-04-10 PROBLEM — R41.0 DELIRIUM: Status: RESOLVED | Noted: 2018-12-31 | Resolved: 2019-04-10

## 2019-04-10 LAB
ALBUMIN SERPL BCP-MCNC: 3.5 G/DL (ref 3.5–5.2)
ALP SERPL-CCNC: 95 U/L (ref 55–135)
ALT SERPL W/O P-5'-P-CCNC: 12 U/L (ref 10–44)
ANION GAP SERPL CALC-SCNC: 8 MMOL/L (ref 8–16)
AST SERPL-CCNC: 17 U/L (ref 10–40)
BASOPHILS # BLD AUTO: 0.02 K/UL (ref 0–0.2)
BASOPHILS NFR BLD: 0.4 % (ref 0–1.9)
BILIRUB SERPL-MCNC: 0.6 MG/DL (ref 0.1–1)
BUN SERPL-MCNC: 19 MG/DL (ref 8–23)
CALCIUM SERPL-MCNC: 9.7 MG/DL (ref 8.7–10.5)
CHLORIDE SERPL-SCNC: 108 MMOL/L (ref 95–110)
CO2 SERPL-SCNC: 25 MMOL/L (ref 23–29)
CREAT SERPL-MCNC: 0.9 MG/DL (ref 0.5–1.4)
CRP SERPL-MCNC: 0.7 MG/L (ref 0–8.2)
DIFFERENTIAL METHOD: ABNORMAL
EOSINOPHIL # BLD AUTO: 0.1 K/UL (ref 0–0.5)
EOSINOPHIL NFR BLD: 1 % (ref 0–8)
ERYTHROCYTE [DISTWIDTH] IN BLOOD BY AUTOMATED COUNT: 13 % (ref 11.5–14.5)
ERYTHROCYTE [SEDIMENTATION RATE] IN BLOOD BY WESTERGREN METHOD: 10 MM/HR (ref 0–10)
EST. GFR  (AFRICAN AMERICAN): >60 ML/MIN/1.73 M^2
EST. GFR  (NON AFRICAN AMERICAN): >60 ML/MIN/1.73 M^2
GLUCOSE SERPL-MCNC: 103 MG/DL (ref 70–110)
HCT VFR BLD AUTO: 37.1 % (ref 40–54)
HGB BLD-MCNC: 12 G/DL (ref 14–18)
IMM GRANULOCYTES # BLD AUTO: 0.02 K/UL (ref 0–0.04)
IMM GRANULOCYTES NFR BLD AUTO: 0.4 % (ref 0–0.5)
LYMPHOCYTES # BLD AUTO: 1.3 K/UL (ref 1–4.8)
LYMPHOCYTES NFR BLD: 23.8 % (ref 18–48)
MCH RBC QN AUTO: 30.8 PG (ref 27–31)
MCHC RBC AUTO-ENTMCNC: 32.3 G/DL (ref 32–36)
MCV RBC AUTO: 95 FL (ref 82–98)
MONOCYTES # BLD AUTO: 0.6 K/UL (ref 0.3–1)
MONOCYTES NFR BLD: 11.8 % (ref 4–15)
NEUTROPHILS # BLD AUTO: 3.3 K/UL (ref 1.8–7.7)
NEUTROPHILS NFR BLD: 62.6 % (ref 38–73)
NRBC BLD-RTO: 0 /100 WBC
PLATELET # BLD AUTO: 239 K/UL (ref 150–350)
PMV BLD AUTO: 10.1 FL (ref 9.2–12.9)
POTASSIUM SERPL-SCNC: 4.2 MMOL/L (ref 3.5–5.1)
PROT SERPL-MCNC: 6.4 G/DL (ref 6–8.4)
RBC # BLD AUTO: 3.89 M/UL (ref 4.6–6.2)
RHEUMATOID FACT SERPL-ACNC: <10 IU/ML (ref 0–15)
SODIUM SERPL-SCNC: 141 MMOL/L (ref 136–145)
TSH SERPL DL<=0.005 MIU/L-ACNC: 2.27 UIU/ML (ref 0.4–4)
WBC # BLD AUTO: 5.26 K/UL (ref 3.9–12.7)

## 2019-04-10 PROCEDURE — 99999 PR PBB SHADOW E&M-EST. PATIENT-LVL V: CPT | Mod: PBBFAC,HCNC,, | Performed by: NURSE PRACTITIONER

## 2019-04-10 PROCEDURE — 80053 COMPREHEN METABOLIC PANEL: CPT | Mod: HCNC

## 2019-04-10 PROCEDURE — G0439 PPPS, SUBSEQ VISIT: HCPCS | Mod: HCNC,S$GLB,, | Performed by: NURSE PRACTITIONER

## 2019-04-10 PROCEDURE — 99499 RISK ADDL DX/OHS AUDIT: ICD-10-PCS | Mod: HCNC,S$GLB,, | Performed by: NURSE PRACTITIONER

## 2019-04-10 PROCEDURE — 85025 COMPLETE CBC W/AUTO DIFF WBC: CPT | Mod: HCNC

## 2019-04-10 PROCEDURE — 99999 PR PBB SHADOW E&M-EST. PATIENT-LVL V: ICD-10-PCS | Mod: PBBFAC,HCNC,, | Performed by: NURSE PRACTITIONER

## 2019-04-10 PROCEDURE — 86140 C-REACTIVE PROTEIN: CPT | Mod: HCNC

## 2019-04-10 PROCEDURE — 99499 UNLISTED E&M SERVICE: CPT | Mod: HCNC,S$GLB,, | Performed by: NURSE PRACTITIONER

## 2019-04-10 PROCEDURE — G0439 PR MEDICARE ANNUAL WELLNESS SUBSEQUENT VISIT: ICD-10-PCS | Mod: HCNC,S$GLB,, | Performed by: NURSE PRACTITIONER

## 2019-04-10 PROCEDURE — 84443 ASSAY THYROID STIM HORMONE: CPT | Mod: HCNC

## 2019-04-10 PROCEDURE — 36415 COLL VENOUS BLD VENIPUNCTURE: CPT | Mod: HCNC,PO

## 2019-04-10 PROCEDURE — 85651 RBC SED RATE NONAUTOMATED: CPT | Mod: HCNC,PO

## 2019-04-10 PROCEDURE — 86431 RHEUMATOID FACTOR QUANT: CPT | Mod: HCNC

## 2019-04-10 NOTE — PATIENT INSTRUCTIONS
Counseling and Referral of Other Preventative  (Italic type indicates deductible and co-insurance are waived)    Patient Name: Kashmir Parikh  Today's Date: 4/10/2019    Health Maintenance       Date Due Completion Date    TETANUS VACCINE 10/18/1950 ---    Urine Drug Screen 09/10/2019 9/10/2018    Aspirin/Antiplatelet Therapy 04/10/2020 4/10/2019    Lipid Panel 03/26/2023 3/26/2018        No orders of the defined types were placed in this encounter.    The following information is provided to all patients.  This information is to help you find resources for any of the problems found today that may be affecting your health:                Living healthy guide: www.[facility].[place].[facility]      Understanding Diabetes: www.diabetes.org      Eating healthy: www.cdc.gov/healthyweight      [facility] home safety checklist: www.cdc.gov/steadi/patient.html      Agency on Aging: www.goea.[place].[facility]      Alcoholics anonymous (AA): www.aa.org      Physical Activity: www.sheila.nih.gov/vt6rhko      Tobacco use: www.quitwithusla.org

## 2019-04-12 ENCOUNTER — TELEPHONE (OUTPATIENT)
Dept: RHEUMATOLOGY | Facility: CLINIC | Age: 84
End: 2019-04-12

## 2019-04-12 NOTE — TELEPHONE ENCOUNTER
Contacted patient and informed of lab results and instructed to keep appointment on 4-23-19 to discuss results in further detail. Patient verbalized understanding.

## 2019-04-12 NOTE — TELEPHONE ENCOUNTER
----- Message from Rashmi Loyd PA-C sent at 4/11/2019  8:51 AM CDT -----  Kidney and liver function tests are normal. Inflammatory markers are normal. Blood counts consistent with mild stable anemia. Keep follow up visit to review results in further details.

## 2019-04-15 RX ORDER — CLOPIDOGREL BISULFATE 75 MG/1
TABLET ORAL
Qty: 90 TABLET | Refills: 4 | Status: SHIPPED | OUTPATIENT
Start: 2019-04-15 | End: 2020-04-30

## 2019-04-18 PROBLEM — R50.9 FEVER OF UNKNOWN ORIGIN: Status: RESOLVED | Noted: 2018-12-31 | Resolved: 2019-04-18

## 2019-04-18 NOTE — PROGRESS NOTES
"Kashmir Parikh presented for a  Medicare AWV and comprehensive Health Risk Assessment today. The following components were reviewed and updated:    · Medical history  · Family History  · Social history  · Allergies and Current Medications  · Health Risk Assessment  · Health Maintenance  · Care Team     ** See Completed Assessments for Annual Wellness Visit within the encounter summary.**       The following assessments were completed:  · Living Situation  · CAGE  · Depression Screening  · Timed Get Up and Go  · Whisper Test  · Cognitive Function Screening          · Nutrition Screening  · ADL Screening  · PAQ Screening    Vitals:    04/10/19 1458   BP: 122/60   BP Location: Left arm   Patient Position: Sitting   BP Method: Medium (Manual)   Pulse: 74   SpO2: 97%   Weight: 62.1 kg (136 lb 14.5 oz)   Height: 5' 7" (1.702 m)     Body mass index is 21.44 kg/m².  Physical Exam   Constitutional: He is oriented to person, place, and time. He appears well-developed and well-nourished. He is active and cooperative.   HENT:   Head: Normocephalic and atraumatic.   Right Ear: Decreased hearing is noted.   Left Ear: Decreased hearing is noted.   Eyes: No scleral icterus.   Cardiovascular: Normal rate and regular rhythm.   Murmur heard.  Pulmonary/Chest: Effort normal and breath sounds normal. He has no wheezes. He has no rales.   Neurological: He is alert and oriented to person, place, and time. No cranial nerve deficit.   Skin: Skin is warm and dry. No rash noted.   Vitals reviewed.        Diagnoses and health risks identified today and associated recommendations/orders:    1. Encounter for preventive health examination  Reviewed health maintenance and provided recommendations    Encourage weekly weighs, increase daily caloric intake    2. DDD (degenerative disc disease), lumbar  Continue to monitor  Followed by Johny Ortiz MD .      3. S/P AVR (aortic valve replacement)  Continue to monitor  Followed by Mandy.      4. S/P " CABG x 1  Continue to monitor  Followed by Mandy.      5. Coronary artery disease involving native coronary artery of native heart without angina pectoris  Continue to monitor  Followed by Mandy  No CP.      6. Chronic combined systolic and diastolic congestive heart failure  Continue to monitor  Followed by Mandy.      7. Dyslipidemia  Continue to monitor  Followed by Johny Ortiz MD .      8. Essential hypertension  Continue to monitor  Followed by Johny Ortiz MD .      9. History of myocardial infarction  Continue to monitor  Followed by Mandy.      10. Bilateral carotid artery stenosis  Continue to monitor  Followed by Mandy.      11. Aortic atherosclerosis  Continue to monitor  Followed by Mandy.      12. Ischemic cardiomyopathy  Continue to monitor  Followed by Mandy.      13. Benign non-nodular prostatic hyperplasia without lower urinary tract symptoms  Continue to monitor  Followed by Johny Ortiz MD .      14. Gout, arthritis  Continue to monitor  Followed by Johny Ortiz MD .      15. Insomnia, unspecified type  Continue to monitor  Followed by Johny Ortiz MD .      16. Pain medication agreement  Continue to monitor  Followed by Johny Ortiz MD .      17. Opioid type dependence, continuous  Continue to monitor  Followed by Johny Ortiz MD .      18. Retinal detachment of both eyes with retinal break  Continue to monitor  Followed by Mo.      19. Exudative age-related macular degeneration of left eye with inactive choroidal neovascularization  Continue to monitor  Followed by Mo.      20. Nuclear sclerosis of both eyes  Continue to monitor  Followed by Mo.      21. Bilateral hearing loss, unspecified hearing loss type  Continue to monitor  Followed by Johny Ortiz MD .      22. Nasal obstruction  Continue to monitor  Followed by Johny Ortiz MD .      23. Nasal septal deviation  Continue to monitor  Followed by Johny Ortiz MD .      24. Nasal  turbinate hypertrophy  Continue to monitor  Followed by Johny Ortiz MD .      25. Rhinitis medicamentosa  Continue to monitor  Followed by Johny Ortiz MD .        Provided Kashmir with a 5-10 year written screening schedule and personal prevention plan. Recommendations were developed using the USPSTF age appropriate recommendations. Education, counseling, and referrals were provided as needed. After Visit Summary printed and given to patient which includes a list of additional screenings\tests needed.    Follow up in about 1 year (around 4/10/2020).    Estela Sanchez NP

## 2019-04-22 ENCOUNTER — OFFICE VISIT (OUTPATIENT)
Dept: FAMILY MEDICINE | Facility: CLINIC | Age: 84
End: 2019-04-22
Payer: MEDICARE

## 2019-04-22 VITALS
SYSTOLIC BLOOD PRESSURE: 154 MMHG | BODY MASS INDEX: 21.55 KG/M2 | HEART RATE: 70 BPM | DIASTOLIC BLOOD PRESSURE: 60 MMHG | WEIGHT: 137.56 LBS | OXYGEN SATURATION: 98 %

## 2019-04-22 DIAGNOSIS — M51.36 DDD (DEGENERATIVE DISC DISEASE), LUMBAR: Primary | ICD-10-CM

## 2019-04-22 PROCEDURE — 99999 PR PBB SHADOW E&M-EST. PATIENT-LVL III: ICD-10-PCS | Mod: PBBFAC,HCNC,, | Performed by: FAMILY MEDICINE

## 2019-04-22 PROCEDURE — 1101F PR PT FALLS ASSESS DOC 0-1 FALLS W/OUT INJ PAST YR: ICD-10-PCS | Mod: HCNC,CPTII,S$GLB, | Performed by: FAMILY MEDICINE

## 2019-04-22 PROCEDURE — 99213 OFFICE O/P EST LOW 20 MIN: CPT | Mod: HCNC,S$GLB,, | Performed by: FAMILY MEDICINE

## 2019-04-22 PROCEDURE — 1101F PT FALLS ASSESS-DOCD LE1/YR: CPT | Mod: HCNC,CPTII,S$GLB, | Performed by: FAMILY MEDICINE

## 2019-04-22 PROCEDURE — 99213 PR OFFICE/OUTPT VISIT, EST, LEVL III, 20-29 MIN: ICD-10-PCS | Mod: HCNC,S$GLB,, | Performed by: FAMILY MEDICINE

## 2019-04-22 PROCEDURE — 99999 PR PBB SHADOW E&M-EST. PATIENT-LVL III: CPT | Mod: PBBFAC,HCNC,, | Performed by: FAMILY MEDICINE

## 2019-04-22 RX ORDER — OXYCODONE AND ACETAMINOPHEN 7.5; 325 MG/1; MG/1
1 TABLET ORAL
Qty: 30 TABLET | Refills: 0 | Status: SHIPPED | OUTPATIENT
Start: 2019-04-22 | End: 2019-07-03 | Stop reason: SDUPTHER

## 2019-04-22 RX ORDER — AZELASTINE 1 MG/ML
1 SPRAY, METERED NASAL 2 TIMES DAILY
Qty: 90 ML | Refills: 3 | Status: SHIPPED | OUTPATIENT
Start: 2019-04-22 | End: 2021-03-01

## 2019-04-22 NOTE — PROGRESS NOTES
Subjective     Patient presents today for review of chronic use of narcotic medications for control of pain.  Presently they report adequate control of their pain.  There is no evidence of inappropriate usage.      Review of Systems    Objective   Physical Exam  Vitals:    04/22/19 1358   BP: (!) 154/60   Pulse: 70     MSE:  Normal mood, normal affect.  No suicidal or homicidal ideation.  No visual or auditory hallucinations.    Assessment   1. DDD (degenerative disc disease), lumbar         Plan   DDD (degenerative disc disease), lumbar  - Continue current therapy  - Follow up in about 3 months (around 7/22/2019).    Other orders  -     oxyCODONE-acetaminophen (PERCOCET) 7.5-325 mg per tablet; Take 1 tablet by mouth every 24 hours as needed for Pain.  Dispense: 30 tablet; Refill: 0

## 2019-05-16 DIAGNOSIS — G47.00 INSOMNIA, UNSPECIFIED TYPE: ICD-10-CM

## 2019-05-19 RX ORDER — TRAZODONE HYDROCHLORIDE 100 MG/1
TABLET ORAL
Qty: 90 TABLET | Refills: 3 | Status: SHIPPED | OUTPATIENT
Start: 2019-05-19 | End: 2021-02-02

## 2019-05-21 RX ORDER — ATORVASTATIN CALCIUM 40 MG/1
TABLET, FILM COATED ORAL
Qty: 90 TABLET | Refills: 4 | Status: SHIPPED | OUTPATIENT
Start: 2019-05-21 | End: 2020-06-15

## 2019-05-31 RX ORDER — BENAZEPRIL HYDROCHLORIDE 5 MG/1
TABLET ORAL
Qty: 90 TABLET | Refills: 4 | Status: SHIPPED | OUTPATIENT
Start: 2019-05-31 | End: 2020-09-11

## 2019-06-12 RX ORDER — TAMSULOSIN HYDROCHLORIDE 0.4 MG/1
CAPSULE ORAL
Qty: 90 CAPSULE | Refills: 3 | Status: SHIPPED | OUTPATIENT
Start: 2019-06-12 | End: 2020-03-23

## 2019-07-08 RX ORDER — OXYCODONE AND ACETAMINOPHEN 7.5; 325 MG/1; MG/1
TABLET ORAL
Qty: 30 TABLET | Refills: 0 | Status: SHIPPED | OUTPATIENT
Start: 2019-07-08 | End: 2019-08-13 | Stop reason: SDUPTHER

## 2019-07-24 ENCOUNTER — OFFICE VISIT (OUTPATIENT)
Dept: FAMILY MEDICINE | Facility: CLINIC | Age: 84
End: 2019-07-24
Payer: MEDICARE

## 2019-07-24 VITALS
HEIGHT: 67 IN | WEIGHT: 139.56 LBS | BODY MASS INDEX: 21.9 KG/M2 | HEART RATE: 54 BPM | SYSTOLIC BLOOD PRESSURE: 122 MMHG | OXYGEN SATURATION: 98 % | DIASTOLIC BLOOD PRESSURE: 66 MMHG

## 2019-07-24 DIAGNOSIS — M51.36 DDD (DEGENERATIVE DISC DISEASE), LUMBAR: Primary | ICD-10-CM

## 2019-07-24 DIAGNOSIS — E78.5 HYPERLIPIDEMIA, UNSPECIFIED HYPERLIPIDEMIA TYPE: ICD-10-CM

## 2019-07-24 PROCEDURE — 99999 PR PBB SHADOW E&M-EST. PATIENT-LVL III: CPT | Mod: PBBFAC,HCNC,, | Performed by: FAMILY MEDICINE

## 2019-07-24 PROCEDURE — 1101F PR PT FALLS ASSESS DOC 0-1 FALLS W/OUT INJ PAST YR: ICD-10-PCS | Mod: HCNC,CPTII,S$GLB, | Performed by: FAMILY MEDICINE

## 2019-07-24 PROCEDURE — 99213 OFFICE O/P EST LOW 20 MIN: CPT | Mod: HCNC,S$GLB,, | Performed by: FAMILY MEDICINE

## 2019-07-24 PROCEDURE — 1101F PT FALLS ASSESS-DOCD LE1/YR: CPT | Mod: HCNC,CPTII,S$GLB, | Performed by: FAMILY MEDICINE

## 2019-07-24 PROCEDURE — 99213 PR OFFICE/OUTPT VISIT, EST, LEVL III, 20-29 MIN: ICD-10-PCS | Mod: HCNC,S$GLB,, | Performed by: FAMILY MEDICINE

## 2019-07-24 PROCEDURE — 99999 PR PBB SHADOW E&M-EST. PATIENT-LVL III: ICD-10-PCS | Mod: PBBFAC,HCNC,, | Performed by: FAMILY MEDICINE

## 2019-07-24 NOTE — PROGRESS NOTES
Subjective     Patient presents today for review of chronic use of narcotic medications for control of pain.  Presently they report adequate control of their pain.  There is no evidence of inappropriate usage.    Review of Systems    Objective   Physical Exam  Vitals:    07/24/19 1020   BP: 122/66   Pulse: (!) 54     MSE:  Normal mood, normal affect.  No suicidal or homicidal ideation.  No visual or auditory hallucinations.    Assessment   1. DDD (degenerative disc disease), lumbar     2. Hyperlipidemia, unspecified hyperlipidemia type  Lipid panel       Plan   DDD (degenerative disc disease), lumbar  - Continue current therapy  - Follow up in about 3 months (around 10/24/2019).    Hyperlipidemia, unspecified hyperlipidemia type  -     Lipid panel; Future; Expected date: 07/24/2019

## 2019-08-01 ENCOUNTER — OFFICE VISIT (OUTPATIENT)
Dept: CARDIOLOGY | Facility: CLINIC | Age: 84
End: 2019-08-01
Payer: MEDICARE

## 2019-08-01 VITALS
DIASTOLIC BLOOD PRESSURE: 60 MMHG | HEART RATE: 65 BPM | BODY MASS INDEX: 21.66 KG/M2 | WEIGHT: 138 LBS | SYSTOLIC BLOOD PRESSURE: 144 MMHG | HEIGHT: 67 IN

## 2019-08-01 DIAGNOSIS — I25.10 CORONARY ARTERY DISEASE INVOLVING NATIVE CORONARY ARTERY OF NATIVE HEART WITHOUT ANGINA PECTORIS: ICD-10-CM

## 2019-08-01 DIAGNOSIS — Z95.1 S/P CABG X 1: ICD-10-CM

## 2019-08-01 DIAGNOSIS — Z95.2 S/P AVR (AORTIC VALVE REPLACEMENT): Primary | ICD-10-CM

## 2019-08-01 DIAGNOSIS — I25.5 CARDIOMYOPATHY, ISCHEMIC: Chronic | ICD-10-CM

## 2019-08-01 DIAGNOSIS — I65.23 BILATERAL CAROTID ARTERY STENOSIS: ICD-10-CM

## 2019-08-01 PROCEDURE — 99214 OFFICE O/P EST MOD 30 MIN: CPT | Mod: HCNC,S$GLB,, | Performed by: INTERNAL MEDICINE

## 2019-08-01 PROCEDURE — 99999 PR PBB SHADOW E&M-EST. PATIENT-LVL III: ICD-10-PCS | Mod: PBBFAC,HCNC,, | Performed by: INTERNAL MEDICINE

## 2019-08-01 PROCEDURE — 1101F PT FALLS ASSESS-DOCD LE1/YR: CPT | Mod: HCNC,CPTII,S$GLB, | Performed by: INTERNAL MEDICINE

## 2019-08-01 PROCEDURE — 99214 PR OFFICE/OUTPT VISIT, EST, LEVL IV, 30-39 MIN: ICD-10-PCS | Mod: HCNC,S$GLB,, | Performed by: INTERNAL MEDICINE

## 2019-08-01 PROCEDURE — 99999 PR PBB SHADOW E&M-EST. PATIENT-LVL III: CPT | Mod: PBBFAC,HCNC,, | Performed by: INTERNAL MEDICINE

## 2019-08-01 PROCEDURE — 1101F PR PT FALLS ASSESS DOC 0-1 FALLS W/OUT INJ PAST YR: ICD-10-PCS | Mod: HCNC,CPTII,S$GLB, | Performed by: INTERNAL MEDICINE

## 2019-08-01 NOTE — PROGRESS NOTES
Subjective:    Patient ID:  Kashmir Parikh is a 86 y.o. male who presents for follow-up of No chief complaint on file.      HPI  Here for f/u of AVR/CABG (2012). Patients states is doing well no chest pain, SOB or change in exertional tolerence. Patient does not exercise but remains very active with out change in exertional tolerance or chest pain.     Review of Systems   Constitution: Negative for malaise/fatigue.   Eyes: Negative for blurred vision.   Cardiovascular: Negative for chest pain, claudication, cyanosis, dyspnea on exertion, irregular heartbeat, leg swelling, near-syncope, orthopnea, palpitations, paroxysmal nocturnal dyspnea and syncope.   Respiratory: Negative for cough and shortness of breath.    Hematologic/Lymphatic: Does not bruise/bleed easily.   Musculoskeletal: Negative for back pain, falls, joint pain, muscle cramps, muscle weakness and myalgias.   Gastrointestinal: Negative for abdominal pain, change in bowel habit, nausea and vomiting.   Genitourinary: Negative for urgency.   Neurological: Negative for dizziness, focal weakness and light-headedness.       Past Medical History:   Diagnosis Date    Bilateral carotid artery disease 11/18/2016    BPH (benign prostatic hypertrophy)     CAD (coronary artery disease) 1/8/2013 4/2016 failed  of LAD 12/12 LAD-35%, OM 75%, small RCA    Cardiomyopathy, ischemic 10/20/2017    35%    Cataract     OU    CHF (congestive heart failure) 3/9/2016    Coronary artery disease     Diverticulitis     Gout, arthritis     HEARING LOSS     Heart murmur     asymptomatic    HTN (hypertension)     Pt states resolved    Hyperlipidemia     Myocardial infarction     Recurrent nephrolithiasis     S/P AVR (aortic valve replacement) 1/8/2013    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 He appears well-developed and well-nourished. He is cooperative.   HENT:   Head: Normocephalic and atraumatic.   Eyes: Conjunctivae are normal. Right eye exhibits no exudate. Left eye exhibits no exudate.   Neck: Normal range of motion. Neck supple. Normal carotid pulses and no JVD present. Carotid bruit is not present. No thyromegaly present.   Cardiovascular: Normal rate, regular rhythm, normal heart sounds and intact distal pulses.      Harsh midsystolic murmur is present at the upper right sternal border radiating to the neck.  Pulses:       Carotid pulses are 2+ on the right side, and 2+ on the left side.       Radial pulses are 2+ on the right side, and 2+ on the left side.        Dorsalis pedis pulses are 2+ on the right side, and 2+ on the left side.        Posterior tibial pulses are 2+ on the right side, and 2+ on the left side.   Well healed midline sternal incision.     Pulmonary/Chest: Effort normal and breath sounds normal.   Abdominal: Soft. Bowel sounds are normal.   Musculoskeletal: Normal range of motion. He exhibits no edema.   Neurological: He is alert and oriented to person, place, and time. Gait normal.   Skin: Skin is warm, dry and intact. No cyanosis. Nails show no clubbing.   Psychiatric: He has a normal mood and affect. His speech is normal and behavior is normal. Judgment and thought content normal.   Nursing note and vitals reviewed.            ..    Chemistry        Component Value Date/Time     04/10/2019 1447    K 4.2 04/10/2019 1447     04/10/2019 1447    CO2 25 04/10/2019 1447    BUN 19 04/10/2019 1447    CREATININE 0.9 04/10/2019 1447     04/10/2019 1447        Component Value Date/Time    CALCIUM 9.7 04/10/2019 1447    ALKPHOS 95 04/10/2019 1447    AST 17 04/10/2019 1447    AST 28 03/08/2016 2105    ALT 12 04/10/2019 1447    BILITOT 0.6 04/10/2019 1447    ESTGFRAFRICA >60.0 04/10/2019 1447    EGFRNONAA >60.0 04/10/2019 1447            ..  Lab  Results   Component Value Date    CHOL 137 03/26/2018    CHOL 120 05/24/2017    CHOL 154 08/18/2015     Lab Results   Component Value Date    HDL 37 (L) 03/26/2018    HDL 35 (L) 05/24/2017    HDL 37 (L) 08/18/2015     Lab Results   Component Value Date    LDLCALC 75.4 03/26/2018    LDLCALC 61.4 (L) 05/24/2017    LDLCALC 89.0 08/18/2015     Lab Results   Component Value Date    TRIG 123 03/26/2018    TRIG 118 05/24/2017    TRIG 140 08/18/2015     Lab Results   Component Value Date    CHOLHDL 27.0 03/26/2018    CHOLHDL 29.2 05/24/2017    CHOLHDL 24.0 08/18/2015     ..  Lab Results   Component Value Date    WBC 5.26 04/10/2019    HGB 12.0 (L) 04/10/2019    HCT 37.1 (L) 04/10/2019    MCV 95 04/10/2019     04/10/2019       Test(s) Reviewed  I have reviewed the following in detail:  [] Stress test   [] Angiography   [x] Echocardiogram   [x] Labs   [x] Other:       Assessment:         ICD-10-CM ICD-9-CM   1. S/P AVR (aortic valve replacement) Z95.2 V43.3   2. S/P CABG x 1 Z95.1 V45.81   3. Coronary artery disease involving native coronary artery of native heart without angina pectoris I25.10 414.01   4. Bilateral carotid artery stenosis I65.23 433.10     433.30   5. Cardiomyopathy, ischemic I25.5 414.8     Problem List Items Addressed This Visit     S/P CABG x 1    Overview     12/12 VG-OM         S/P AVR (aortic valve replacement) - Primary    Overview     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 mosaic         Cardiomyopathy, ischemic (Chronic)    Overview     35%         CAD (coronary artery disease)    Overview     4/2016 failed  of LAD  12/12 LAD-35%, OM 75%, small RCA         Bilateral carotid artery disease           Plan:           Return to clinic 9 months   Low level/low impact aerobic exercise 5x's/wk. Heart healthy diet and risk factor modification.    See labs and med orders.  Cfd/lipid next visit      Portions of this note may have been created with voice recognition software.  Grammatical, syntax and spelling errors may  be inevitable.

## 2019-08-13 RX ORDER — OXYCODONE AND ACETAMINOPHEN 7.5; 325 MG/1; MG/1
1 TABLET ORAL
Qty: 30 TABLET | Refills: 0 | Status: SHIPPED | OUTPATIENT
Start: 2019-08-13 | End: 2019-09-25 | Stop reason: SDUPTHER

## 2019-09-25 RX ORDER — OXYCODONE AND ACETAMINOPHEN 7.5; 325 MG/1; MG/1
TABLET ORAL
Qty: 30 TABLET | Refills: 0 | Status: SHIPPED | OUTPATIENT
Start: 2019-09-25 | End: 2019-10-30 | Stop reason: SDUPTHER

## 2019-10-21 ENCOUNTER — OFFICE VISIT (OUTPATIENT)
Dept: FAMILY MEDICINE | Facility: CLINIC | Age: 84
End: 2019-10-21
Payer: MEDICARE

## 2019-10-21 VITALS
BODY MASS INDEX: 21.73 KG/M2 | SYSTOLIC BLOOD PRESSURE: 126 MMHG | WEIGHT: 138.44 LBS | TEMPERATURE: 98 F | HEIGHT: 67 IN | OXYGEN SATURATION: 98 % | HEART RATE: 71 BPM | DIASTOLIC BLOOD PRESSURE: 52 MMHG

## 2019-10-21 DIAGNOSIS — G31.84 MILD COGNITIVE IMPAIRMENT: ICD-10-CM

## 2019-10-21 DIAGNOSIS — M51.36 DDD (DEGENERATIVE DISC DISEASE), LUMBAR: Primary | ICD-10-CM

## 2019-10-21 DIAGNOSIS — J34.89 NASAL LESION: ICD-10-CM

## 2019-10-21 PROCEDURE — 99999 PR PBB SHADOW E&M-EST. PATIENT-LVL III: ICD-10-PCS | Mod: PBBFAC,HCNC,, | Performed by: FAMILY MEDICINE

## 2019-10-21 PROCEDURE — 99999 PR PBB SHADOW E&M-EST. PATIENT-LVL III: CPT | Mod: PBBFAC,HCNC,, | Performed by: FAMILY MEDICINE

## 2019-10-21 PROCEDURE — 99213 OFFICE O/P EST LOW 20 MIN: CPT | Mod: HCNC,S$GLB,, | Performed by: FAMILY MEDICINE

## 2019-10-21 PROCEDURE — 1101F PT FALLS ASSESS-DOCD LE1/YR: CPT | Mod: HCNC,CPTII,S$GLB, | Performed by: FAMILY MEDICINE

## 2019-10-21 PROCEDURE — 99213 PR OFFICE/OUTPT VISIT, EST, LEVL III, 20-29 MIN: ICD-10-PCS | Mod: HCNC,S$GLB,, | Performed by: FAMILY MEDICINE

## 2019-10-21 PROCEDURE — 1101F PR PT FALLS ASSESS DOC 0-1 FALLS W/OUT INJ PAST YR: ICD-10-PCS | Mod: HCNC,CPTII,S$GLB, | Performed by: FAMILY MEDICINE

## 2019-10-21 RX ORDER — PREDNISONE 5 MG/1
TABLET ORAL
Refills: 6 | COMMUNITY
Start: 2019-09-28 | End: 2020-05-14

## 2019-10-21 RX ORDER — ALLOPURINOL 100 MG/1
TABLET ORAL
Refills: 6 | COMMUNITY
Start: 2019-09-28 | End: 2020-04-08

## 2019-10-21 RX ORDER — DONEPEZIL HYDROCHLORIDE 10 MG/1
10 TABLET, FILM COATED ORAL NIGHTLY
Qty: 30 TABLET | Refills: 11 | Status: SHIPPED | OUTPATIENT
Start: 2019-10-21 | End: 2020-11-19

## 2019-10-21 NOTE — PROGRESS NOTES
Subjective     Patient presents today for review of chronic use of narcotic medications for control of pain.  Presently they report adequate control of their pain.  There is no evidence of inappropriate usage.      Review of Systems   Psychiatric/Behavioral: Positive for confusion (some memory issues).       Objective   Physical Exam  Vitals:    10/21/19 1332   BP: (!) 126/52   Pulse: 71   Temp: 97.7 °F (36.5 °C)     MSE:  Normal mood, normal affect.  No suicidal or homicidal ideation.  No visual or auditory hallucinations.    Assessment   1. DDD (degenerative disc disease), lumbar  TOXICOLOGY SCREEN, URINE, RANDOM (COMPLIANCE)   2. Mild cognitive impairment  donepezil (ARICEPT) 10 MG tablet       Plan   DDD (degenerative disc disease), lumbar  -     TOXICOLOGY SCREEN, URINE, RANDOM (COMPLIANCE)  - Continue current therapy  - Follow up in about 3 months (around 1/21/2020).    Mild cognitive impairment  -     ADD donepezil (ARICEPT) 10 MG tablet; Take 1 tablet (10 mg total) by mouth every evening.  Dispense: 30 tablet; Refill: 11    Addendum:  Small lesion at nasal bridge, continues to bleed, will refer to Dermatology

## 2019-10-30 RX ORDER — OXYCODONE AND ACETAMINOPHEN 7.5; 325 MG/1; MG/1
TABLET ORAL
Qty: 30 TABLET | Refills: 0 | Status: SHIPPED | OUTPATIENT
Start: 2019-10-30 | End: 2019-12-02 | Stop reason: SDUPTHER

## 2019-12-02 RX ORDER — OXYCODONE AND ACETAMINOPHEN 7.5; 325 MG/1; MG/1
TABLET ORAL
Qty: 30 TABLET | Refills: 0 | Status: SHIPPED | OUTPATIENT
Start: 2019-12-02 | End: 2019-12-27

## 2019-12-02 NOTE — TELEPHONE ENCOUNTER
Refill Routing Note    Medication(s) are appropriate for refill Outside of protocol      Requested Prescriptions   Pending Prescriptions Disp Refills    oxyCODONE-acetaminophen (PERCOCET) 7.5-325 mg per tablet [Pharmacy Med Name: OXYCODON-ACETAMINOPHEN 7.5- 7.5-325 TAB] 30 tablet 0     Sig: TAKE 1 TABLET BY MOUTH EVERY 24 HOURS AS NEEDED FOR PAIN.       Narcotics Refill Protocol Passed - 12/2/2019  1:34 PM        Passed - Patient seen within 3 months     Last visit with Johny Ortiz MD: 10/21/2019  Last visit in [facility]: 10/21/2019    Patient's next visit in [facility]: 1/27/2020           Passed - Med not refilled within 4 weeks

## 2019-12-27 RX ORDER — OXYCODONE AND ACETAMINOPHEN 7.5; 325 MG/1; MG/1
TABLET ORAL
Qty: 30 TABLET | Refills: 0 | Status: SHIPPED | OUTPATIENT
Start: 2019-12-27 | End: 2020-02-05

## 2020-01-14 ENCOUNTER — TELEPHONE (OUTPATIENT)
Dept: FAMILY MEDICINE | Facility: CLINIC | Age: 85
End: 2020-01-14

## 2020-01-14 NOTE — TELEPHONE ENCOUNTER
----- Message from Miguelina Mcknight sent at 1/14/2020  4:18 PM CST -----  Contact: Pascual Parikh (Son)  Type:  Patient Returning Call    Who Called:  Pascual Parikh (Son)  Who Left Message for Patient:  Onelia  Does the patient know what this is regarding?:  Yes  Best Call Back Number:    Additional Information:  Call to pod, no answer. Calling to speak to the nurse to reschedule appt.

## 2020-01-14 NOTE — TELEPHONE ENCOUNTER
Spoke to pt's son and advised that pt's appt has been rescheduled. Pt's son verbalized understanding.

## 2020-01-14 NOTE — TELEPHONE ENCOUNTER
----- Message from Casie Snow sent at 1/14/2020 12:13 PM CST -----  Contact: Son, Pascual  Placed call to pod, Patient miss call from office please call back at [phone] (home)     Case number  [id number]

## 2020-01-14 NOTE — TELEPHONE ENCOUNTER
----- Message from Marlon Rodriguez sent at 1/14/2020  2:56 PM CST -----  Type: Needs Medical Advice    Who Called:  Son-Pascual  Symptoms (please be specific):  N A  How long has patient had these symptoms:  Pharmacy name and phone #:    Best Call Back Number: [phone]   Additional Information: Patient's son called asking for an update regarding an appointment for the patient.

## 2020-02-04 NOTE — PROGRESS NOTES
Refill Routing Note     Medication(s) are not appropriate for processing by [facility]:    Medication Outside of Protocol    Appointments  past 12m or future 3m with PCP    Date Provider   Last Visit   10/21/2019 Johny Ortiz MD   Next Visit   2/14/2020 Johny Ortiz MD           Automatic Epic Protocol Generated Data:    Requested Prescriptions   Pending Prescriptions Disp Refills    oxyCODONE-acetaminophen (PERCOCET) 7.5-325 mg per tablet [Pharmacy Med Name: OXYCODON-ACETAMINOPHEN 7.5- 7.5-325 TAB] 30 tablet 0     Sig: TAKE 1 TABLET BY MOUTH EVERY 24 HOURS AS NEEDED FOR PAIN       Narcotics Refill Protocol Failed - 2/4/2020 12:26 PM        Failed - Patient seen within 3 months     Last visit with Johny Ortiz MD: 10/21/2019  Last visit in [facility]: 10/21/2019    Patient's next visit in [facility]: 2/14/2020           Passed - Med not refilled within 4 weeks           Note composed: 02/04/2020

## 2020-02-05 RX ORDER — OXYCODONE AND ACETAMINOPHEN 7.5; 325 MG/1; MG/1
TABLET ORAL
Qty: 30 TABLET | Refills: 0 | Status: SHIPPED | OUTPATIENT
Start: 2020-02-05 | End: 2020-03-11

## 2020-02-14 ENCOUNTER — OFFICE VISIT (OUTPATIENT)
Dept: FAMILY MEDICINE | Facility: CLINIC | Age: 85
End: 2020-02-14
Payer: MEDICARE

## 2020-02-14 VITALS
DIASTOLIC BLOOD PRESSURE: 58 MMHG | BODY MASS INDEX: 20.87 KG/M2 | HEART RATE: 74 BPM | OXYGEN SATURATION: 97 % | SYSTOLIC BLOOD PRESSURE: 102 MMHG | HEIGHT: 67 IN | WEIGHT: 132.94 LBS

## 2020-02-14 DIAGNOSIS — F11.20 OPIOID TYPE DEPENDENCE, CONTINUOUS: ICD-10-CM

## 2020-02-14 DIAGNOSIS — R53.83 FATIGUE, UNSPECIFIED TYPE: ICD-10-CM

## 2020-02-14 DIAGNOSIS — H35.3222 EXUDATIVE AGE-RELATED MACULAR DEGENERATION OF LEFT EYE WITH INACTIVE CHOROIDAL NEOVASCULARIZATION: ICD-10-CM

## 2020-02-14 DIAGNOSIS — M51.36 DDD (DEGENERATIVE DISC DISEASE), LUMBAR: Primary | ICD-10-CM

## 2020-02-14 DIAGNOSIS — I50.42 CHRONIC COMBINED SYSTOLIC AND DIASTOLIC CONGESTIVE HEART FAILURE: ICD-10-CM

## 2020-02-14 DIAGNOSIS — I70.0 AORTIC ATHEROSCLEROSIS: ICD-10-CM

## 2020-02-14 PROCEDURE — 99499 RISK ADDL DX/OHS AUDIT: ICD-10-PCS | Mod: S$GLB,,, | Performed by: FAMILY MEDICINE

## 2020-02-14 PROCEDURE — 99999 PR PBB SHADOW E&M-EST. PATIENT-LVL III: CPT | Mod: PBBFAC,HCNC,, | Performed by: FAMILY MEDICINE

## 2020-02-14 PROCEDURE — 99214 PR OFFICE/OUTPT VISIT, EST, LEVL IV, 30-39 MIN: ICD-10-PCS | Mod: HCNC,S$GLB,, | Performed by: FAMILY MEDICINE

## 2020-02-14 PROCEDURE — 1159F PR MEDICATION LIST DOCUMENTED IN MEDICAL RECORD: ICD-10-PCS | Mod: HCNC,S$GLB,, | Performed by: FAMILY MEDICINE

## 2020-02-14 PROCEDURE — 99214 OFFICE O/P EST MOD 30 MIN: CPT | Mod: HCNC,S$GLB,, | Performed by: FAMILY MEDICINE

## 2020-02-14 PROCEDURE — 99999 PR PBB SHADOW E&M-EST. PATIENT-LVL III: ICD-10-PCS | Mod: PBBFAC,HCNC,, | Performed by: FAMILY MEDICINE

## 2020-02-14 PROCEDURE — 1159F MED LIST DOCD IN RCRD: CPT | Mod: HCNC,S$GLB,, | Performed by: FAMILY MEDICINE

## 2020-02-14 PROCEDURE — 1101F PR PT FALLS ASSESS DOC 0-1 FALLS W/OUT INJ PAST YR: ICD-10-PCS | Mod: HCNC,CPTII,S$GLB, | Performed by: FAMILY MEDICINE

## 2020-02-14 PROCEDURE — 1101F PT FALLS ASSESS-DOCD LE1/YR: CPT | Mod: HCNC,CPTII,S$GLB, | Performed by: FAMILY MEDICINE

## 2020-02-14 PROCEDURE — 1125F AMNT PAIN NOTED PAIN PRSNT: CPT | Mod: HCNC,S$GLB,, | Performed by: FAMILY MEDICINE

## 2020-02-14 PROCEDURE — 99499 UNLISTED E&M SERVICE: CPT | Mod: S$GLB,,, | Performed by: FAMILY MEDICINE

## 2020-02-14 PROCEDURE — 1125F PR PAIN SEVERITY QUANTIFIED, PAIN PRESENT: ICD-10-PCS | Mod: HCNC,S$GLB,, | Performed by: FAMILY MEDICINE

## 2020-02-14 NOTE — PROGRESS NOTES
Subjective     Patient presents today for review of chronic use of narcotic medications for control of pain.  Presently they report adequate control of their pain.  There is no evidence of inappropriate usage.    Review of Systems   Constitutional: Positive for fatigue.       Objective   Physical Exam   Constitutional: He appears well-developed and well-nourished.   HENT:   Head: Normocephalic and atraumatic.   Eyes: Pupils are equal, round, and reactive to light. EOM are normal.   Neck: Neck supple.   Cardiovascular: Normal rate and regular rhythm. Exam reveals no gallop and no friction rub.   Murmur heard.  Pulmonary/Chest: Effort normal and breath sounds normal. He has no wheezes. He has no rales.   Vitals reviewed.    Vitals:    02/14/20 1146   BP: (!) 102/58   Pulse: 74     MSE:  Normal mood, normal affect.  No suicidal or homicidal ideation.  No visual or auditory hallucinations.    Assessment   1. DDD (degenerative disc disease), lumbar     2. Opioid type dependence, continuous     3. Chronic combined systolic and diastolic congestive heart failure  Echo Color Flow Doppler? Yes   4. Aortic atherosclerosis     5. Exudative age-related macular degeneration of left eye with inactive choroidal neovascularization     6. Fatigue, unspecified type  Comprehensive metabolic panel    CBC auto differential    TSH       Plan   DDD (degenerative disc disease), lumbar with Opioid type dependence, continuous  - Continue current therapy  - Follow up in about 3 months (around 5/14/2020).    Chronic combined systolic and diastolic congestive heart failure with Aortic atherosclerosis  -     Echo Color Flow Doppler? Yes; Future  - Continue current therapy  - Serial blood pressure monitoring  - Diet and exercise education.  - Continue Cardiology    Exudative age-related macular degeneration of left eye with inactive choroidal neovascularization  - Continue Ophthalmology    Fatigue, unspecified type  -     Comprehensive metabolic  panel; Future; Expected date: 02/14/2020  -     CBC auto differential; Future; Expected date: 02/14/2020  -     TSH; Future; Expected date: 02/14/2020

## 2020-03-11 ENCOUNTER — LAB VISIT (OUTPATIENT)
Dept: LAB | Facility: HOSPITAL | Age: 85
End: 2020-03-11
Attending: FAMILY MEDICINE
Payer: MEDICARE

## 2020-03-11 DIAGNOSIS — R53.83 FATIGUE, UNSPECIFIED TYPE: ICD-10-CM

## 2020-03-11 LAB
BASOPHILS # BLD AUTO: 0.02 K/UL (ref 0–0.2)
BASOPHILS NFR BLD: 0.3 % (ref 0–1.9)
DIFFERENTIAL METHOD: ABNORMAL
EOSINOPHIL # BLD AUTO: 0.1 K/UL (ref 0–0.5)
EOSINOPHIL NFR BLD: 1.1 % (ref 0–8)
ERYTHROCYTE [DISTWIDTH] IN BLOOD BY AUTOMATED COUNT: 13.3 % (ref 11.5–14.5)
HCT VFR BLD AUTO: 40.9 % (ref 40–54)
HGB BLD-MCNC: 12.4 G/DL (ref 14–18)
IMM GRANULOCYTES # BLD AUTO: 0.01 K/UL (ref 0–0.04)
IMM GRANULOCYTES NFR BLD AUTO: 0.2 % (ref 0–0.5)
LYMPHOCYTES # BLD AUTO: 2.1 K/UL (ref 1–4.8)
LYMPHOCYTES NFR BLD: 33.6 % (ref 18–48)
MCH RBC QN AUTO: 30.2 PG (ref 27–31)
MCHC RBC AUTO-ENTMCNC: 30.3 G/DL (ref 32–36)
MCV RBC AUTO: 100 FL (ref 82–98)
MONOCYTES # BLD AUTO: 0.7 K/UL (ref 0.3–1)
MONOCYTES NFR BLD: 11.1 % (ref 4–15)
NEUTROPHILS # BLD AUTO: 3.3 K/UL (ref 1.8–7.7)
NEUTROPHILS NFR BLD: 53.7 % (ref 38–73)
NRBC BLD-RTO: 0 /100 WBC
PLATELET # BLD AUTO: 274 K/UL (ref 150–350)
PMV BLD AUTO: 10.3 FL (ref 9.2–12.9)
RBC # BLD AUTO: 4.1 M/UL (ref 4.6–6.2)
WBC # BLD AUTO: 6.13 K/UL (ref 3.9–12.7)

## 2020-03-11 PROCEDURE — 85025 COMPLETE CBC W/AUTO DIFF WBC: CPT | Mod: HCNC

## 2020-03-11 PROCEDURE — 84443 ASSAY THYROID STIM HORMONE: CPT | Mod: HCNC

## 2020-03-11 PROCEDURE — 36415 COLL VENOUS BLD VENIPUNCTURE: CPT | Mod: HCNC,PO

## 2020-03-11 PROCEDURE — 80053 COMPREHEN METABOLIC PANEL: CPT | Mod: HCNC

## 2020-03-11 RX ORDER — OXYCODONE AND ACETAMINOPHEN 7.5; 325 MG/1; MG/1
TABLET ORAL
Qty: 30 TABLET | Refills: 0 | OUTPATIENT
Start: 2020-03-11

## 2020-03-11 RX ORDER — OXYCODONE AND ACETAMINOPHEN 7.5; 325 MG/1; MG/1
TABLET ORAL
Qty: 30 TABLET | Refills: 0 | Status: SHIPPED | OUTPATIENT
Start: 2020-03-11 | End: 2020-03-17 | Stop reason: SDUPTHER

## 2020-03-11 NOTE — PROGRESS NOTES
Refill Routing Note     Medication(s) are appropriate for refill:    Medication Outside of Protocol    Appointments  past 15m or future 3m with PCP    Date Provider   Last Visit   2/14/2020 Johny Ortiz MD   Next Visit   6/18/2020 Johny Ortiz MD       Automatic Epic Protocol Generated Data:    Requested Prescriptions   Pending Prescriptions Disp Refills    oxyCODONE-acetaminophen (PERCOCET) 7.5-325 mg per tablet [Pharmacy Med Name: OXYCODON-ACETAMINOPHEN 7.5- 7.5-325 TAB] 30 tablet 0     Sig: TAKE 1 TABLET BY MOUTH EVERY 24 HOURS AS NEEDED FOR PAIN       Narcotics Refill Protocol Passed - 3/11/2020  1:35 PM        Passed - Patient seen within 3 months     Last visit with Johny Ortiz MD: 2/14/2020  Last visit in [facility]: 2/14/2020    Patient's next visit in [facility]: 6/18/2020           Passed - Med not refilled within 4 weeks           Note created:2:00 PM 03/11/2020

## 2020-03-11 NOTE — TELEPHONE ENCOUNTER
----- Message from Princess HOSSEIN Danielle sent at 3/11/2020  3:34 PM CDT -----  Contact: Jassi ingram/ [place] [facility]  Type: Needs Medical Advice    Who Called:  Jassi ingram/ [facility]  Pharmacy name and phone #:    [facility] [place] [place] - [address]  [address]  [place] [address]  Phone: [phone] Fax: [phone]  Best Call Back Number: [phone]  Additional Information: Requesting a call in regards to medication oxyCODONE-acetaminophen (PERCOCET) 7.5-325 mg per tablet, pharmacist is needing greater than 7 day supply medically necessary.     Let pt know that Radiologist has recommend additional imaging.    Scheduled Rt Diagnostic Mammogram + Rt Breast US  [facility] 10-6-17 arrive 10:15    Pt did not want to move her appt with Dr MICHAEL off of Friday.  We will see her at 12:15    kdl

## 2020-03-12 LAB
ALBUMIN SERPL BCP-MCNC: 3 G/DL (ref 3.5–5.2)
ALP SERPL-CCNC: 96 U/L (ref 55–135)
ALT SERPL W/O P-5'-P-CCNC: 12 U/L (ref 10–44)
ANION GAP SERPL CALC-SCNC: 9 MMOL/L (ref 8–16)
AST SERPL-CCNC: 15 U/L (ref 10–40)
BILIRUB SERPL-MCNC: 0.6 MG/DL (ref 0.1–1)
BUN SERPL-MCNC: 20 MG/DL (ref 8–23)
CALCIUM SERPL-MCNC: 9.4 MG/DL (ref 8.7–10.5)
CHLORIDE SERPL-SCNC: 108 MMOL/L (ref 95–110)
CO2 SERPL-SCNC: 26 MMOL/L (ref 23–29)
CREAT SERPL-MCNC: 0.8 MG/DL (ref 0.5–1.4)
EST. GFR  (AFRICAN AMERICAN): >60 ML/MIN/1.73 M^2
EST. GFR  (NON AFRICAN AMERICAN): >60 ML/MIN/1.73 M^2
GLUCOSE SERPL-MCNC: 93 MG/DL (ref 70–110)
POTASSIUM SERPL-SCNC: 4.1 MMOL/L (ref 3.5–5.1)
PROT SERPL-MCNC: 6.4 G/DL (ref 6–8.4)
SODIUM SERPL-SCNC: 143 MMOL/L (ref 136–145)
TSH SERPL DL<=0.005 MIU/L-ACNC: 3.82 UIU/ML (ref 0.4–4)

## 2020-03-13 NOTE — TELEPHONE ENCOUNTER
"----- Message from John Camp sent at 3/13/2020 12:08 PM CDT -----  Contact: Jenny/[facility]  Jenny called in regarding patients Rx for oxyCODONE-acetaminophen (PERCOCET) 7.5-325 mg per tablet 30 tablet, 0 refills last filled on 3/11/2020  No  Sig: TAKE 1 TABLET BY MOUTH EVERY 24 HOURS AS NEEDED FOR PAIN    Due to quantity a new Rx needs to be sent with, "greater than 7 day supply medical necessary".      [facility] - [place] - [address] [address]  [address] [address]  [place] [address]  Phone: [phone] Fax: [phone]      "

## 2020-03-16 RX ORDER — OXYCODONE AND ACETAMINOPHEN 7.5; 325 MG/1; MG/1
TABLET ORAL
Qty: 30 TABLET | Refills: 0 | OUTPATIENT
Start: 2020-03-16

## 2020-03-17 NOTE — TELEPHONE ENCOUNTER
----- Message from Rochelle Emily sent at 3/17/2020  1:30 PM CDT -----  Contact: Linda from Pharmacy  Type: Needs Medical Advice    Who Called:      Best Call Back Number:     Additional Information: Requesting a call back from Nurse, regarding Rx oxyCODONE-acetaminophen (PERCOCET) 7.5-325 mg per tablet 30 tablet  Needs to have why medically ness for more than 7 days pharmacy has been calling since 03/11 03/13 and has requested this week as well ,please call back this in today pharmacy stated it is going on a week with no response ,please call

## 2020-03-18 RX ORDER — OXYCODONE AND ACETAMINOPHEN 7.5; 325 MG/1; MG/1
TABLET ORAL
Qty: 30 TABLET | Refills: 0 | Status: SHIPPED | OUTPATIENT
Start: 2020-03-18 | End: 2020-04-22

## 2020-03-23 RX ORDER — TAMSULOSIN HYDROCHLORIDE 0.4 MG/1
CAPSULE ORAL
Qty: 90 CAPSULE | Refills: 3 | Status: SHIPPED | OUTPATIENT
Start: 2020-03-23 | End: 2021-03-12 | Stop reason: SDUPTHER

## 2020-04-06 DIAGNOSIS — M10.9 GOUT, ARTHRITIS: Primary | ICD-10-CM

## 2020-04-08 ENCOUNTER — TELEPHONE (OUTPATIENT)
Dept: RHEUMATOLOGY | Facility: CLINIC | Age: 85
End: 2020-04-08

## 2020-04-08 RX ORDER — ALLOPURINOL 100 MG/1
TABLET ORAL
Qty: 30 TABLET | Refills: 3 | Status: SHIPPED | OUTPATIENT
Start: 2020-04-08 | End: 2020-12-16 | Stop reason: SDUPTHER

## 2020-04-22 RX ORDER — OXYCODONE AND ACETAMINOPHEN 7.5; 325 MG/1; MG/1
TABLET ORAL
Qty: 30 TABLET | Refills: 0 | Status: SHIPPED | OUTPATIENT
Start: 2020-04-22 | End: 2020-05-29

## 2020-04-22 RX ORDER — OXYCODONE AND ACETAMINOPHEN 7.5; 325 MG/1; MG/1
TABLET ORAL
Qty: 30 TABLET | Refills: 0 | Status: SHIPPED | OUTPATIENT
Start: 2020-04-22 | End: 2020-04-22 | Stop reason: SDUPTHER

## 2020-04-22 NOTE — PROGRESS NOTES
Refill Routing Note   Medication(s) are not appropriate for processing by [facility]:       Outside of protocol               Medication reconciliation completed: No        Appointments [id number] or future 3m with PCP    Date Provider   Last Visit   2/14/2020 Johny Ortiz MD   Next Visit   6/18/2020 Johny Ortiz MD     Automatic Epic Protocol Generated Data:    Requested Prescriptions   Pending Prescriptions Disp Refills    oxyCODONE-acetaminophen (PERCOCET) 7.5-325 mg per tablet [Pharmacy Med Name: OXYCODON-ACETAMINOPHEN 7.5- 7.5-325 TAB] 30 tablet 0     Sig: TAKE 1 TABLET BY MOUTH EVERY 24 HOURS AS NEEDED FOR PAIN       Narcotics Refill Protocol Passed - 4/22/2020 12:38 PM        Passed - Patient seen within 3 months     Last visit with Johny Ortiz MD: 2/14/2020  Last visit in [facility]: Visit date not found    Patient's next visit in [facility]: Visit date not found           Passed - Med not refilled within 4 weeks           Note composed:2:24 PM 04/22/2020

## 2020-04-22 NOTE — TELEPHONE ENCOUNTER
"----- Message from Jassi Perez sent at 4/22/2020  4:52 PM CDT -----  Contact: Jassi ([facility])  Type:  Pharmacy Calling to Clarify an RX    Name of Caller:  Jassi  Pharmacy Name:    [facility] - [place] - [address]  [address]  [place] [address]  Phone: [phone] Fax: [phone]  Prescription Name:  oxyCODONE-acetaminophen (PERCOCET) 7.5-325 mg per tablet  What do they need to clarify?:  "medically necessary for greater than 7 days"  Best Call Back Number:  [phone]  Additional Information:  NA  "

## 2020-04-23 ENCOUNTER — PATIENT OUTREACH (OUTPATIENT)
Dept: ADMINISTRATIVE | Facility: OTHER | Age: 85
End: 2020-04-23

## 2020-04-30 RX ORDER — CLOPIDOGREL BISULFATE 75 MG/1
TABLET ORAL
Qty: 90 TABLET | Refills: 4 | Status: ON HOLD | OUTPATIENT
Start: 2020-04-30 | End: 2021-02-21 | Stop reason: HOSPADM

## 2020-04-30 RX ORDER — CARVEDILOL 3.12 MG/1
TABLET ORAL
Qty: 180 TABLET | Refills: 4 | Status: SHIPPED | OUTPATIENT
Start: 2020-04-30 | End: 2021-03-12 | Stop reason: SDUPTHER

## 2020-05-14 RX ORDER — PREDNISONE 5 MG/1
TABLET ORAL
Qty: 30 TABLET | Refills: 6 | Status: SHIPPED | OUTPATIENT
Start: 2020-05-14 | End: 2021-02-02

## 2020-05-28 NOTE — PROGRESS NOTES
Refill Routing Note    Medication(s) are not appropriate for processing by [facility]:       Outside of protocol           Medication reconciliation completed: No      Automatic Epic Protocol Generated Data:    Requested Prescriptions   Pending Prescriptions Disp Refills    oxyCODONE-acetaminophen (PERCOCET) 7.5-325 mg per tablet [Pharmacy Med Name: OXYCODON-ACETAMINOPHEN 7.5- 7.5-325 TAB] 30 tablet 0     Sig: TAKE 1 TABLET BY MOUTH EVERY 24 HOURS AS NEEDED FOR PAIN       Narcotics Refill Protocol Failed - 5/28/2020  2:45 PM        Failed - Patient seen within 3 months     Last visit with Johny Ortiz MD: 2/14/2020  Last visit in [facility]: 2/14/2020    Patient's next visit in [facility]: 6/18/2020           Passed - Med not refilled within 4 weeks           Appointments  past 12m or future 3m with PCP    Date Provider   Last Visit   2/14/2020 Johny Ortiz MD   Next Visit   6/18/2020 Johny Ortiz MD   ED visits in past 90 days: 0     Note composed:6:50 PM 05/28/2020

## 2020-05-28 NOTE — TELEPHONE ENCOUNTER
----- Message from Colton Chang sent at 5/28/2020  2:28 PM CDT -----  Type: Needs Medical Advice  Who Called:  Son/Pascual    Pharmacy name and phone #:    [place] [facility] [place] - [address]  [address]  [place] [address]  Phone: [phone] Fax: [phone]    Best Call Back Number: [phone]  Additional Information: Caller states that the patient has been trying to get a refill for 1 month with no response:    oxyCODONE-acetaminophen (PERCOCET) 7.5-325 mg per tablet     Please call to advise

## 2020-05-29 RX ORDER — OXYCODONE AND ACETAMINOPHEN 7.5; 325 MG/1; MG/1
TABLET ORAL
Qty: 30 TABLET | Refills: 0 | Status: SHIPPED | OUTPATIENT
Start: 2020-05-29 | End: 2020-07-14 | Stop reason: SDUPTHER

## 2020-06-10 ENCOUNTER — PATIENT OUTREACH (OUTPATIENT)
Dept: ADMINISTRATIVE | Facility: OTHER | Age: 85
End: 2020-06-10

## 2020-06-15 RX ORDER — ATORVASTATIN CALCIUM 40 MG/1
TABLET, FILM COATED ORAL
Qty: 90 TABLET | Refills: 4 | Status: SHIPPED | OUTPATIENT
Start: 2020-06-15 | End: 2021-03-12 | Stop reason: SDUPTHER

## 2020-07-14 RX ORDER — OXYCODONE AND ACETAMINOPHEN 7.5; 325 MG/1; MG/1
1 TABLET ORAL EVERY 12 HOURS PRN
Qty: 30 TABLET | Refills: 0 | Status: SHIPPED | OUTPATIENT
Start: 2020-07-14 | End: 2020-12-23 | Stop reason: SDUPTHER

## 2020-07-14 NOTE — TELEPHONE ENCOUNTER
----- Message from Mary Summers sent at 7/14/2020  3:48 PM CDT -----  Regarding: refill  Contact: Rhona Parikh (Spouse)  Patient requesting a refill on pain medication.     Patient will be using   [facility] - [place] - [address]  [address]  [place] [address]  Phone: [phone] Fax: [phone]    Please call Rhona Parikh (Spouse) [phone].     Thanks!

## 2020-07-16 ENCOUNTER — DOCUMENTATION ONLY (OUTPATIENT)
Dept: FAMILY MEDICINE | Facility: CLINIC | Age: 85
End: 2020-07-16

## 2020-07-16 NOTE — PROGRESS NOTES
PA:   Oxycodone-Acetaminophen 7.5-325 mg tablet    [facility] key:  [id number]  Case ID:   [id number]  [place]  ----------------------------  APPROVED  Valid 7/1/2020 - 7/16/2021

## 2020-08-17 ENCOUNTER — PES CALL (OUTPATIENT)
Dept: ADMINISTRATIVE | Facility: CLINIC | Age: 85
End: 2020-08-17

## 2020-08-20 DIAGNOSIS — F11.20 OPIOID TYPE DEPENDENCE, CONTINUOUS: ICD-10-CM

## 2020-08-20 DIAGNOSIS — I50.42 CHRONIC COMBINED SYSTOLIC AND DIASTOLIC CONGESTIVE HEART FAILURE: Primary | ICD-10-CM

## 2020-08-20 DIAGNOSIS — M51.36 DDD (DEGENERATIVE DISC DISEASE), LUMBAR: ICD-10-CM

## 2020-08-20 NOTE — PROGRESS NOTES
Refill Routing Note   Medication(s) are not appropriate for processing by [facility]:    - Outside of Protocol       Automatic Epic Protocol Generated Data:    Requested Prescriptions   Pending Prescriptions Disp Refills    oxyCODONE-acetaminophen (PERCOCET) 7.5-325 mg per tablet [Pharmacy Med Name: OXYCODONE-ACETAMINOPHEN 7.5 7.5-325 Tablet] 30 tablet 0     Sig: TAKE 1 TABLET BY MOUTH EVERY 12 HOURS AS NEEDED FOR PAIN       Narcotics Refill Protocol Failed - 8/20/2020  4:21 PM        Failed - Patient seen within 3 months     Last visit with Johny Ortiz MD: 2/14/2020  Last visit in [facility]: Visit date not found    Patient's next visit in [facility]: Visit date not found           Passed - Med not refilled within 4 weeks       Opiods Refill Protocol Failed - 8/20/2020  4:21 PM        Failed - Recent visit with authorizing provider in the past 3 months        Passed - No positive pregnancy test in past 12 months         Passed - Patient has signed pain contract              Appointments [id number] or future 3m with PCP    Date Provider   Last Visit   2/14/2020 Johny Ortiz MD   Next Visit   Visit date not found Johny Ortiz MD   ED visits in past 90 days: 0     Note composed:4:28 PM 08/20/2020

## 2020-08-24 NOTE — TELEPHONE ENCOUNTER
----- Message from Madie Argueta sent at 8/24/2020  3:46 PM CDT -----  Regarding: refill  Contact: Pascual bowen  Type:  RX Refill Request    Who Called:  Pascual bowen  Refill or New Rx:  refill  RX Name and Strength:   oxyCODONE-acetaminophen (PERCOCET) 7.5-325 mg per tablet   How is the patient currently taking it? (ex. 1XDay):  as directed  Is this a 30 day or 90 day RX:  30  Preferred Pharmacy with phone number:    [facility] - [place] - [address]  [address]  [place] [address]  Phone: [phone] Fax: [phone]    \      Local or Mail Order:  local  Ordering Provider:  Diana Waldrop Call Back Number:  [phone]  Additional Information:  Pls call pt back to adv

## 2020-08-25 RX ORDER — OXYCODONE AND ACETAMINOPHEN 7.5; 325 MG/1; MG/1
TABLET ORAL
Qty: 30 TABLET | Refills: 0 | OUTPATIENT
Start: 2020-08-25

## 2020-08-25 RX ORDER — OXYCODONE AND ACETAMINOPHEN 7.5; 325 MG/1; MG/1
1 TABLET ORAL EVERY 12 HOURS PRN
Qty: 30 TABLET | Refills: 0 | OUTPATIENT
Start: 2020-08-25

## 2020-08-27 ENCOUNTER — TELEPHONE (OUTPATIENT)
Dept: RHEUMATOLOGY | Facility: CLINIC | Age: 85
End: 2020-08-27

## 2020-09-11 ENCOUNTER — CARE AT HOME (OUTPATIENT)
Dept: HOME HEALTH SERVICES | Facility: CLINIC | Age: 85
End: 2020-09-11
Payer: COMMERCIAL

## 2020-09-11 VITALS
SYSTOLIC BLOOD PRESSURE: 122 MMHG | HEIGHT: 67 IN | OXYGEN SATURATION: 98 % | HEART RATE: 83 BPM | RESPIRATION RATE: 16 BRPM | WEIGHT: 130 LBS | DIASTOLIC BLOOD PRESSURE: 62 MMHG | TEMPERATURE: 98 F | BODY MASS INDEX: 20.4 KG/M2

## 2020-09-11 DIAGNOSIS — I50.42 CHRONIC COMBINED SYSTOLIC AND DIASTOLIC CONGESTIVE HEART FAILURE: ICD-10-CM

## 2020-09-11 DIAGNOSIS — I10 ESSENTIAL HYPERTENSION: ICD-10-CM

## 2020-09-11 DIAGNOSIS — Z95.2 S/P AVR (AORTIC VALVE REPLACEMENT): ICD-10-CM

## 2020-09-11 DIAGNOSIS — M51.36 DDD (DEGENERATIVE DISC DISEASE), LUMBAR: ICD-10-CM

## 2020-09-11 DIAGNOSIS — I25.5 CARDIOMYOPATHY, ISCHEMIC: ICD-10-CM

## 2020-09-11 DIAGNOSIS — R41.3 MEMORY LOSS: ICD-10-CM

## 2020-09-11 DIAGNOSIS — N40.0 BENIGN NON-NODULAR PROSTATIC HYPERPLASIA WITHOUT LOWER URINARY TRACT SYMPTOMS: ICD-10-CM

## 2020-09-11 DIAGNOSIS — I25.10 CORONARY ARTERY DISEASE INVOLVING NATIVE CORONARY ARTERY OF NATIVE HEART WITHOUT ANGINA PECTORIS: ICD-10-CM

## 2020-09-11 DIAGNOSIS — Z51.5 PALLIATIVE CARE ENCOUNTER: Primary | ICD-10-CM

## 2020-09-11 DIAGNOSIS — H91.93 BILATERAL HEARING LOSS, UNSPECIFIED HEARING LOSS TYPE: ICD-10-CM

## 2020-09-11 DIAGNOSIS — Z71.89 ADVANCED CARE PLANNING/COUNSELING DISCUSSION: ICD-10-CM

## 2020-09-11 DIAGNOSIS — M10.9 GOUT, ARTHRITIS: ICD-10-CM

## 2020-09-11 PROCEDURE — 1101F PT FALLS ASSESS-DOCD LE1/YR: CPT | Mod: CPTII,S$GLB,, | Performed by: NURSE PRACTITIONER

## 2020-09-11 PROCEDURE — 1101F PR PT FALLS ASSESS DOC 0-1 FALLS W/OUT INJ PAST YR: ICD-10-PCS | Mod: CPTII,S$GLB,, | Performed by: NURSE PRACTITIONER

## 2020-09-11 PROCEDURE — 1125F AMNT PAIN NOTED PAIN PRSNT: CPT | Mod: S$GLB,,, | Performed by: NURSE PRACTITIONER

## 2020-09-11 PROCEDURE — 1159F PR MEDICATION LIST DOCUMENTED IN MEDICAL RECORD: ICD-10-PCS | Mod: S$GLB,,, | Performed by: NURSE PRACTITIONER

## 2020-09-11 PROCEDURE — 99497 ADVNCD CARE PLAN 30 MIN: CPT | Mod: 25,S$GLB,, | Performed by: NURSE PRACTITIONER

## 2020-09-11 PROCEDURE — 99350 PR HOME VISIT,ESTAB PATIENT,LEVEL IV: ICD-10-PCS | Mod: S$GLB,,, | Performed by: NURSE PRACTITIONER

## 2020-09-11 PROCEDURE — 1125F PR PAIN SEVERITY QUANTIFIED, PAIN PRESENT: ICD-10-PCS | Mod: S$GLB,,, | Performed by: NURSE PRACTITIONER

## 2020-09-11 PROCEDURE — 99497 PR ADVNCD CARE PLAN 30 MIN: ICD-10-PCS | Mod: 25,S$GLB,, | Performed by: NURSE PRACTITIONER

## 2020-09-11 PROCEDURE — 99350 HOME/RES VST EST HIGH MDM 60: CPT | Mod: S$GLB,,, | Performed by: NURSE PRACTITIONER

## 2020-09-11 PROCEDURE — 1159F MED LIST DOCD IN RCRD: CPT | Mod: S$GLB,,, | Performed by: NURSE PRACTITIONER

## 2020-09-11 RX ORDER — BENAZEPRIL HYDROCHLORIDE 5 MG/1
TABLET ORAL
Qty: 90 TABLET | Refills: 4 | Status: SHIPPED | OUTPATIENT
Start: 2020-09-11 | End: 2021-02-02

## 2020-09-11 NOTE — PROGRESS NOTES
"[facility]  Palliative Care Home Visit    Visit Date: 2020  Encounter Provider: Leida Brown NP  PCP:  Johny Ortiz MD    Subjective:      Patient ID: Kashmir Parikh is a 87 y.o. male.    Consult Requested By:  Dr. Johny Ortiz  Reason for Consult:  Palliative Care  Visit time: 1:00 pm - 2:30 pm    Kashmir is being seen at home due to physical debility that presents a taxing effort to leave the home, to mitigate high risk of hospital readmission and/or due to the limited availability of reliable or safe options for transportation to the point of access to the provider. Prior to treatment on this visit the chart was reviewed and patient consent was obtained.      Chief Complaint: Establish Care    Kashmir is an 87 year old male with PMHX of CAD, h/o AVR replacement, MI and CABG, bilateral carotid artery stenosis, HTN, BPH, ischemic cardiomyopathy, gout, hyperlipidemia and memory loss.    Kashmir is being seen today to establish Palliative Care. He is found at home today ambulating without difficulty and is AAO x 3 with some forgetfulness. His wife Crystal is present and states that her health is "not good" and she is currently on hospice care. Patient's son Pascual is present today and also lives with his parents to help care for them. Kashmir reports that he is a [place]  and has not checked into any VA benefits that may be available to him. Encouraged both him and son today to look into that. Kashmir is also retired from [employer] and [employer]. Kashmir and his wife have 1 surviving son, Wyatt and a daughter that is .    Kashmir reports that feels as though he is doing "fairly well" for his age. He has some chronic back pain that he manages with Tylenol and OTC muscle rubbing creams. He reports that his appetite is pretty good and has not had any recent weight loss. Current BMI is 20. He reports he has always been thin and son Pascual confirms this. Kashmir complains of intermittent gout in " "both ankles that flares up from time to time for which he takes Allopurinol. He spends most of his time at home, rarely leaving due to poor hearing and poor eyesight he says. He has not seen his cardiologist in over a year now but reports he will make an appointment soon. He denies any chest pain, shortness of breath or swelling in extremities. Son Pascual provides transportation and groceries for patient. Kashmir does report that his memory is getting "bad" with short term deficits. Pascual denies patient has any behavioral issues, no wandering, anxiety, hallucinations.    VSS. Denies fever, chest pain, shortness of breath, nausea, vomiting, diarrhea. Denies any acute issues, concerns or complaints to address on today's visit. Reports taking all medications as prescribed. No other needs identified at this time. Risks of environmental exposure to coronavirus discussed including: social distancing, hand hygiene, and limiting departures from the home for necessities only.  Reports understanding and willingness to comply.       Goals of care discussed with Kashmir today. He states that he is would like to be a full code at this time. He wishes to stay in his home for the remainder of his life.       ADVANCED DIRECTIVES  I initiated the process of advance care planning today and explained the importance of this process to the patient and son Pascual.  I introduced the concept of advance directives to the patient, as well. Then the patient received detailed information about the importance of designating a Health Care Power of  (HCPOA). He was also instructed to communicate with this person about their wishes for future healthcare, should he become sick and lose decision-making capacity. Jin Garner and patient would like to review AD forms and complete at later date.         Review of Systems   Constitution: Negative for malaise/fatigue.   Eyes: Negative for blurred vision.   Cardiovascular: Negative for chest pain, " "claudication, cyanosis, dyspnea on exertion, irregular heartbeat, leg swelling, near-syncope, orthopnea, palpitations, paroxysmal nocturnal dyspnea and syncope.   Respiratory: Negative for cough and shortness of breath.    Hematologic/Lymphatic: Does not bruise/bleed easily.   Musculoskeletal: Negative for back pain, falls, joint pain, muscle cramps, muscle weakness and myalgias. Positive for occasional gout of ankles  Gastrointestinal: Negative for abdominal pain, change in bowel habit, nausea and vomiting.   Genitourinary: Negative for urgency.   Neurological: Negative for dizziness, focal weakness and light-headedness. Positive for memory loss, mild.      Assessments:  · Environmental: single story home, lives with wife Crystal and son Pascual. Home is cluttered and not clean but with adequate lighting and temperature  · Functional Status: independent  · Safety: fall risk, 3 inside dogs  · Nutritional: has adequate access, reports "good" appetite  · Home Health/DME/Supplies: No Home Health. No DME.    History:  Past Medical History:   Diagnosis Date    Bilateral carotid artery disease 11/18/2016    BPH (benign prostatic hypertrophy)     CAD (coronary artery disease) 1/8/2013 4/2016 failed  of LAD 12/12 LAD-35%, OM 75%, small RCA    Cardiomyopathy, ischemic 10/20/2017    35%    Cataract     OU    CHF (congestive heart failure) 3/9/2016    Coronary artery disease     Diverticulitis     Gout, arthritis     HEARING LOSS     Heart murmur     asymptomatic    HTN (hypertension)     Pt states resolved    Hyperlipidemia     Myocardial infarction     Recurrent nephrolithiasis     S/P AVR (aortic valve replacement) 1/8/2013    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 mosaic     S/P CABG x 1 1/8/2013 12/12 VG-OM      Family History   Problem Relation Age of Onset    Cancer Mother     Cancer Father      Past Surgical History:   Procedure Laterality Date    AORTIC VALVE REPLACEMENT  12/05/2012    #21 Medtronic Mosaic " tissue valve    CARDIAC SURGERY      CARPAL TUNNEL RELEASE  06/06    bilateral    CHOLECYSTECTOMY      COLONOSCOPY      CORONARY ANGIOPLASTY WITH STENT PLACEMENT      EYE SURGERY      retinal detachment x 2    HAND SURGERY      HERNIA REPAIR      umbilical    JOINT REPLACEMENT      bilateral knee    RETINAL DETACHMENT SURGERY Bilateral 1980    RD spontaneous OU    ROTATOR CUFF REPAIR      bilateral    SPINAL CORD STIMULATOR IMPLANT  11/15/2011    SUBTOTAL COLECTOMY       Review of patient's allergies indicates:   Allergen Reactions    Toradol [ketorolac] Other (See Comments)     Renal failure    Pork/porcine containing products Other (See Comments)     gout    Shrimp Other (See Comments)     Gout       Medications:    Current Outpatient Medications:     acetaminophen (TYLENOL) 325 MG tablet, Take 1 tablet (325 mg total) by mouth every 6 (six) hours as needed for Pain., Disp: , Rfl:     allopurinoL (ZYLOPRIM) 100 MG tablet, TAKE 1 TABLET (100 MG TOTAL) BY MOUTH EVERY DAY TO PREVENT GOUT, Disp: 30 tablet, Rfl: 3    aspirin 81 MG Chew, Take 81 mg by mouth once daily., Disp: , Rfl:     atorvastatin (LIPITOR) 40 MG tablet, TAKE 1 TABLET BY MOUTH NIGHTLY, Disp: 90 tablet, Rfl: 4    azelastine (ASTELIN) 137 mcg (0.1 %) nasal spray, 1 spray (137 mcg total) by Nasal route 2 (two) times daily., Disp: 90 mL, Rfl: 3    benazepriL (LOTENSIN) 5 MG tablet, TAKE 1 TABLET BY MOUTH ONCE DAILY, Disp: 90 tablet, Rfl: 4    carvediloL (COREG) 3.125 MG tablet, TAKE 1 TABLET BY MOUTH TWO TIMES A DAY AS DIRECTED, Disp: 180 tablet, Rfl: 4    clopidogreL (PLAVIX) 75 mg tablet, TAKE 1 TABLET BY MOUTH ONCE DAILY, Disp: 90 tablet, Rfl: 4    docusate sodium (COLACE) 100 MG capsule, Take 100 mg by mouth as needed for Constipation., Disp: , Rfl:     donepezil (ARICEPT) 10 MG tablet, Take 1 tablet (10 mg total) by mouth every evening., Disp: 30 tablet, Rfl: 11    fluticasone (FLONASE) 50 mcg/actuation nasal spray, 2 sprays  "(100 mcg total) by Each Nare route once daily., Disp: 1 Bottle, Rfl: 12    multivitamin capsule, Take 1 capsule by mouth once daily., Disp: , Rfl:     omega-3 fatty acids Cap, Take 1 capsule by mouth 2 (two) times daily. , Disp: , Rfl:     tamsulosin (FLOMAX) 0.4 mg Cap, TAKE 1 CAPSULE BY MOUTH ONCE DAILY AS DIRECTED, Disp: 90 capsule, Rfl: 3    traZODone (DESYREL) 100 MG tablet, TAKE 1 TABLET BY MOUTH NIGHTLY AS NEEDED FOR INSOMNIA, Disp: 90 tablet, Rfl: 3    COLCRYS 0.6 mg tablet, TAKE ONE TABLET BY MOUTH ONCE DAILY FOR GOUT (Patient not taking: Reported on 9/11/2020), Disp: 90 tablet, Rfl: 0    ketoconazole (NIZORAL) 2 % cream, aaa face twice daily prn scaling and redness (Patient not taking: Reported on 9/11/2020), Disp: 60 g, Rfl: 3    ketoconazole (NIZORAL) 2 % shampoo, Wash face/beard area with medicated shampoo 3x/week (Patient not taking: Reported on 10/21/2019), Disp: 120 mL, Rfl: 5    lactulose (CHRONULAC) 10 gram/15 mL solution, TAKE 30 MLS (20 GRAMS TOTAL) BY MOUTH ONCE DAILY (Patient not taking: Reported on 9/11/2020), Disp: 946 mL, Rfl: 5    oxyCODONE-acetaminophen (PERCOCET) 7.5-325 mg per tablet, Take 1 tablet by mouth every 12 (twelve) hours as needed for Pain. (Patient not taking: Reported on 9/11/2020), Disp: 30 tablet, Rfl: 0    predniSONE (DELTASONE) 5 MG tablet, TAKE 1 TABLET (5 MG TOTAL) BY MOUTH DAILY AS NEEDED FOR GOUTY FLARE (Patient not taking: Reported on 9/11/2020), Disp: 30 tablet, Rfl: 6    24h Oral Morphine Equivalents (OME):  N/A    Objective:     Physical Exam:  Vitals:    09/11/20 1352   BP: 122/62   Pulse: 83   Resp: 16   Temp: 98.4 °F (36.9 °C)   TempSrc: Temporal   SpO2: 98%   Weight: 59 kg (130 lb)   Height: 5' 7" (1.702 m)   PainSc:   3   PainLoc: Back     Body mass index is 20.36 kg/m².    Physical Exam   Constitutional: He is oriented to person, place, and time. He appears well-developed and well-nourished. He is cooperative.   HENT: KAILA.  Head: Normocephalic and " "atraumatic.   Eyes: Conjunctivae are normal. Right eye exhibits no exudate. Left eye exhibits no exudate.   Neck: Normal range of motion. Neck supple. Normal carotid pulses and no JVD present. Carotid bruit is not present. No thyromegaly present.   Cardiovascular: Normal rate, regular rhythm, normal heart sounds and intact distal pulses.   Harsh midsystolic murmur is present at the upper right sternal border radiating to the neck.  Pulses:       Carotid pulses are 2+ on the right side, and 2+ on the left side.       Radial pulses are 2+ on the right side, and 2+ on the left side.        Dorsalis pedis pulses are 2+ on the right side, and 2+ on the left side.        Posterior tibial pulses are 2+ on the right side, and 2+ on the left side.   Well healed midline sternal incision.  Pulmonary/Chest: Effort normal and breath sounds normal.   Abdominal: Soft. Bowel sounds are normal.   Musculoskeletal: Normal range of motion. He exhibits no edema.   Neurological: He is alert and oriented to person, place, and time. Gait is slow but normal.   Skin: Skin is warm, dry and intact. No cyanosis. Nails show no clubbing.   Psychiatric: He has a normal mood and affect. His speech is normal and behavior is normal. Judgment and thought content normal.       Review of Symptoms    Symptom Assessment (ESAS 0-10 Scale)  Pain:  4  Dyspnea:  0  Anxiety:  0  Nausea:  0  Depression:  0  Anorexia:  0  Fatigue:  2  Insomnia:  0  Restlessness:  0  Agitation:  0     CAM / Delirium:  Negative  Constipation:  Negative  Diarrhea:  Negative    Comments:  Reports has BM once every 2-3 days and this is his "normal" for many years.    Pain Assessment:  Location(s): back    Back       Back Location:  Generalized       Quality:  Aching       Quantity:  4/10 in intensity       Chronicity:  year(s) ago, unchanged       Aggravating Factors:  Activity       Alleviating Factors:  None       Associated Symptoms:  None    Performance Status:  60    ECOG " Performance Status Grade:  1 - Ambulates, capable of light work    Living Arrangements:  Lives with spouse and Lives with family    Spiritual:  F - Nikia and Belief:  Cheondoism  I - Importance:  Important  A - Address in Care:  No issues     Time-Based Charting:  Yes  Chart Review: 10 minutes  Face to Face: 20 minutes  Symptom Assessment: 15 minutes  Coordination of Care: 5 minutes  Advance Care Plannin minutes  Goals of Care: 10 minutes    Total Time Spent: 90 minutes      Advance Care Planning   Advance Directives:   Living Will: No (forms left with patient and son today)    LaPOST: No    Do Not Resuscitate Status: No    Medical Power of : No (forms left with patient and son today)      Decision Making:  Patient answered questions         Labs:  CBC:   WBC   Date Value Ref Range Status   2020 6.13 3.90 - 12.70 K/uL Final      82 -  Hemoglobin   Date Value Ref Range Status   2020 12.4 (L) 14.0 - 18.0 g/dL Final   T:  Hematocrit   Date Value Ref Range Status   2020 40.9 40.0 - 54.0 % Final      Mean Corpuscular Volume   Date Value Ref Range Status   2020 100 (H) 82 - 98 fL Final    BILITOT 0.6 2020       Albumin:   Albumin   Date Value Ref Range Status   2020 3.0 (L) 3.5 - 5.2 g/dL Final     Protein:   Total Protein   Date Value Ref Range Status   2020 6.4 6.0 - 8.4 g/dL Final       Radiology:  I have reviewed all pertinent imaging results/findings within the past 24 hours.      Assessment:     1. Palliative care encounter    2. DDD (degenerative disc disease), lumbar    3. Chronic combined systolic and diastolic congestive heart failure    4. Cardiomyopathy, ischemic    5. Essential hypertension    6. Benign non-nodular prostatic hyperplasia without lower urinary tract symptoms    7. Bilateral hearing loss, unspecified hearing loss type    8. S/P AVR (aortic valve replacement)    9. Coronary artery disease involving native coronary artery of native  heart without angina pectoris    10. Gout, arthritis    11. Memory loss    12. Advanced care planning/counseling discussion        Plan:   Kashmir was seen today for establish care.    Diagnoses and all orders for this visit:    Palliative care encounter    DDD (degenerative disc disease), lumbar  -     Ambulatory referral/consult to [facility]  Active. No longer on opioid therapy. Reports using Tylenol and muscle rubs for relief.  Chronic combined systolic and diastolic congestive heart failure  -     Ambulatory referral/consult to [facility]  Stable. Has not seen Cardiology in a year. States will make a follow up appointment soon. Discussed low salt diet/cardiac diet.   Cardiomyopathy, ischemic  -     Ambulatory referral/consult to [facility]; Future  Stable. Has not seen Cardiology in a year. States will make a follow up appointment soon. Discussed low salt diet/cardiac diet.   Essential hypertension  Stable. Continue medications as prescribed. Follow cardiac/low salt diet.  Benign non-nodular prostatic hyperplasia without lower urinary tract symptoms  Stable.  Bilateral hearing loss, unspecified hearing loss type  Active on both ears. Has hearing aids but doesn't want to wear them.  S/P AVR (aortic valve replacement)  Stable. Has not seen Cardiology in a year. States will make a follow up appointment soon. Discussed low salt diet/cardiac diet.   Coronary artery disease involving native coronary artery of native heart without angina pectoris  Stable. Has not seen Cardiology in a year. States will make a follow up appointment soon. Discussed low salt diet/cardiac diet.   Gout, arthritis  Stable. Reports occasional flare ups in ankles. Discussed low purine diet.  Memory loss  Stable on Donepezil. AAO x 3 with some forgetfulness.   Advanced care planning/counseling discussion  30 minute discussion had today with patient  and son Pascual. Forms left for review.       Were controlled substances prescribed?  No    > 50% of 90 min visit spent in chart review, face to face discussion of goals of care,  symptom assessment, coordination of care and emotional support. Topics discussed today include: Palliative care, gout, memory loss, safety, fall prevention, cardiomyopathy, DDD lumbar, CHF.    30 minutes of this 90 minute visit was spent in advanced care planning. Discussed palliative care, hospice and hospice benefits when/if patient becomes hospice appropriate, LaPOST form, ADs, and goals of care left with patient and son Pascual to review and complete.     Follow Up Appointments:   No future appointments.    Verbal consent for visit obtained from patient today.     Signature:  Leida Brown NP     Attestation:   Screening criteria to assess the level of the patient's risk for infection with COVID-19 as recommended by the CDC at the time of the above documented home visit concluded appropriateness to proceed. Universal precautions were maintained at all times, including provider wearing a mask and gloves during entire visit and use of 60% alcohol gel hand  immediately prior to entry and upon departure of patient's home as well as cleaning of equipment used in home visit with antibacterial/germicidal disposable wipes.

## 2020-09-12 PROBLEM — Z51.5 PALLIATIVE CARE ENCOUNTER: Status: ACTIVE | Noted: 2020-09-12

## 2020-09-12 PROBLEM — R41.3 MEMORY LOSS: Status: ACTIVE | Noted: 2020-09-12

## 2020-09-12 PROBLEM — Z71.89 ADVANCED CARE PLANNING/COUNSELING DISCUSSION: Status: ACTIVE | Noted: 2020-09-12

## 2020-09-12 NOTE — PATIENT INSTRUCTIONS
Patient Instructions:  - [facility] Nurse Practitioner to schedule home follow-up visit with patient in 4-6 weeks or as needed.  - Continue all medications, treatments and therapies as ordered.   - Follow all instructions, recommendations as discussed.  - Maintain Safety Precautions at all times.  - Attend all medical appointments as scheduled.  - For worsening symptoms: call Primary Care Physician or Nurse Practitioner.  - For emergencies, call 911 or immediately report to the nearest emergency room.  - Limit Risks of environmental exposure to coronavirus/COVID-19 as discussed including: social distancing, hand hygiene, and limiting departures from the home for necessities only.       Advance Medical Directive    An advance medical directive is a form that lets you plan ahead for the care youd want if you could no longer express your wishes. This statement outlines the medical treatment youd want or names the person youd wish to make healthcare decisions for you. Be aware that laws vary from state to state, and it may be worthwhile to talk with an .  Writing down your wishes  · An advance directive is important whether youre young or old. Injury or illness can strike at any age.  · Decide what is important to you and the kind of treatment youd want, or not want to have.  · Some states allow only one kind of advance directive. Some let you do both a Durable Power of  for Health Care and a Living Will. Some states put both kinds on the same form.  A Durable Power of  for Health Care  · This form lets you name someone else to be your agent.  · This person can decide on treatment for you only when you cant speak for yourself.  · You do not need to be at the end of your life. He or she could speak for you if you were in a coma but were likely to recover.  A Living Will  · This form lets you list the care you want at the end of your life.  · A living will applies only if you wont live without  medical treatment. It would apply if you had advanced cancer, a massive stroke, or other serious illness from which you will not recover.  · It takes effect only when you can no longer express your wishes yourself.  Date Last Reviewed: 2/13/2016  © 4630-6169 [employer]. [address] [place] [address]. All rights reserved. This information is not intended as a substitute for professional medical care. Always follow your healthcare professional's instructions.        Living with Cardiomyopathy  Your doctor will outline a treatment plan to help you live better with cardiomyopathy. This will stop your condition from getting worse and possibly causing serious problems for your heart and lungs. Be sure to follow your healthcare provider's instructions. You can also make some lifestyle changes that will help your heart.     Weigh yourself daily and write down your results.   Follow your treatment plan  Be sure to visit your healthcare provider regularly. Mention any problems you are having with your treatment plan. Be honest if you are not doing something your provider has suggested. He or she may be able to make some changes to help your plan work better for you. Not following your provider's advice could result in a serious or life-threatening complication. You would need to stay in the hospital.  Balance activity and rest  Having cardiomyopathy may mean you get tired more quickly because your heart doesn't work as well as it should. But this shouldnt keep you from being active. In fact, being active may help you feel better. Talk with your healthcare provider about how much activity is right for you.  Take steps to help your heart  · Stop smoking. Smoking damages your heart muscle and blood vessels. It t also causes changes to your lungs that can make it more difficult to breathe and for your lungs to work. Smoking reduces the oxygen in your blood. Having less oxygen in your blood will cause  your heart to work harder and beat faster. This can cause a heart attack if your heart can't handle this extra work. This kind of heart attack is known as an acute myocardial infarction (AMI).  · Lose any excess weight. The more extra pounds you have, the harder your heart has to work to pump blood through your body. Extra weight can also raise your risk for high blood pressure and diabetes. These diseases can further damage your blood vessels and heart.  · Don't drink alcohol. Drinking alcohol may make your cardiomyopathy worse. Alcohol breaks down the heart tissue. This affects how well your heart pumps. This can be very serious in people with alcoholism.  · Eat less salt. Salt is the main source of sodium in our diet. Too much sodium can make the symptoms of cardiomyopathy worse. Salt causes your body to retain water. This extra fluid makes your heart work harder. Your healthcare provider may tell you to limit how much sodium you have to to less than 1,500 mg a day. Thats about half a teaspoon of salt.  Keep track of your weight  Rapid weight gain may mean that you are retaining fluid. This is one of the signs of heart failure. Keeping track of your weight helps you notice this weight gain early and prevent further damage to your heart. To keep track of your weight:  · Weigh yourself at the same time each day, after you urinate. Wear the same thing each time. Write down your weight each day.  · Dont stop weighing yourself. If you forget one day, weigh again the next morning.  · Call your healthcare provider if you gain more than 2 pounds in 1 day, more than 5 pounds in 1 week, or whatever weight gain you were told to report by your provider.  Call your healthcare provider  Contact your healthcare provider if you:   · Faint or have dizzy spells  · Notice new symptoms from your medicine  · Have a new onset of coughing. This is especially true if the sputum is frothy or foamy.  · Have trouble breathing, especially  if it occurs while at rest or lying down. Or if you have trouble breathing during exercise or even while walking short distances.  · Get tired faster  · Begin urinating less often  · Find that your feet or ankles swell more than usual. Or if you have swelling in your legs or abdomen, or if the veins in your neck stick out more than usual. You may notice it is harder to get your shoes or pants because of swelling and bloating.  · Have tightness or pain in your chest, arm, jaw, or back  Date Last Reviewed: 2/1/2017  © 1863-7763 [employer]. [address] [place] [address], [place] [address]. All rights reserved. This information is not intended as a substitute for professional medical care. Always follow your healthcare professional's instructions.        Fall Prevention  Falls often occur due to slipping, tripping or losing your balance. Millions of people fall every year and injure themselves. Here are ways to reduce your risk of falling again.  · Think about your fall, was there anything that caused your fall that can be fixed, removed, or replaced?  · Make your home safe by keeping walkways clear of objects you may trip over.  · Use non-slip pads under rugs. Do not use area rugs or small throw rugs.  · Use non-slip mats in bathtubs and showers.  · Install handrails and lights on staircases.  · Do not walk in poorly lit areas.  · Do not stand on chairs or wobbly ladders.  · Use caution when reaching overhead or looking upward. This position can cause a loss of balance.  · Be sure your shoes fit properly, have non-slip bottoms and are in good condition.   · Wear shoes both inside and out. Avoid going barefoot or wearing slippers.  · Be cautious when going up and down stairs, curbs, and when walking on uneven sidewalks.  · If your balance is poor, consider using a cane or walker.  · If your fall was related to alcohol use, stop or limit alcohol intake.   · If your fall was related to use of sleeping medicines,  talk to your doctor about this. You may need to reduce your dosage at bedtime if you awaken during the night to go to the bathroom.    · To reduce the need for nighttime bathroom trips:  ¨ Avoid drinking fluids for several hours before going to bed  ¨ Empty your bladder before going to bed  ¨ Men can keep a urinal at the bedside  · Stay as active as you can. Balance, flexibility, strength, and endurance all come from exercise. They all play a role in preventing falls. Ask your healthcare provider which types of activity are right for you.  · Get your vision checked on a regular basis.  · If you have pets, know where they are before you stand up or walk so you don't trip over them.  · Use night lights.  Date Last Reviewed: 11/5/2015  © 4946-5311 [employer]. [address] [place] [address], [place] [address]. All rights reserved. This information is not intended as a substitute for professional medical care. Always follow your healthcare professional's instructions.

## 2020-10-06 ENCOUNTER — CARE AT HOME (OUTPATIENT)
Dept: HOME HEALTH SERVICES | Facility: CLINIC | Age: 85
End: 2020-10-06
Payer: COMMERCIAL

## 2020-10-06 DIAGNOSIS — M19.019 SHOULDER ARTHRITIS: ICD-10-CM

## 2020-10-06 DIAGNOSIS — I25.5 CARDIOMYOPATHY, ISCHEMIC: ICD-10-CM

## 2020-10-06 DIAGNOSIS — Z51.5 PALLIATIVE CARE ENCOUNTER: Primary | ICD-10-CM

## 2020-10-06 DIAGNOSIS — R41.3 MEMORY LOSS: ICD-10-CM

## 2020-10-06 PROCEDURE — 1125F AMNT PAIN NOTED PAIN PRSNT: CPT | Mod: S$GLB,,, | Performed by: NURSE PRACTITIONER

## 2020-10-06 PROCEDURE — 1101F PR PT FALLS ASSESS DOC 0-1 FALLS W/OUT INJ PAST YR: ICD-10-PCS | Mod: CPTII,S$GLB,, | Performed by: NURSE PRACTITIONER

## 2020-10-06 PROCEDURE — 1159F PR MEDICATION LIST DOCUMENTED IN MEDICAL RECORD: ICD-10-PCS | Mod: S$GLB,,, | Performed by: NURSE PRACTITIONER

## 2020-10-06 PROCEDURE — 1101F PT FALLS ASSESS-DOCD LE1/YR: CPT | Mod: CPTII,S$GLB,, | Performed by: NURSE PRACTITIONER

## 2020-10-06 PROCEDURE — 1159F MED LIST DOCD IN RCRD: CPT | Mod: S$GLB,,, | Performed by: NURSE PRACTITIONER

## 2020-10-06 PROCEDURE — 1125F PR PAIN SEVERITY QUANTIFIED, PAIN PRESENT: ICD-10-PCS | Mod: S$GLB,,, | Performed by: NURSE PRACTITIONER

## 2020-10-06 PROCEDURE — 99349 PR HOME VISIT,ESTAB PATIENT,LEVEL III: ICD-10-PCS | Mod: S$GLB,,, | Performed by: NURSE PRACTITIONER

## 2020-10-06 PROCEDURE — 99349 HOME/RES VST EST MOD MDM 40: CPT | Mod: S$GLB,,, | Performed by: NURSE PRACTITIONER

## 2020-10-06 NOTE — PROGRESS NOTES
"[facility]  Palliative Care Home Visit    Visit Date: 10/6/2020  Encounter Provider: Leida Brown NP  PCP:  Johny Ortiz MD    Subjective:      Patient ID: Kashmir Parikh is a 87 y.o. male.    Consult Requested By:  Leida Brown  Reason for Consult:  Palliative Care    Kashmir is being seen at home due to physical debility that presents a taxing effort to leave the home, to mitigate high risk of hospital readmission and/or due to the limited availability of reliable or safe options for transportation to the point of access to the provider. Prior to treatment on this visit the chart was reviewed and patient consent was obtained.        Chief Complaint: Follow-up, Memory Loss, and Arthritis    Kashmir is an 87 year old male with PMHX of CAD, h/o AVR replacement, MI and CABG, bilateral carotid artery stenosis, HTN, BPH, ischemic cardiomyopathy, gout, hyperlipidemia and memory loss.     Kashmir is being seen today for a follow up Palliative Care visit. He is found at home today ambulating without difficulty and is AAO x 3 with some forgetfulness. He is eating his breakfast and reports that his shoulders have been bothering him lately due to "bone on bone". He has seen Dr. Montesinos in the past. Encouraged to follow up.    Patient's son Pascual is also present today and reports that his dad's memory issues are still mild. He reports letting his dad drive every now and then a very short distance. Highly encouraged not to allow this, discussed safety concerns. Pascual voices understanding and states he will not let his dad drive.     VSS. Denies fever, chest pain, shortness of breath, nausea, vomiting, diarrhea. Denies any acute issues, concerns or complaints to address on today's visit. Reports taking all medications as prescribed. No other needs identified at this time. Risks of environmental exposure to coronavirus discussed including: social distancing, hand hygiene, and limiting departures from the home " "for necessities only.  Reports understanding and willingness to comply.         Goals of care discussed with Kashmir today. He states that he is would like to be a full code at this time. He wishes to stay in his home for the remainder of his life.        Review of Systems   Constitution: Negative for malaise/fatigue.   Eyes: Negative for blurred vision.   Cardiovascular: Negative for chest pain, claudication, cyanosis, dyspnea on exertion, irregular heartbeat, leg swelling, near-syncope, orthopnea, palpitations, paroxysmal nocturnal dyspnea and syncope.   Respiratory: Negative for cough and shortness of breath.    Hematologic/Lymphatic: Does not bruise/bleed easily.   Musculoskeletal: Negative for back pain, falls, joint pain, muscle cramps, muscle weakness and myalgias. Positive for chronic shoulder pain "bone on bone"  Gastrointestinal: Negative for abdominal pain, change in bowel habit, nausea and vomiting.   Genitourinary: Negative for urgency.   Neurological: Negative for dizziness, focal weakness and light-headedness. Positive for memory loss, mild.        Assessments:  · Environmental: single story home, lives with wife Crystal and son Pascual. Home is cluttered and not clean but with adequate lighting and temperature  · Functional Status: independent  · Safety: fall risk, 3 inside dogs  · Nutritional: has adequate access, reports "good" appetite  · Home Health/DME/Supplies: No Home Health. No DME.         History:  Past Medical History:   Diagnosis Date    Bilateral carotid artery disease 11/18/2016    BPH (benign prostatic hypertrophy)     CAD (coronary artery disease) 1/8/2013 4/2016 failed  of LAD 12/12 LAD-35%, OM 75%, small RCA    Cardiomyopathy, ischemic 10/20/2017    35%    Cataract     OU    CHF (congestive heart failure) 3/9/2016    Coronary artery disease     Diverticulitis     Gout, arthritis     HEARING LOSS     Heart murmur     asymptomatic    HTN (hypertension)     Pt states " resolved    Hyperlipidemia     Myocardial infarction     Recurrent nephrolithiasis     S/P AVR (aortic valve replacement) 1/8/2013    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 mosaic     S/P CABG x 1 1/8/2013 12/12 VG-OM      Family History   Problem Relation Age of Onset    Cancer Mother     Cancer Father      Past Surgical History:   Procedure Laterality Date    AORTIC VALVE REPLACEMENT  12/05/2012    #21 Medtronic Mosaic tissue valve    CARDIAC SURGERY      CARPAL TUNNEL RELEASE  06/06    bilateral    CHOLECYSTECTOMY      COLONOSCOPY      CORONARY ANGIOPLASTY WITH STENT PLACEMENT      EYE SURGERY      retinal detachment x 2    HAND SURGERY      HERNIA REPAIR      umbilical    JOINT REPLACEMENT      bilateral knee    RETINAL DETACHMENT SURGERY Bilateral 1980    RD spontaneous OU    ROTATOR CUFF REPAIR      bilateral    SPINAL CORD STIMULATOR IMPLANT  11/15/2011    SUBTOTAL COLECTOMY       Review of patient's allergies indicates:   Allergen Reactions    Toradol [ketorolac] Other (See Comments)     Renal failure    Pork/porcine containing products Other (See Comments)     gout    Shrimp Other (See Comments)     Gout       Medications:    Current Outpatient Medications:     acetaminophen (TYLENOL) 325 MG tablet, Take 1 tablet (325 mg total) by mouth every 6 (six) hours as needed for Pain., Disp: , Rfl:     allopurinoL (ZYLOPRIM) 100 MG tablet, TAKE 1 TABLET (100 MG TOTAL) BY MOUTH EVERY DAY TO PREVENT GOUT, Disp: 30 tablet, Rfl: 3    aspirin 81 MG Chew, Take 81 mg by mouth once daily., Disp: , Rfl:     atorvastatin (LIPITOR) 40 MG tablet, TAKE 1 TABLET BY MOUTH NIGHTLY, Disp: 90 tablet, Rfl: 4    azelastine (ASTELIN) 137 mcg (0.1 %) nasal spray, 1 spray (137 mcg total) by Nasal route 2 (two) times daily., Disp: 90 mL, Rfl: 3    benazepriL (LOTENSIN) 5 MG tablet, TAKE 1 TABLET BY MOUTH ONCE DAILY, Disp: 90 tablet, Rfl: 4    carvediloL (COREG) 3.125 MG tablet, TAKE 1 TABLET BY MOUTH TWO TIMES A DAY AS  DIRECTED, Disp: 180 tablet, Rfl: 4    clopidogreL (PLAVIX) 75 mg tablet, TAKE 1 TABLET BY MOUTH ONCE DAILY, Disp: 90 tablet, Rfl: 4    docusate sodium (COLACE) 100 MG capsule, Take 100 mg by mouth as needed for Constipation., Disp: , Rfl:     donepezil (ARICEPT) 10 MG tablet, Take 1 tablet (10 mg total) by mouth every evening., Disp: 30 tablet, Rfl: 11    fluticasone (FLONASE) 50 mcg/actuation nasal spray, 2 sprays (100 mcg total) by Each Nare route once daily., Disp: 1 Bottle, Rfl: 12    multivitamin capsule, Take 1 capsule by mouth once daily., Disp: , Rfl:     omega-3 fatty acids Cap, Take 1 capsule by mouth 2 (two) times daily. , Disp: , Rfl:     tamsulosin (FLOMAX) 0.4 mg Cap, TAKE 1 CAPSULE BY MOUTH ONCE DAILY AS DIRECTED, Disp: 90 capsule, Rfl: 3    traZODone (DESYREL) 100 MG tablet, TAKE 1 TABLET BY MOUTH NIGHTLY AS NEEDED FOR INSOMNIA, Disp: 90 tablet, Rfl: 3    COLCRYS 0.6 mg tablet, TAKE ONE TABLET BY MOUTH ONCE DAILY FOR GOUT (Patient not taking: Reported on 9/11/2020), Disp: 90 tablet, Rfl: 0    ketoconazole (NIZORAL) 2 % cream, aaa face twice daily prn scaling and redness (Patient not taking: Reported on 9/11/2020), Disp: 60 g, Rfl: 3    ketoconazole (NIZORAL) 2 % shampoo, Wash face/beard area with medicated shampoo 3x/week (Patient not taking: Reported on 10/21/2019), Disp: 120 mL, Rfl: 5    lactulose (CHRONULAC) 10 gram/15 mL solution, TAKE 30 MLS (20 GRAMS TOTAL) BY MOUTH ONCE DAILY (Patient not taking: Reported on 9/11/2020), Disp: 946 mL, Rfl: 5    oxyCODONE-acetaminophen (PERCOCET) 7.5-325 mg per tablet, Take 1 tablet by mouth every 12 (twelve) hours as needed for Pain. (Patient not taking: Reported on 9/11/2020), Disp: 30 tablet, Rfl: 0    predniSONE (DELTASONE) 5 MG tablet, TAKE 1 TABLET (5 MG TOTAL) BY MOUTH DAILY AS NEEDED FOR GOUTY FLARE (Patient not taking: Reported on 9/11/2020), Disp: 30 tablet, Rfl: 6    24h Oral Morphine Equivalents (OME):  N/A    Objective:     Physical  Exam:  Vitals:    10/06/20 1045   BP: 120/60   Pulse: 68   Resp: 16   Temp: 98.7 °F (37.1 °C)   TempSrc: Temporal   SpO2: 98%   Weight: 59 kg (130 lb)   PainSc:   4   PainLoc: Shoulder     Body mass index is 20.36 kg/m².    Physical Exam   Constitutional: He is oriented to person, place, and time. He appears well-developed and well-nourished. He is cooperative.   HENT: [place].  Head: Normocephalic and atraumatic.   Eyes: Conjunctivae are normal. Right eye exhibits no exudate. Left eye exhibits no exudate.   Neck: Normal range of motion. Neck supple. Normal carotid pulses and no JVD present. Carotid bruit is not present. No thyromegaly present.   Cardiovascular: Normal rate, regular rhythm, normal heart sounds and intact distal pulses.   Harsh midsystolic murmur is present at the upper right sternal border radiating to the neck.  Pulses:       Carotid pulses are 2+ on the right side, and 2+ on the left side.       Radial pulses are 2+ on the right side, and 2+ on the left side.        Dorsalis pedis pulses are 2+ on the right side, and 2+ on the left side.        Posterior tibial pulses are 2+ on the right side, and 2+ on the left side.   Well healed midline sternal incision.  Pulmonary/Chest: Effort normal and breath sounds normal.   Abdominal: Soft. Bowel sounds are normal.   Musculoskeletal: Normal range of motion. He exhibits no edema.   Neurological: He is alert and oriented to person, place, and time. Gait is slow but normal.   Skin: Skin is warm, dry and intact. No cyanosis. Nails show no clubbing.   Psychiatric: He has a normal mood and affect. His speech is normal and behavior is normal. Judgment and thought content normal.         Review of Symptoms     Symptom Assessment (ESAS 0-10 Scale)  Pain:  4  Dyspnea:  0  Anxiety:  0  Nausea:  0  Depression:  0  Anorexia:  0  Fatigue:  2  Insomnia:  0  Restlessness:  0  Agitation:  0      CAM / Delirium:  Negative  Constipation:  Negative  Diarrhea:   "Negative     Comments:  Reports has BM once every 2-3 days and this is his "normal" for many years.     Pain Assessment:  Location(s): back     Shoulders       Bilateral shoulders Location       Quality:  Aching       Quantity:  4/10 in intensity       Chronicity:  year(s) ago, unchanged       Aggravating Factors:  Activity       Alleviating Factors:  None       Associated Symptoms:  None     Performance Status:  60     ECOG Performance Status Grade:  1 - Ambulates, capable of light work     Living Arrangements:  Lives with spouse and Lives with family     Spiritual:  F - Nikia and Belief:  Orthodoxy  I - Importance:  Important  A - Address in Care:  No issues     Time-Based Charting:  Yes  Chart Review: 5 minutes  Face to Face: 20 minutes  Symptom Assessment: 10 minutes  Coordination of Care: 5 minutes  Goals of Care: 5 minutes    Total Time Spent: 45 minutes        Advance Care Planning   Advance Directives:   Living Will: No (forms left with patient and son today)    LaPOST: No    Do Not Resuscitate Status: No    Medical Power of : No (forms left with patient and son on last visit)       Decision Making:  Patient answered questions       Labs:  CBC:   WBC   Date Value Ref Range Status   03/11/2020 6.13 3.90 - 12.70 K/uL Final       Hemoglobin   Date Value Ref Range Status   03/11/2020 12.4 (L) 14.0 - 18.0 g/dL Final       Hematocrit   Date Value Ref Range Status   03/11/2020 40.9 40.0 - 54.0 % Final       Mean Corpuscular Volume   Date Value Ref Range Status   03/11/2020 100 (H) 82 - 98 fL Final       Platelets   Date Value Ref Range Status   03/11/2020 274 150 - 350 K/uL Final       LFT:   Lab Results   Component Value Date    AST 15 03/11/2020    ALKPHOS 96 03/11/2020    BILITOT 0.6 03/11/2020       Albumin:   Albumin   Date Value Ref Range Status   03/11/2020 3.0 (L) 3.5 - 5.2 g/dL Final     Protein:   Total Protein   Date Value Ref Range Status   03/11/2020 6.4 6.0 - 8.4 g/dL Final " "      Radiology:  I have reviewed all pertinent imaging results/findings within the past 24 hours.      Assessment:     1. Palliative care encounter    2. Cardiomyopathy, ischemic    3. Memory loss    4. Shoulder arthritis        Plan:   Kashmir was seen today for follow-up, memory loss and arthritis.    Diagnoses and all orders for this visit:    Palliative care encounter    Cardiomyopathy, ischemic  -     Ambulatory referral/consult to [facility]  Chronic and stable. Followed by PCP.   Memory loss  Active. Mild. Patient does not want to be on any "memory medicine".  Driving concerns and sfety discussed today.  Shoulder arthritis  Active. Encouraged follow up appointment with already established Ortho Dr. Montesinos. Son Pascual to make appointment for patient.     Were controlled substances prescribed?  No    > 50% of 45 min visit spent in chart review, face to face discussion of goals of care,  symptom assessment, coordination of care and emotional support. Topics discussed today: memory loss, arthritis, safety, fall precautions, cardiomyopathy.    Follow Up Appointments:   No future appointments.    Verbal consent for visit obtained from patient today.    Signature:  Leida Brown NP    Attestation:   Screening criteria to assess the level of the patient's risk for infection with COVID-19 as recommended by the CDC at the time of the above documented home visit concluded appropriateness to proceed. Universal precautions were maintained at all times, including provider wearing a mask and gloves during entire visit and use of 60% alcohol gel hand  immediately prior to entry and upon departure of patient's home as well as cleaning of equipment used in home visit with antibacterial/germicidal disposable wipes.    "

## 2020-10-06 NOTE — PATIENT INSTRUCTIONS
Patient Instructions:  - [facility] Nurse Practitioner to schedule home follow-up visit with patient in 6-8 weeks or as needed.  - Continue all medications, treatments and therapies as ordered.   - Follow all instructions, recommendations as discussed.  - Maintain Safety Precautions at all times.  - Attend all medical appointments as scheduled.  - For worsening symptoms: call Primary Care Physician or Nurse Practitioner.  - For emergencies, call 911 or immediately report to the nearest emergency room.  - Limit Risks of environmental exposure to coronavirus/COVID-19 as discussed including: social distancing, hand hygiene, and limiting departures from the home for necessities only.         Understanding Dementia  Dementia is the name for a group of brain conditions that make it harder to remember, reason, and communicate. The most common form of dementia is Alzheimer disease. Other types include vascular dementia, frontotemporal dementia, and Lewy body dementia. Years ago, dementia was often called senility. It was even thought to be a normal part of aging. We now know that its not normal. Its caused by ongoing damage to cells in the brain.    Symptoms of dementia  Symptoms differ depending on which parts of the brain are affected and the stage of the disease. The most common symptoms include:  · Memory loss, including trouble with directions and familiar tasks  · Language problems, such as trouble getting words out or understanding what is said  · Difficulty with planning, organizing, concentration, and judgment. This includes people not being able to recognize their own symptoms.  · Changes in behavior and personality  How dementia affects the brain  The brain controls all the workings of the mind and body. Some parts of the brain control memory and language. Other parts control movement and coordination. With dementia, nerve cells in the brain are gradually damaged or destroyed. Why this happens is not yet clear.  But over time, parts of the brain begin to atrophy (shrink). Brain atrophy often starts in the part of the brain that controls memory, reasoning, and personality. Other parts of the brain may not be affected until much later in the illness.  The stages of dementia  Dementia is a progressive disease. This means it gets worse over time. Symptoms differ for each person, but there are 3 basic stages. Each may last from months to years:  · In the early stage, a person may seem forgetful, confused, or have changes in behavior. However, he or she may still be able to handle most tasks without help.  · In the middle stage, more and more help is needed with daily tasks. A person may have trouble recognizing friends and family members, wander, or get lost in familiar places. He or she may also become restless or pérez.  · In the late stage, Alzheimers can cause severe problems with memory, judgment, and other skills. Help is needed with nearly every aspect of daily life.  Treating dementia  At present, theres no cure for dementia. But with proper care, many people can live comfortably for years:   · Medicines are a key part of treatment. Some types can help slow the progression of symptoms, such as memory loss. Others can help ease mood, behavior, and sleep problems. These medicines work for some people but not all.  · Activity and exercise are good for body and mind. They may even help slow the progression of the disease. Simple, repetitive activities are good choices.  · Regular healthcare provider visits help keep track of symptoms and overall health.  · Sleep-wake cycle can be mixed up in patients with dementia. They may function better being up at nighttime and sleeping during the daytime.    · Social interactions are important to maintain.    Date Last Reviewed: 10/7/2015  © 1689-2918 [employer]. [address] [place] [address], [place] [address]. All rights reserved. This information is not intended as a  substitute for professional medical care. Always follow your healthcare professional's instructions.        What is Arthritis?  Arthritis is a disease that affects the joints (the parts where bones meet and move). It can affect any joint in your body. There are many types of arthritis, including osteoarthritis and rheumatoid arthrtitis. If your symptoms are mild, medications may be enough to reduce pain and swelling. For more severe arthritis, surgery may be needed to improve the condition of the joint or replace the joint entirely.                  What causes arthritis?  Cartilage is a smooth substance that protects the ends of your bones and provides cushioning. When you have arthritis, this cartilage breaks down and can no longer protect your bones. The bones rub against each other, causing pain and swelling. Over time, bone spurs (small pieces of rough or splintered bone) may develop, and the joint's range of motion can become limited.  Symptoms  Some of the more common symptoms of arthritis include:  · Joint pain and stiffness. Pain and stiffness get worse with long periods of rest or using a joint too long or too hard.  · Joints that have lost normal shape and motion.  · Tender, inflamed joints. They may look red and feel warm.  · Grinding or popping noise with joint movement.   · Feeling tired all the time.  Reducing symptoms  Following a healthy lifestyle by losing weight and exercising can help reduce symptoms of osteoarthritis. Medicines can be very helpful for arthritis.     Date Last Reviewed: 9/10/2015  © 8488-5749 [employer]. [address] [place] [address], [place] [address]. All rights reserved. This information is not intended as a substitute for professional medical care. Always follow your healthcare professional's instructions.

## 2020-10-07 VITALS
BODY MASS INDEX: 20.36 KG/M2 | RESPIRATION RATE: 16 BRPM | OXYGEN SATURATION: 98 % | HEART RATE: 68 BPM | TEMPERATURE: 99 F | SYSTOLIC BLOOD PRESSURE: 120 MMHG | DIASTOLIC BLOOD PRESSURE: 60 MMHG | WEIGHT: 130 LBS

## 2020-10-07 PROBLEM — M19.019 SHOULDER ARTHRITIS: Status: ACTIVE | Noted: 2020-10-07

## 2020-11-16 PROBLEM — F43.21 GRIEF: Status: ACTIVE | Noted: 2020-11-16

## 2020-11-17 ENCOUNTER — CARE AT HOME (OUTPATIENT)
Dept: HOME HEALTH SERVICES | Facility: CLINIC | Age: 85
End: 2020-11-17

## 2020-11-17 VITALS
HEART RATE: 70 BPM | SYSTOLIC BLOOD PRESSURE: 140 MMHG | WEIGHT: 121 LBS | DIASTOLIC BLOOD PRESSURE: 70 MMHG | TEMPERATURE: 98 F | BODY MASS INDEX: 18.95 KG/M2 | OXYGEN SATURATION: 98 % | RESPIRATION RATE: 16 BRPM

## 2020-11-17 DIAGNOSIS — Z51.5 PALLIATIVE CARE ENCOUNTER: ICD-10-CM

## 2020-11-17 DIAGNOSIS — G31.84 MILD COGNITIVE IMPAIRMENT: ICD-10-CM

## 2020-11-17 DIAGNOSIS — R41.3 MEMORY LOSS: Primary | ICD-10-CM

## 2020-11-17 DIAGNOSIS — R53.1 WEAKNESS: ICD-10-CM

## 2020-11-17 DIAGNOSIS — R63.4 UNINTENTIONAL WEIGHT LOSS: ICD-10-CM

## 2020-11-17 DIAGNOSIS — I25.5 CARDIOMYOPATHY, ISCHEMIC: Chronic | ICD-10-CM

## 2020-11-17 DIAGNOSIS — M10.9 GOUT, ARTHRITIS: ICD-10-CM

## 2020-11-17 DIAGNOSIS — I10 ESSENTIAL HYPERTENSION: ICD-10-CM

## 2020-11-17 DIAGNOSIS — F43.21 GRIEF: ICD-10-CM

## 2020-11-17 DIAGNOSIS — I50.42 CHRONIC COMBINED SYSTOLIC AND DIASTOLIC CONGESTIVE HEART FAILURE: ICD-10-CM

## 2020-11-17 PROCEDURE — 99350 HOME/RES VST EST HIGH MDM 60: CPT | Mod: S$GLB,,, | Performed by: NURSE PRACTITIONER

## 2020-11-17 PROCEDURE — 99350 PR HOME VISIT,ESTAB PATIENT,LEVEL IV: ICD-10-PCS | Mod: S$GLB,,, | Performed by: NURSE PRACTITIONER

## 2020-11-17 NOTE — ASSESSMENT & PLAN NOTE
Chronic. Medications are in disarray in the home.  Not sure if patient is taking medications as prescribed.  Will order Home Health for education on medications/management.    Tuberculous pneumonia

## 2020-11-17 NOTE — ASSESSMENT & PLAN NOTE
Chronic. On Aricept.  Not sure if taking regularly, medications in disarray in the home.  Will order Home Health for help with medication management.

## 2020-11-17 NOTE — PATIENT INSTRUCTIONS
Patient Instructions:  - [facility] Nurse Practitioner to schedule home follow-up visit with patient 3-4 weeks as needed.  - Continue all medications, treatments and therapies as ordered.   - Follow all instructions, recommendations as discussed.  - Maintain Safety Precautions at all times.  - Attend all medical appointments as scheduled.  - For worsening symptoms: call Primary Care Physician or Nurse Practitioner.  - For emergencies, call 911 or immediately report to the nearest emergency room.  - Limit Risks of environmental exposure to coronavirus/COVID-19 as discussed including: social distancing, hand hygiene, and limiting departures from the home for necessities only.       Low-Salt Diet  This diet removes foods that are high in salt. It also limits the amount of salt you use when cooking. It is most often used for people with high blood pressure, edema (fluid retention), and kidney, liver, or heart disease.  Table salt contains the mineral sodium. Your body needs sodium to work normally. But too much sodium can make your health problems worse. Your healthcare provider is recommending a low-salt (also called low-sodium) diet for you. Your total daily allowance of salt is 1,500 to 2,300 milligrams (mg). It is less than 1 teaspoon of table salt. This means you can have only about 500 to 700 mg of sodium at each meal. People with certain health problems should limit salt intake to the lower end of the recommended range.    When you cook, dont add much salt. If you can cook without using salt, even better. Dont add salt to your food at the table.  When shopping, read food labels. Salt is often called sodium on the label. Choose foods that are salt-free, low salt, or very low salt. Note that foods with reduced salt may not lower your salt intake enough.    Beans, potatoes, and pasta  Ok: Dry beans, split peas, lentils, potatoes, rice, macaroni, pasta, spaghetti without added salt  Avoid: Potato chips, tortilla  chips, and similar products  Breads and cereals  Ok: Low-sodium breads, rolls, cereals, and cakes; low-salt crackers, matzo crackers  Avoid: Salted crackers, pretzels, popcorn, Lao toast, pancakes, muffins  Dairy  Ok: Milk, chocolate milk, hot chocolate mix, low-salt cheeses, and yogurt  Avoid: Processed cheese and cheese spreads; Roquefort, Camembert, and cottage cheese; buttermilk, instant breakfast drink  Desserts  Ok: Ice cream, frozen yogurt, juice bars, gelatin, cookies and pies, sugar, honey, jelly, hard candy  Avoid: Most pies, cakes and cookies prepared or processed with salt; instant pudding  Drinks  Ok: Tea, coffee, fizzy (carbonated) drinks, juices  Avoid: Flavored coffees, electrolyte replacement drinks, sports drinks  Meats  Ok: All fresh meat, fish, poultry, low-salt tuna, eggs, egg substitute  Avoid: Smoked, pickled, brine-cured, or salted meats and fish. This includes barrios, chipped beef, corned beef, hot dogs, deli meats, ham, kosher meats, salt pork, sausage, canned tuna, salted codfish, smoked salmon, herring, sardines, or anchovies.  Seasonings and spices  Ok: Most seasonings are okay. Good substitutes for salt include: fresh herb blends, hot sauce, lemon, garlic, bhat, vinegar, dry mustard, parsley, cilantro, horseradish, tomato paste, regular margarine, mayonnaise, unsalted butter, cream cheese, vegetable oil, cream, low-salt salad dressing and gravy.  Avoid: Regular ketchup, relishes, pickles, soy sauce, teriyaki sauce, Worcestershire sauce, BBQ sauce, tartar sauce, meat tenderizer, chili sauce, regular gravy, regular salad dressing, salted butter  Soups  Ok: Low-salt soups and broths made with allowed foods  Avoid: Bouillon cubes, soups with smoked or salted meats, regular soup and broth  Vegetables  Ok: Most vegetables are okay; also low-salt tomato and vegetable juices  Avoid: Sauerkraut and other brine-soaked vegetables; pickles and other pickled vegetables; tomato juice, olives  Date  Last Reviewed: 8/1/2016  © 6470-7594 [employer]. [address] [place] [address], [place] [address]. All rights reserved. This information is not intended as a substitute for professional medical care. Always follow your healthcare professional's instructions.        Fall Prevention  Falls often occur due to slipping, tripping or losing your balance. Millions of people fall every year and injure themselves. Here are ways to reduce your risk of falling again.  · Think about your fall, was there anything that caused your fall that can be fixed, removed, or replaced?  · Make your home safe by keeping walkways clear of objects you may trip over.  · Use non-slip pads under rugs. Do not use area rugs or small throw rugs.  · Use non-slip mats in bathtubs and showers.  · Install handrails and lights on staircases.  · Do not walk in poorly lit areas.  · Do not stand on chairs or wobbly ladders.  · Use caution when reaching overhead or looking upward. This position can cause a loss of balance.  · Be sure your shoes fit properly, have non-slip bottoms and are in good condition.   · Wear shoes both inside and out. Avoid going barefoot or wearing slippers.  · Be cautious when going up and down stairs, curbs, and when walking on uneven sidewalks.  · If your balance is poor, consider using a cane or walker.  · If your fall was related to alcohol use, stop or limit alcohol intake.   · If your fall was related to use of sleeping medicines, talk to your doctor about this. You may need to reduce your dosage at bedtime if you awaken during the night to go to the bathroom.    · To reduce the need for nighttime bathroom trips:  ¨ Avoid drinking fluids for several hours before going to bed  ¨ Empty your bladder before going to bed  ¨ Men can keep a urinal at the bedside  · Stay as active as you can. Balance, flexibility, strength, and endurance all come from exercise. They all play a role in preventing falls. Ask your healthcare  provider which types of activity are right for you.  · Get your vision checked on a regular basis.  · If you have pets, know where they are before you stand up or walk so you don't trip over them.  · Use night lights.  Date Last Reviewed: 11/5/2015  © 8463-8828 [employer]. [address] [place] [address]. All rights reserved. This information is not intended as a substitute for professional medical care. Always follow your healthcare professional's instructions.        Grief and Loss  Losing someone you care about is painful. Grief is the emotional reaction that follows. Its a normal process, with both physical and emotional signs. But even with major life changes, such as the loss of a spouse or parent, you can face the loss and move on.    Grief takes many forms  Each person will have his or her own grieving process. Grief may or may not occur in predictable stages. Or, it may bounce between stages. But, in time, grief will gradually lead you towards acceptance of the loss. Many people are able to continue normal daily activities even with bouts of grief.  Common grief reactions include:  · Not want to believe the loss is real  · Emotional numbness, shock  · Feel annoyed or outright angry  · Think you could have done something to stop the loss  · Have sad moods and feel hopeless  · Loss of appetite, sleep  and loss of interest in things you used to enjoy  Normal grief typically does not need to be treated. Chronic or blunted grief may require treatment with talk therapy. Serious grief reactions or depression related to grief may require treatment. If you think you have grief-related depression, talk to your doctor about whether you need treatment.     Date Last Reviewed: 11/3/2015  © 6660-2327 [employer]. [address] [place] [address]. All rights reserved. This information is not intended as a substitute for professional medical care. Always follow your  healthcare professional's instructions.        Be Involved in Your Health Care: Taking Medicines    When medicines are taken as directed, they can greatly improve your health. But if they are not taken as instructed, they may not work. In some cases, not taking them correctly can be harmful. To help make sure that your treatment remains effective and safe, understand your medicines and how to take them.  Reviewing your medicines  Medicines can interact with each other. They can also interact with herbal remedies and supplements. In either case, it can be harmful to your health. This is why your healthcare provider needs to know about every medicine, herb, and supplement that you take. Schedule a visit with your healthcare provider or pharmacist to discuss all your medicines. When you go in for your visit, have either one of the following:  · A bag filled with bottles of all your medicines. Include all prescription and over-the-counter medicines. Also include any vitamins and herbal remedies that you take. Keep medicines in their labeled bottles.  OR  · A list of all the medicines, vitamins, herbs, and supplements that you take. Be sure to list how much you take and how often you take it.  Your healthcare provider or pharmacist will review your medicines with you. He or she can decide whether you are at risk for any medicine interactions. Depending on what you take, your prescriptions may be adjusted.  Remembering to take medicines  Remembering to take medicines on time can be hard. Here are some tips to help you:  · Develop a routine. For example, take your medicine at the same time each day, such as after you brush your teeth. This helps remind you to take it.  · Schedule reminders on your computer, PDA, watch, or cell phone.  · If you take more than one medicine, use a pillbox. Get one that lists the days of the week. If you take pills more than once a day, get a pillbox that has spaces for morning, noon, and  evening. As you fill the pillbox, have the bottles with labels in front of you. This helps you avoid confusing pills that look alike.  · Keep your medicine routine when youre away from home. When traveling, make sure you bring enough for your entire trip. When traveling by air, keep your medicines in your carry-on. Be sure you have your prescription labels (either the original package or a photocopy). To learn more about traveling with medicines, visit: www.[employer].gov.  Working with your healthcare provider  Follow up with your healthcare provider. This lets him or her see how well you are doing on the medicine. It may take a few tries to find the right dosage, medicine, or combination of medicines for you. Make sure you talk with your healthcare provider about the medicines you take and how they make you feel. And never stop taking a medicine without talking to your healthcare provider first. Some medicines cannot be stopped suddenly. Others can cause problems if they are not taken for the prescribed amount of time.  Taking medicine safely  Below are some tips for taking medicine safely. If you have any questions about your medicines, call your healthcare provider or pharmacist:  · Make sure to refill your prescription while you still have some left, so you dont run out. Ask your healthcare provider for an extra written prescription in case of an emergency. If you use mail order, be sure to order with enough time for the refill to arrive while you still have some left.  · Be sure refills are correct before taking them.  · Hold on to the instructions that come with each medicine.  · Dont take less than prescribed to save money. Talk to your healthcare provider if you cant afford your medicines. There are choices that can help you.  · Never share medicines.  · Store medicines in a cool, dry, dark place. A steamy bathroom is often not the best place.  Date Last Reviewed: 2/13/2016  © 0842-8683 [employer] [address] [place] [address]. All rights reserved. This information is not intended as a substitute for professional medical care. Always follow your healthcare professional's instructions.

## 2020-11-17 NOTE — ASSESSMENT & PLAN NOTE
Full code. No ACP documents in place although discussed on previous visit and left with patient to review and complete. Encouraged to complete forms and allowed time for questions.

## 2020-11-17 NOTE — PROGRESS NOTES
"[facility]  Palliative Care Home Visit    Visit Date: 11/17/2020  Encounter Provider: Leida Brown NP  PCP:  Johny Ortiz MD    Subjective:      Patient ID: Kashmir Parikh is a 88 y.o. male.    Consult Requested By:  No ref. provider found  Reason for Consult:  Palliative Care    Kashmir is being seen at home due to  physical debility that presents a taxing effort to leave the home, to mitigate high risk of hospital readmission and/or due to the limited availability of reliable or safe options for transportation to the point of access to the provider. Prior to treatment on this visit the chart was reviewed and patient consent was obtained.        Chief Complaint: Weakness    Kahsmir is an 88 year old male with PMHX of CAD, h/o AVR replacement, MI and CABG, bilateral carotid artery stenosis, HTN, BPH, ischemic cardiomyopathy, gout, hyperlipidemia and memory loss.    Kashmir is being seen today for a follow up Palliative Care visit. He is found at home today lying in bed. He states he "slept in this morning". His son Pascual greeted me at the door to let me know that patient's spouse Crystal passed away on hospice care 10/26/2020. He thinks his dad is "grieving ok". He does report that his dad seems more forgetful since Mrs. Rojas passed away. Pascual states that his dad manages his own medications despite being forgetful. Medication bottles on dresser are all in disarray with no organization and patient has difficulty telling me if he is in fact taking some of the medications. No recent falls are reported but patient does self report increased weakness. He uses no assistance with ambulation.    Mr. Hardy gets tearful at times discussed his wife's passing but reports he knew she "was ready to go". Patient appears thinner than on last visit and states his appetite is "fair".   Weight on 10/6/2020 visit was 130 lbs.  Weight on 11/17/2020 visit is 121 lbs.  Pascual lives with patient and states that he does get " "food from the restaurant next door to them daily but sometimes "Dad just doesn't want to eat. I can't make him eat if he doesn't want to". Patient denies nausea, vomiting.     Gene continues to reports right shoulder gout arthritis pain. He reports taking allopurinol daily but that he still eats shrimp and other seafood regularly. He does not follow a low salt diet.     VSS. Denies fever, chest pain, shortness of breath, nausea, vomiting, diarrhea. Denies any acute issues, concerns or complaints to address on today's visit. Reports taking all medications as prescribed. No other needs identified at this time. Risks of environmental exposure to coronavirus discussed including: social distancing, hand hygiene, and limiting departures from the home for necessities only.  Reports understanding and willingness to comply.         Goals of care discussed with Kashmir today. He states that he is would like to be a full code at this time. He wishes to stay in his home for the remainder of his life.         Review of Systems   Constitution: Negative for malaise/fatigue.   Eyes: Negative for blurred vision.   Cardiovascular: Negative for chest pain, claudication, cyanosis, dyspnea on exertion, irregular heartbeat, leg swelling, near-syncope, orthopnea, palpitations, paroxysmal nocturnal dyspnea and syncope.   Respiratory: Negative for cough and shortness of breath.    Hematologic/Lymphatic: Does not bruise/bleed easily.   Musculoskeletal: Negative for back pain, falls, joint pain, muscle cramps and myalgias. Positive for chronic shoulder pain "bone on bone" Positive for generalized weakness.   Gastrointestinal: Negative for abdominal pain, change in bowel habit, nausea and vomiting.   Genitourinary: Negative for urgency.   Neurological: Negative for dizziness, focal weakness and light-headedness. Positive for memory loss, mild.         Assessments:  · Environmental: single story home, son Pascual lives with patient. Home is " "cluttered and not clean but with adequate lighting and temperature  · Functional Status: independent  · Safety: fall risk, 3 inside dogs  · Nutritional: has adequate access, reports "fair" appetite  · Home Health/DME/Supplies: No Home Health. No DME.      History:  Past Medical History:   Diagnosis Date    Bilateral carotid artery disease 11/18/2016    BPH (benign prostatic hypertrophy)     CAD (coronary artery disease) 1/8/2013 4/2016 failed  of LAD 12/12 LAD-35%, OM 75%, small RCA    Cardiomyopathy, ischemic 10/20/2017    35%    Cataract     OU    CHF (congestive heart failure) 3/9/2016    Coronary artery disease     Diverticulitis     Gout, arthritis     HEARING LOSS     Heart murmur     asymptomatic    HTN (hypertension)     Pt states resolved    Hyperlipidemia     Myocardial infarction     Recurrent nephrolithiasis     S/P AVR (aortic valve replacement) 1/8/2013    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 mosaic     S/P CABG x 1 1/8/2013 12/12 VG-OM      Family History   Problem Relation Age of Onset    Cancer Mother     Cancer Father      Past Surgical History:   Procedure Laterality Date    AORTIC VALVE REPLACEMENT  12/05/2012    #21 Medtronic Mosaic tissue valve    CARDIAC SURGERY      CARPAL TUNNEL RELEASE  06/06    bilateral    CHOLECYSTECTOMY      COLONOSCOPY      CORONARY ANGIOPLASTY WITH STENT PLACEMENT      EYE SURGERY      retinal detachment x 2    HAND SURGERY      HERNIA REPAIR      umbilical    JOINT REPLACEMENT      bilateral knee    RETINAL DETACHMENT SURGERY Bilateral 1980    RD spontaneous OU    ROTATOR CUFF REPAIR      bilateral    SPINAL CORD STIMULATOR IMPLANT  11/15/2011    SUBTOTAL COLECTOMY       Review of patient's allergies indicates:   Allergen Reactions    Toradol [ketorolac] Other (See Comments)     Renal failure    Pork/porcine containing products Other (See Comments)     gout    Shrimp Other (See Comments)     Gout       Medications:    Current Outpatient " Medications:     acetaminophen (TYLENOL) 325 MG tablet, Take 1 tablet (325 mg total) by mouth every 6 (six) hours as needed for Pain., Disp: , Rfl:     allopurinoL (ZYLOPRIM) 100 MG tablet, TAKE 1 TABLET (100 MG TOTAL) BY MOUTH EVERY DAY TO PREVENT GOUT, Disp: 30 tablet, Rfl: 3    aspirin 81 MG Chew, Take 81 mg by mouth once daily., Disp: , Rfl:     atorvastatin (LIPITOR) 40 MG tablet, TAKE 1 TABLET BY MOUTH NIGHTLY, Disp: 90 tablet, Rfl: 4    carvediloL (COREG) 3.125 MG tablet, TAKE 1 TABLET BY MOUTH TWO TIMES A DAY AS DIRECTED, Disp: 180 tablet, Rfl: 4    clopidogreL (PLAVIX) 75 mg tablet, TAKE 1 TABLET BY MOUTH ONCE DAILY, Disp: 90 tablet, Rfl: 4    docusate sodium (COLACE) 100 MG capsule, Take 100 mg by mouth as needed for Constipation., Disp: , Rfl:     multivitamin capsule, Take 1 capsule by mouth once daily., Disp: , Rfl:     omega-3 fatty acids Cap, Take 1 capsule by mouth 2 (two) times daily. , Disp: , Rfl:     predniSONE (DELTASONE) 5 MG tablet, TAKE 1 TABLET (5 MG TOTAL) BY MOUTH DAILY AS NEEDED FOR GOUTY FLARE, Disp: 30 tablet, Rfl: 6    tamsulosin (FLOMAX) 0.4 mg Cap, TAKE 1 CAPSULE BY MOUTH ONCE DAILY AS DIRECTED, Disp: 90 capsule, Rfl: 3    traZODone (DESYREL) 100 MG tablet, TAKE 1 TABLET BY MOUTH NIGHTLY AS NEEDED FOR INSOMNIA, Disp: 90 tablet, Rfl: 3    azelastine (ASTELIN) 137 mcg (0.1 %) nasal spray, 1 spray (137 mcg total) by Nasal route 2 (two) times daily. (Patient not taking: Reported on 11/17/2020), Disp: 90 mL, Rfl: 3    benazepriL (LOTENSIN) 5 MG tablet, TAKE 1 TABLET BY MOUTH ONCE DAILY (Patient not taking: Reported on 11/17/2020), Disp: 90 tablet, Rfl: 4    COLCRYS 0.6 mg tablet, TAKE ONE TABLET BY MOUTH ONCE DAILY FOR GOUT (Patient not taking: Reported on 9/11/2020), Disp: 90 tablet, Rfl: 0    donepezil (ARICEPT) 10 MG tablet, Take 1 tablet (10 mg total) by mouth every evening., Disp: 30 tablet, Rfl: 11    fluticasone (FLONASE) 50 mcg/actuation nasal spray, 2 sprays (100  mcg total) by Each Nare route once daily. (Patient not taking: Reported on 11/17/2020), Disp: 1 Bottle, Rfl: 12    ketoconazole (NIZORAL) 2 % cream, aaa face twice daily prn scaling and redness (Patient not taking: Reported on 9/11/2020), Disp: 60 g, Rfl: 3    ketoconazole (NIZORAL) 2 % shampoo, Wash face/beard area with medicated shampoo 3x/week (Patient not taking: Reported on 10/21/2019), Disp: 120 mL, Rfl: 5    lactulose (CHRONULAC) 10 gram/15 mL solution, TAKE 30 MLS (20 GRAMS TOTAL) BY MOUTH ONCE DAILY (Patient not taking: Reported on 9/11/2020), Disp: 946 mL, Rfl: 5    oxyCODONE-acetaminophen (PERCOCET) 7.5-325 mg per tablet, Take 1 tablet by mouth every 12 (twelve) hours as needed for Pain. (Patient not taking: Reported on 9/11/2020), Disp: 30 tablet, Rfl: 0    24h Oral Morphine Equivalents (OME):  N/A    Objective:     Physical Exam:  Vitals:    11/17/20 1010   BP: (!) 140/70   Pulse: 70   Resp: 16   Temp: 98.1 °F (36.7 °C)   TempSrc: Temporal   SpO2: 98%   Weight: 54.9 kg (121 lb)   PainSc:   4   PainLoc: Shoulder     Body mass index is 18.95 kg/m².    Physical Exam   Constitutional: He is oriented to person, place, and time. He appears well-developed although thin and pale. He is cooperative.   HENT: [place].  Head: Normocephalic and atraumatic.   Eyes: Conjunctivae are normal. Right eye exhibits no exudate. Left eye exhibits no exudate.   Neck: Normal range of motion. Neck supple. Normal carotid pulses and no JVD present. Carotid bruit is not present. No thyromegaly present.   Cardiovascular: Normal rate, regular rhythm, normal heart sounds and intact distal pulses.   Harsh midsystolic murmur is present at the upper right sternal border radiating to the neck.  Pulses:       Carotid pulses are 2+ on the right side, and 2+ on the left side.       Radial pulses are 2+ on the right side, and 2+ on the left side.        Dorsalis pedis pulses are 2+ on the right side, and 2+ on the left side.        Posterior  "tibial pulses are 2+ on the right side, and 2+ on the left side.   Well healed midline sternal incision.  Pulmonary/Chest: Effort normal and breath sounds normal.   Abdominal: Soft. Bowel sounds are normal.   Musculoskeletal: Normal range of motion. He exhibits no edema. Generalized weakness.  Neurological: He is alert and oriented to person, place, and time. Gait is slow but normal.   Skin: Skin is warm, dry and intact. No cyanosis. Nails show no clubbing.   Psychiatric: He has a normal mood and affect. His speech is normal and behavior is normal. Judgment and thought content normal mostly but is forgetful frequently. Tearful at times discussing spouse's recent passing away.        Review of Symptoms     Symptom Assessment (ESAS 0-10 Scale)  Pain:  4  Dyspnea:  0  Anxiety:  0  Nausea:  0  Depression:  0  Anorexia:  0  Fatigue:  2  Insomnia:  0  Restlessness:  0  Agitation:  0      CAM / Delirium:  Negative  Constipation:  Negative  Diarrhea:  Negative     Comments:  Reports has BM once every 2-3 days and this is his "normal" for many years.     Pain Assessment:  Location(s): back     Shoulders       Bilateral shoulders Location       Quality:  Aching       Quantity:  4/10 in intensity       Chronicity:  year(s) ago, unchanged       Aggravating Factors:  Activity       Alleviating Factors:  None       Associated Symptoms:  None     Performance Status:  60     ECOG Performance Status Grade:  1 - Ambulates, capable of light work     Living Arrangements:  Adult son, Pascual Kati, lives with patient     Spiritual:  F - Nikia and Belief:  Adventism  I - Importance:  Important  A - Address in Care:  No issues     Time-Based Charting:  Yes  Chart Review: 5 minutes  Face to Face: 30 minutes  Symptom Assessment: 15 minutes  Coordination of Care: 5 minutes  Goals of Care: 5 minutes    Total Time Spent: 60 minutes        Advance Care Planning   Advance Directives:   Living Will: No (forms left with patient and son " today)    LaPOST: No    Do Not Resuscitate Status: No    Medical Power of : No (forms left with patient and son on last visit)       Decision Making:  Patient answered questions       Labs:  CBC:   WBC   Date Value Ref Range Status   03/11/2020 6.13 3.90 - 12.70 K/uL Final     MCV   Date Value Ref Range Status   03/11/2020 100 (H) 82 - 98 fL Final          Hematocrit   Date Value Ref Range Status   03/11/2020 40.9 40.0 - 54.0 % Final                        Albumin:   Albumin   Date Value Ref Range Status   03/11/2020 3.0 (L) 3.5 - 5.2 g/dL Final     Protein:   Total Protein   Date Value Ref Range Status   03/11/2020 6.4 6.0 - 8.4 g/dL Final       Radiology:  I have reviewed all pertinent imaging results/findings within the past 24 hours.      Assessment:     1. Memory loss    2. Cardiomyopathy, ischemic    3. Palliative care encounter    4. Gout, arthritis    5. Essential hypertension    6. Chronic combined systolic and diastolic congestive heart failure    7. Grief        Plan:   Kashmir was seen today for weakness.    Diagnoses and all orders for this visit:    Memory loss    Cardiomyopathy, ischemic    Palliative care encounter    Gout, arthritis    Essential hypertension    Chronic combined systolic and diastolic congestive heart failure    Grief    Weakness    Unintentional weight loss      Problem List Items Addressed This Visit        Neuro    Memory loss - Primary     Chronic. On Aricept.  Not sure if taking regularly, medications in disarray in the home.  Will order Home Health for help with medication management.             Psychiatric    Grief     Appropriate grief observed.  Spouse passed away on Hospice 10/26/2020.  Adult son Pascual Kati lives with patient.            Cardiac/Vascular    Cardiomyopathy, ischemic (Chronic)     Stable. Followed by Cardiology.          Chronic congestive heart failure     Chronic.   Weigh daily and record.  Regular activity within patient's limitations.  Low  "salt, low fat and low choleterol diet and restrict fluid < 2L per day.  Contact for SOB, chest pain, weight gain > 2-3 lbs per day and/or 5-6 lbs per week.   No smoking. Annual influenza vaccine required. Take all medications as prescribed.  Reinforced importance of medication and diet compliance.           Essential hypertension     Chronic. Medications are in disarray in the home.  Not sure if patient is taking medications as prescribed.  Will order Home Health for education on medications/management.             Endocrine    Unintentional weight loss     Active. Weight on 10/6/2020 visit was 130 lbs.  Weight on 11/17/2020 visit is 121 lbs.  Patient's spouse passed away 10/26/2020.  Adult son lives with patient and states that he does get food from the restaurant next door to them daily but sometimes "Dad just doesn't want to eat".             Orthopedic    Gout, arthritis     Reports right shoulder arthritic pain daily.  Discussed low purine diet.             Other    Palliative care encounter     Full code. No ACP documents in place although discussed on previous visit and left with patient to review and complete. Encouraged to complete forms and allowed time for questions.          Weakness     Fall precautions and safety advised.               Were controlled substances prescribed?  No    > 50% of 60 min visit spent in chart review, face to face discussion of goals of care,  symptom assessment, coordination of care and emotional support. Topics discussed today: grief, memory loss, fall precautions and safety, weakness, hypertension, CHF, weight loss, medication management.     Follow Up Appointments:   No future appointments.Verbal consent obtained from patient today for visit.    Signature:  Leida Brown NP     Attestation:   Screening criteria to assess the level of the patient's risk for infection with COVID-19 as recommended by the CDC at the time of the above documented home visit concluded " appropriateness to proceed. Universal precautions were maintained at all times, including provider wearing a mask and gloves during entire visit and use of 60% alcohol gel hand  immediately prior to entry and upon departure of patient's home as well as cleaning of equipment used in home visit with antibacterial/germicidal disposable wipes.

## 2020-11-17 NOTE — PROGRESS NOTES
Refill Routing Note   Medication(s) are not appropriate for processing by [facility] for the following reason(s):     - Outside of Protocol  [facility] action(s):   Route          Medication reconciliation completed: No   Automatic Epic Generated Protocol Data:        Requested Prescriptions   Pending Prescriptions Disp Refills    donepeziL (ARICEPT) 10 MG tablet [Pharmacy Med Name: DONEPEZIL HCL 10 MG TABS 10 Tablet] 30 tablet 11     Sig: TAKE 1 TABLET (10 MG TOTAL) BY MOUTH EVERY EVENING       There is no refill protocol information for this order           Appointments  past 12m or future 3m with PCP    Date Provider   Last Visit   2/14/2020 Johny Ortiz MD   Next Visit   Visit date not found Johny Ortiz MD   ED visits in past 90 days: 0        Note composed:5:56 PM 11/17/2020

## 2020-11-17 NOTE — ASSESSMENT & PLAN NOTE
"Active. Weight on 10/6/2020 visit was 130 lbs.  Weight on 11/17/2020 visit is 121 lbs.  Patient's spouse passed away 10/26/2020.  Adult son lives with patient and states that he does get food from the restaurant next door to them daily but sometimes "Dad just doesn't want to eat".   "

## 2020-11-17 NOTE — ASSESSMENT & PLAN NOTE
Appropriate grief observed.  Spouse passed away on Hospice 10/26/2020.  Adult son Pascual Kati lives with patient.

## 2020-11-17 NOTE — ASSESSMENT & PLAN NOTE
Chronic.   Weigh daily and record.  Regular activity within patient's limitations.  Low salt, low fat and low choleterol diet and restrict fluid < 2L per day.  Contact for SOB, chest pain, weight gain > 2-3 lbs per day and/or 5-6 lbs per week.   No smoking. Annual influenza vaccine required. Take all medications as prescribed.  Reinforced importance of medication and diet compliance.

## 2020-11-18 ENCOUNTER — TELEPHONE (OUTPATIENT)
Dept: FAMILY MEDICINE | Facility: CLINIC | Age: 85
End: 2020-11-18

## 2020-11-18 NOTE — TELEPHONE ENCOUNTER
Spoke with Sveta at [facility] with  insurance info. She will look further into it

## 2020-11-18 NOTE — TELEPHONE ENCOUNTER
----- Message from Kelli Veronica sent at 11/17/2020  3:30 PM CST -----  Contact: Sveta  Type: Needs Medical Advice  Who Called:  Brittany with [facility]  Symptoms (please be specific):    How long has patient had these symptoms:    Pharmacy name and phone #:    Best Call Back Number:   Additional Information: requesting  a call back regarding patient insurance information

## 2020-11-19 RX ORDER — DONEPEZIL HYDROCHLORIDE 10 MG/1
10 TABLET, FILM COATED ORAL NIGHTLY
Qty: 30 TABLET | Refills: 3 | Status: SHIPPED | OUTPATIENT
Start: 2020-11-19 | End: 2021-03-12 | Stop reason: SDUPTHER

## 2020-12-11 ENCOUNTER — LAB VISIT (OUTPATIENT)
Dept: LAB | Facility: HOSPITAL | Age: 85
End: 2020-12-11
Attending: FAMILY MEDICINE
Payer: MEDICARE

## 2020-12-11 DIAGNOSIS — I50.9 HEART FAILURE, UNSPECIFIED: Primary | ICD-10-CM

## 2020-12-11 LAB
ALBUMIN SERPL BCP-MCNC: 2.8 G/DL (ref 3.5–5.2)
ALP SERPL-CCNC: 97 U/L (ref 55–135)
ALT SERPL W/O P-5'-P-CCNC: 15 U/L (ref 10–44)
ANION GAP SERPL CALC-SCNC: 8 MMOL/L (ref 8–16)
AST SERPL-CCNC: 13 U/L (ref 10–40)
BASOPHILS # BLD AUTO: 0.01 K/UL (ref 0–0.2)
BASOPHILS NFR BLD: 0.2 % (ref 0–1.9)
BILIRUB SERPL-MCNC: 0.4 MG/DL (ref 0.1–1)
BUN SERPL-MCNC: 20 MG/DL (ref 8–23)
CALCIUM SERPL-MCNC: 8.9 MG/DL (ref 8.7–10.5)
CHLORIDE SERPL-SCNC: 106 MMOL/L (ref 95–110)
CO2 SERPL-SCNC: 27 MMOL/L (ref 23–29)
CREAT SERPL-MCNC: 0.8 MG/DL (ref 0.5–1.4)
DIFFERENTIAL METHOD: ABNORMAL
EOSINOPHIL # BLD AUTO: 0 K/UL (ref 0–0.5)
EOSINOPHIL NFR BLD: 1 % (ref 0–8)
ERYTHROCYTE [DISTWIDTH] IN BLOOD BY AUTOMATED COUNT: 13.3 % (ref 11.5–14.5)
EST. GFR  (AFRICAN AMERICAN): >60 ML/MIN/1.73 M^2
EST. GFR  (NON AFRICAN AMERICAN): >60 ML/MIN/1.73 M^2
GLUCOSE SERPL-MCNC: 103 MG/DL (ref 70–110)
HCT VFR BLD AUTO: 35.6 % (ref 40–54)
HGB BLD-MCNC: 11.1 G/DL (ref 14–18)
IMM GRANULOCYTES # BLD AUTO: 0.02 K/UL (ref 0–0.04)
IMM GRANULOCYTES NFR BLD AUTO: 0.5 % (ref 0–0.5)
LYMPHOCYTES # BLD AUTO: 1.4 K/UL (ref 1–4.8)
LYMPHOCYTES NFR BLD: 34.7 % (ref 18–48)
MCH RBC QN AUTO: 30.6 PG (ref 27–31)
MCHC RBC AUTO-ENTMCNC: 31.2 G/DL (ref 32–36)
MCV RBC AUTO: 98 FL (ref 82–98)
MONOCYTES # BLD AUTO: 0.5 K/UL (ref 0.3–1)
MONOCYTES NFR BLD: 12.5 % (ref 4–15)
NEUTROPHILS # BLD AUTO: 2.1 K/UL (ref 1.8–7.7)
NEUTROPHILS NFR BLD: 51.1 % (ref 38–73)
NRBC BLD-RTO: 0 /100 WBC
PLATELET # BLD AUTO: 278 K/UL (ref 150–350)
PMV BLD AUTO: 9.6 FL (ref 9.2–12.9)
POTASSIUM SERPL-SCNC: 4.6 MMOL/L (ref 3.5–5.1)
PROT SERPL-MCNC: 5.6 G/DL (ref 6–8.4)
RBC # BLD AUTO: 3.63 M/UL (ref 4.6–6.2)
SODIUM SERPL-SCNC: 141 MMOL/L (ref 136–145)
T4 FREE SERPL-MCNC: 0.93 NG/DL (ref 0.71–1.51)
TSH SERPL DL<=0.005 MIU/L-ACNC: 5.01 UIU/ML (ref 0.4–4)
WBC # BLD AUTO: 4.09 K/UL (ref 3.9–12.7)

## 2020-12-11 PROCEDURE — G0179 PR HOME HEALTH MD RECERTIFICATION: ICD-10-PCS | Mod: ,,, | Performed by: NURSE PRACTITIONER

## 2020-12-11 PROCEDURE — 85025 COMPLETE CBC W/AUTO DIFF WBC: CPT

## 2020-12-11 PROCEDURE — 80053 COMPREHEN METABOLIC PANEL: CPT

## 2020-12-11 PROCEDURE — 84439 ASSAY OF FREE THYROXINE: CPT

## 2020-12-11 PROCEDURE — G0179 MD RECERTIFICATION HHA PT: HCPCS | Mod: ,,, | Performed by: NURSE PRACTITIONER

## 2020-12-11 PROCEDURE — 84443 ASSAY THYROID STIM HORMONE: CPT

## 2020-12-16 DIAGNOSIS — I10 ESSENTIAL HYPERTENSION: Primary | ICD-10-CM

## 2020-12-16 DIAGNOSIS — M10.9 GOUT, ARTHRITIS: ICD-10-CM

## 2020-12-16 NOTE — TELEPHONE ENCOUNTER
----- Message from Debby Archibald MA sent at 12/16/2020  2:58 PM CST -----  Home Health nurse stated Pt is complaining of Gout pain and wanting something called in for pain   Call back # [phone] Sandra

## 2020-12-16 NOTE — TELEPHONE ENCOUNTER
Care Due:                  Date            Visit Type   Department     Provider  --------------------------------------------------------------------------------                                ESTABLISHED                              PATIENT      [facility]  Last Visit: 02-      [facility]       MAYRA NOVAK  Next Visit: None Scheduled  None         None Found                                                            Last  Test          Frequency    Reason                     Performed    Due Date  --------------------------------------------------------------------------------    Office Visit  12 months..  COLCRYS, azelastine,       02- 02-                             tamsulosin, traZODone....    Uric Acid...  12 months..  COLCRYS..................  01- 12-    Powered by [employer]. Reference number: [id number]. 12/16/2020 3:13:44 PM   CST

## 2020-12-17 ENCOUNTER — CARE AT HOME (OUTPATIENT)
Dept: HOME HEALTH SERVICES | Facility: CLINIC | Age: 85
End: 2020-12-17

## 2020-12-17 VITALS
DIASTOLIC BLOOD PRESSURE: 70 MMHG | HEART RATE: 80 BPM | WEIGHT: 129 LBS | RESPIRATION RATE: 16 BRPM | SYSTOLIC BLOOD PRESSURE: 136 MMHG | BODY MASS INDEX: 20.2 KG/M2 | TEMPERATURE: 99 F | OXYGEN SATURATION: 96 %

## 2020-12-17 DIAGNOSIS — E44.0 MODERATE PROTEIN-CALORIE MALNUTRITION: ICD-10-CM

## 2020-12-17 DIAGNOSIS — I50.42 CHRONIC COMBINED SYSTOLIC AND DIASTOLIC CONGESTIVE HEART FAILURE: ICD-10-CM

## 2020-12-17 DIAGNOSIS — R41.3 MEMORY LOSS: ICD-10-CM

## 2020-12-17 DIAGNOSIS — R53.1 WEAKNESS: ICD-10-CM

## 2020-12-17 DIAGNOSIS — M10.9 GOUT, ARTHRITIS: Primary | ICD-10-CM

## 2020-12-17 DIAGNOSIS — I70.0 AORTIC ATHEROSCLEROSIS: ICD-10-CM

## 2020-12-17 DIAGNOSIS — E03.8 OTHER SPECIFIED HYPOTHYROIDISM: ICD-10-CM

## 2020-12-17 DIAGNOSIS — D64.9 ANEMIA, UNSPECIFIED TYPE: ICD-10-CM

## 2020-12-17 DIAGNOSIS — Z51.5 PALLIATIVE CARE ENCOUNTER: ICD-10-CM

## 2020-12-17 PROCEDURE — 99350 PR HOME VISIT,ESTAB PATIENT,LEVEL IV: ICD-10-PCS | Mod: S$GLB,,, | Performed by: NURSE PRACTITIONER

## 2020-12-17 PROCEDURE — 99350 HOME/RES VST EST HIGH MDM 60: CPT | Mod: S$GLB,,, | Performed by: NURSE PRACTITIONER

## 2020-12-17 RX ORDER — LEVOTHYROXINE SODIUM 25 UG/1
25 TABLET ORAL
Qty: 30 TABLET | Refills: 1 | Status: SHIPPED | OUTPATIENT
Start: 2020-12-17 | End: 2021-02-02

## 2020-12-17 RX ORDER — ALLOPURINOL 100 MG/1
TABLET ORAL
Qty: 90 TABLET | Refills: 0 | Status: SHIPPED | OUTPATIENT
Start: 2020-12-17 | End: 2021-02-02

## 2020-12-17 NOTE — PROGRESS NOTES
Refill Routing Note   Medication(s) are not appropriate for processing by [facility] for the following reason(s):     - Patient displays non-adherence to medications (has run out of medication supply over 6 months ago)  [facility] action(s):  Defer  Approve  Medication-related problems identified:   Requires labs  Requires appointment  Medication Therapy Plan: Pt requesting medication for gout pain. CDML. appt(annual); labs(CBC, LIPIDS, Uric Acid)   Will follow up with your staff to schedule appointment and labs after your decision.    Medication reconciliation completed: No   Automatic Epic Generated Protocol Data:    Orders Placed This Encounter    CBC Without Differential    Lipid Panel    Uric Acid    allopurinoL (ZYLOPRIM) 100 MG tablet      Requested Prescriptions   Pending Prescriptions Disp Refills    COLCRYS 0.6 mg tablet 90 tablet 0     Sig: TAKE ONE TABLET BY MOUTH ONCE DAILY FOR GOUT       There is no refill protocol information for this order      Signed Prescriptions Disp Refills    allopurinoL (ZYLOPRIM) 100 MG tablet 90 tablet 0     Sig: TAKE 1 TABLET (100 MG TOTAL) BY MOUTH EVERY DAY TO PREVENT GOUT       Endocrinology:  Gout Agents - allopurinol Failed - 12/16/2020  3:13 PM        Failed - Uric Acid within 360 days     Uric Acid   Date Value Ref Range Status   01/02/2019 6.8 3.4 - 7.0 mg/dL Final   08/16/2013 7.7 (H) 3.4 - 7.0 mg/dl Final              Passed - Patient is at least 18 years old        Passed - Office visit in past 12 months or future 90 days     Recent Outpatient Visits            10 months ago DDD (degenerative disc disease), lumbar    [facility] Johny Ortiz MD    1 year ago DDD (degenerative disc disease), lumbar    [facility] Johny Ortiz MD    1 year ago S/P AVR (aortic valve replacement)    [facility] Tanner Galvan MD    1 year ago DDD (degenerative disc disease), lumbar    [facility] Johny  RODERICK Ortiz MD    1 year ago DDD (degenerative disc disease), lumbar    [facility] Johny Ortiz MD                    Passed - Cr is 1.4 or below and within 360 days     Creatinine   Date Value Ref Range Status   12/11/2020 0.8 0.5 - 1.4 mg/dL Final   03/11/2020 0.8 0.5 - 1.4 mg/dL Final   04/10/2019 0.9 0.5 - 1.4 mg/dL Final              Passed - WBC within 360 days     WBC   Date Value Ref Range Status   12/11/2020 4.09 3.90 - 12.70 K/uL Final   03/11/2020 6.13 3.90 - 12.70 K/uL Final   04/10/2019 5.26 3.90 - 12.70 K/uL Final              Passed - RBC within 360 days     RBC   Date Value Ref Range Status   12/11/2020 3.63 (L) 4.60 - 6.20 M/uL Final   03/11/2020 4.10 (L) 4.60 - 6.20 M/uL Final   04/10/2019 3.89 (L) 4.60 - 6.20 M/uL Final              Passed - HGB within 360 days     Hemoglobin   Date Value Ref Range Status   12/11/2020 11.1 (L) 14.0 - 18.0 g/dL Final   03/11/2020 12.4 (L) 14.0 - 18.0 g/dL Final   04/10/2019 12.0 (L) 14.0 - 18.0 g/dL Final              Passed - HCT within 360 days     Hematocrit   Date Value Ref Range Status   12/11/2020 35.6 (L) 40.0 - 54.0 % Final   03/11/2020 40.9 40.0 - 54.0 % Final   04/10/2019 37.1 (L) 40.0 - 54.0 % Final              Passed - PLT within 360 days     Platelets   Date Value Ref Range Status   12/11/2020 278 150 - 350 K/uL Final   03/11/2020 274 150 - 350 K/uL Final   04/10/2019 239 150 - 350 K/uL Final              Passed - ALT is 94 or below and within 360 days     ALT   Date Value Ref Range Status   12/11/2020 15 10 - 44 U/L Final   03/11/2020 12 10 - 44 U/L Final   04/10/2019 12 10 - 44 U/L Final                Passed - AST is 54 or below and within 360 days     AST ([place])   Date Value Ref Range Status   03/08/2016 28 17 - 59 U/L Final     AST   Date Value Ref Range Status   12/11/2020 13 10 - 40 U/L Final   03/11/2020 15 10 - 40 U/L Final   04/10/2019 17 10 - 40 U/L Final              Passed - eGFR is 60 or above and within  360 days     eGFR if non    Date Value Ref Range Status   12/11/2020 >60.0 >60 mL/min/1.73 m^2 Final     Comment:     Calculation used to obtain the estimated glomerular filtration  rate (eGFR) is the CKD-EPI equation.      03/11/2020 >60.0 >60 mL/min/1.73 m^2 Final     Comment:     Calculation used to obtain the estimated glomerular filtration  rate (eGFR) is the CKD-EPI equation.      04/10/2019 >60.0 >60 mL/min/1.73 m^2 Final     Comment:     Calculation used to obtain the estimated glomerular filtration  rate (eGFR) is the CKD-EPI equation.        eGFR if    Date Value Ref Range Status   12/11/2020 >60.0 >60 mL/min/1.73 m^2 Final   03/11/2020 >60.0 >60 mL/min/1.73 m^2 Final   04/10/2019 >60.0 >60 mL/min/1.73 m^2 Final                    Appointments  past 12m or future 3m with PCP    Date Provider   Last Visit   2/14/2020 Johny Ortiz MD   Next Visit   Visit date not found Johny Ortiz MD   ED visits in past 90 days: 0        Note composed:1:58 PM 12/17/2020

## 2020-12-18 NOTE — PROGRESS NOTES
"[facility]  Palliative Care Home Visit    Visit Date: 12/17/2020  Encounter Provider: Leida Brown NP  PCP:  Johny Ortiz MD    Subjective:      Patient ID: Kashmir Parikh is a 88 y.o. male.    Consult Requested By:  Dr. Ortiz  Reason for Consult:  Palliative Care    Kashmir is being seen at home due to physical debility that presents a taxing effort to leave the home, to mitigate high risk of hospital readmission and/or due to the limited availability of reliable or safe options for transportation to the point of access to the provider. Prior to treatment on this visit the chart was reviewed and patient consent was obtained.       Chief Complaint: Follow-up    Kashmir is an 88 year old male with PMHX of CAD, h/o AVR replacement, MI and CABG, bilateral carotid artery stenosis, HTN, BPH, ischemic cardiomyopathy, gout, hyperlipidemia and memory loss.     Kashmir is being seen today for a follow up Palliative Care visit. He is found at home ambulating and is AAO x 3 with some forgetfulness. He tells me that his wife passed away, which we had discussed on last visit. His son Pascual is present today. Kashmir reports that he has been having a gout flare up in his left ankle for 2 days now and re-started Colcrys. Low purine diet discussed.    Recent labs drawn by Home Health indicate hypothyroidism. Will start him on Levothyroxine 25 mcg and check labs in 2 months.     Kashmir reports that his appetite is "fair:.   Weight on 10/6/2020 visit was 130 lbs.  Weight on 11/17/2020 visit is 121 lbs.  Weight today is 129 lbs.  Pascual lives with patient and states that he does get food from the restaurant next door to them daily but sometimes "Dad just doesn't want to eat. I can't make him eat if he doesn't want to". Patient denies nausea, vomiting. Recent labs also show albumin 2.8. Encouraged patient to drink 1-2 Ensure meal supplements daily.        VSS. Denies fever, chest pain, shortness of breath, nausea, " "vomiting, diarrhea. Denies any acute issues, concerns or complaints to address on today's visit. Reports taking all medications as prescribed. No other needs identified at this time. Risks of environmental exposure to coronavirus discussed including: social distancing, hand hygiene, and limiting departures from the home for necessities only.  Reports understanding and willingness to comply.         Goals of care discussed with Kashmir today. He states that he is would like to be a full code at this time. He wishes to stay in his home for the remainder of his life.         Review of Systems   Constitution: Negative for malaise/fatigue.   Eyes: Negative for blurred vision.   Cardiovascular: Negative for chest pain, claudication, cyanosis, dyspnea on exertion, irregular heartbeat, leg swelling, near-syncope, orthopnea, palpitations, paroxysmal nocturnal dyspnea and syncope.   Respiratory: Negative for cough and shortness of breath.    Hematologic/Lymphatic: Does not bruise/bleed easily.   Musculoskeletal: Negative for back pain, falls, joint pain, muscle cramps and myalgias. Positive for chronic shoulder pain "bone on bone" Positive for generalized weakness. Positive for left ankle pain.  Gastrointestinal: Negative for abdominal pain, change in bowel habit, nausea and vomiting.   Genitourinary: Negative for urgency.   Neurological: Negative for dizziness, focal weakness and light-headedness. Positive for memory loss, mild.         Assessments:  · Environmental: single story home, son Pascual lives with patient. Home is cluttered and not clean but with adequate lighting and temperature  · Functional Status: independent  · Safety: fall risk, 3 inside dogs  · Nutritional: has adequate access, reports "fair" appetite  · Home Health/DME/Supplies: [facility]. No DME.       History:  Past Medical History:   Diagnosis Date    Bilateral carotid artery disease 11/18/2016    BPH (benign prostatic hypertrophy)     CAD " (coronary artery disease) 1/8/2013 4/2016 failed  of LAD 12/12 LAD-35%, OM 75%, small RCA    Cardiomyopathy, ischemic 10/20/2017    35%    Cataract     OU    CHF (congestive heart failure) 3/9/2016    Coronary artery disease     Diverticulitis     Gout, arthritis     HEARING LOSS     Heart murmur     asymptomatic    HTN (hypertension)     Pt states resolved    Hyperlipidemia     Myocardial infarction     Recurrent nephrolithiasis     S/P AVR (aortic valve replacement) 1/8/2013    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 mosaic     S/P CABG x 1 1/8/2013 12/12 VG-OM      Family History   Problem Relation Age of Onset    Cancer Mother     Cancer Father      Past Surgical History:   Procedure Laterality Date    AORTIC VALVE REPLACEMENT  12/05/2012    #21 Medtronic Mosaic tissue valve    CARDIAC SURGERY      CARPAL TUNNEL RELEASE  06/06    bilateral    CHOLECYSTECTOMY      COLONOSCOPY      CORONARY ANGIOPLASTY WITH STENT PLACEMENT      EYE SURGERY      retinal detachment x 2    HAND SURGERY      HERNIA REPAIR      umbilical    JOINT REPLACEMENT      bilateral knee    RETINAL DETACHMENT SURGERY Bilateral 1980    RD spontaneous OU    ROTATOR CUFF REPAIR      bilateral    SPINAL CORD STIMULATOR IMPLANT  11/15/2011    SUBTOTAL COLECTOMY       Review of patient's allergies indicates:   Allergen Reactions    Toradol [ketorolac] Other (See Comments)     Renal failure    Pork/porcine containing products Other (See Comments)     gout    Shrimp Other (See Comments)     Gout       Medications:    Current Outpatient Medications:     acetaminophen (TYLENOL) 325 MG tablet, Take 1 tablet (325 mg total) by mouth every 6 (six) hours as needed for Pain., Disp: , Rfl:     allopurinoL (ZYLOPRIM) 100 MG tablet, TAKE 1 TABLET (100 MG TOTAL) BY MOUTH EVERY DAY TO PREVENT GOUT, Disp: 90 tablet, Rfl: 0    aspirin 81 MG Chew, Take 81 mg by mouth once daily., Disp: , Rfl:     atorvastatin (LIPITOR) 40 MG tablet, TAKE 1  TABLET BY MOUTH NIGHTLY, Disp: 90 tablet, Rfl: 4    azelastine (ASTELIN) 137 mcg (0.1 %) nasal spray, 1 spray (137 mcg total) by Nasal route 2 (two) times daily., Disp: 90 mL, Rfl: 3    carvediloL (COREG) 3.125 MG tablet, TAKE 1 TABLET BY MOUTH TWO TIMES A DAY AS DIRECTED, Disp: 180 tablet, Rfl: 4    clopidogreL (PLAVIX) 75 mg tablet, TAKE 1 TABLET BY MOUTH ONCE DAILY, Disp: 90 tablet, Rfl: 4    COLCRYS 0.6 mg tablet, TAKE ONE TABLET BY MOUTH ONCE DAILY FOR GOUT, Disp: 90 tablet, Rfl: 0    docusate sodium (COLACE) 100 MG capsule, Take 100 mg by mouth as needed for Constipation., Disp: , Rfl:     donepeziL (ARICEPT) 10 MG tablet, TAKE 1 TABLET (10 MG TOTAL) BY MOUTH EVERY EVENING, Disp: 30 tablet, Rfl: 3    fluticasone (FLONASE) 50 mcg/actuation nasal spray, 2 sprays (100 mcg total) by Each Nare route once daily., Disp: 1 Bottle, Rfl: 12    multivitamin capsule, Take 1 capsule by mouth once daily., Disp: , Rfl:     omega-3 fatty acids Cap, Take 1 capsule by mouth 2 (two) times daily. , Disp: , Rfl:     tamsulosin (FLOMAX) 0.4 mg Cap, TAKE 1 CAPSULE BY MOUTH ONCE DAILY AS DIRECTED, Disp: 90 capsule, Rfl: 3    traZODone (DESYREL) 100 MG tablet, TAKE 1 TABLET BY MOUTH NIGHTLY AS NEEDED FOR INSOMNIA, Disp: 90 tablet, Rfl: 3    benazepriL (LOTENSIN) 5 MG tablet, TAKE 1 TABLET BY MOUTH ONCE DAILY (Patient not taking: Reported on 11/17/2020), Disp: 90 tablet, Rfl: 4    ketoconazole (NIZORAL) 2 % cream, aaa face twice daily prn scaling and redness (Patient not taking: Reported on 9/11/2020), Disp: 60 g, Rfl: 3    ketoconazole (NIZORAL) 2 % shampoo, Wash face/beard area with medicated shampoo 3x/week (Patient not taking: Reported on 10/21/2019), Disp: 120 mL, Rfl: 5    lactulose (CHRONULAC) 10 gram/15 mL solution, TAKE 30 MLS (20 GRAMS TOTAL) BY MOUTH ONCE DAILY (Patient not taking: Reported on 9/11/2020), Disp: 946 mL, Rfl: 5    levothyroxine (SYNTHROID) 25 MCG tablet, Take 1 tablet (25 mcg total) by mouth  before breakfast., Disp: 30 tablet, Rfl: 1    oxyCODONE-acetaminophen (PERCOCET) 7.5-325 mg per tablet, Take 1 tablet by mouth every 12 (twelve) hours as needed for Pain. (Patient not taking: Reported on 9/11/2020), Disp: 30 tablet, Rfl: 0    predniSONE (DELTASONE) 5 MG tablet, TAKE 1 TABLET (5 MG TOTAL) BY MOUTH DAILY AS NEEDED FOR GOUTY FLARE (Patient not taking: Reported on 12/17/2020), Disp: 30 tablet, Rfl: 6    24h Oral Morphine Equivalents (OME):  N/A    Objective:     Physical Exam:  Vitals:    12/17/20 1140   BP: 136/70   Pulse: 80   Resp: 16   Temp: 98.6 °F (37 °C)   TempSrc: Temporal   SpO2: 96%   Weight: 58.5 kg (129 lb)   PainSc:   3   PainLoc: Foot     Body mass index is 20.2 kg/m².    Physical Exam   Constitutional: He is oriented to person, place, and time. He appears well-developed although thin and pale. He is cooperative.   HENT: Council.  Head: Normocephalic and atraumatic.   Eyes: Conjunctivae are normal. Right eye exhibits no exudate. Left eye exhibits no exudate.   Neck: Normal range of motion. Neck supple. Normal carotid pulses and no JVD present. Carotid bruit is not present. No thyromegaly present.   Cardiovascular: Normal rate, regular rhythm, normal heart sounds and intact distal pulses.   Harsh midsystolic murmur is present at the upper right sternal border radiating to the neck.  Pulses:       Carotid pulses are 2+ on the right side, and 2+ on the left side.       Radial pulses are 2+ on the right side, and 2+ on the left side.        Dorsalis pedis pulses are 2+ on the right side, and 2+ on the left side.        Posterior tibial pulses are 2+ on the right side, and 2+ on the left side.   Well healed midline sternal incision.  Pulmonary/Chest: Effort normal and breath sounds normal.   Abdominal: Soft. Bowel sounds are normal.   Musculoskeletal: Normal range of motion. He exhibits no edema. Generalized weakness.  Neurological: He is alert and oriented to person, place, and time. Gait is  "slow but normal.   Skin: Skin is warm, dry and intact. No cyanosis. Nails show no clubbing.   Psychiatric: He has a normal mood and affect. His speech is normal and behavior is normal. Judgment and thought content normal mostly but is forgetful frequently.       Review of Symptoms     Symptom Assessment (ESAS 0-10 Scale)  Pain:  3  Dyspnea:  0  Anxiety:  0  Nausea:  0  Depression:  0  Anorexia:  0  Fatigue:  2  Insomnia:  0  Restlessness:  0  Agitation:  0      CAM / Delirium:  Negative  Constipation:  Negative  Diarrhea:  Negative     Comments:  Reports has BM once every 2-3 days and this is his "normal" for many years.     Pain Assessment:  Location(s): left ankle     Shoulders       Left ankle       Quality:  Aching       Quantity:  3/10 in intensity       Chronicity:  year(s) ago, unchanged       Aggravating Factors:  Activity       Alleviating Factors:  None       Associated Symptoms:  None     Performance Status:  60     ECOG Performance Status Grade:  1 - Ambulates, capable of light work     Living Arrangements:  Adult son, Pascual Kati, lives with patient     Spiritual:  F - Nikia and Belief:  Religious  I - Importance:  Important  A - Address in Care:  No issues     Time-Based Charting:  Yes  Chart Review: 5 minutes  Face to Face: 30 minutes  Symptom Assessment: 15 minutes  Coordination of Care: 5 minutes  Goals of Care: 5 minutes    Total Time Spent: 60 minutes        Advance Care Planning   Advance Directives:   Living Will: No (forms left with patient and son on last visit)    LaPOST: No    Do Not Resuscitate Status: No    Medical Power of : No (forms left with patient and son on last visit)       Decision Making:  Patient answered questions            Labs:  CBC:   WBC   Date Value Ref Range Status   12/11/2020 4.09 3.90 - 12.70 K/uL Final   alu  Hemoglobin   Date Value Ref Range Status   12/11/2020 11.1 (L) 14.0 - 18.0 g/dL Final   nt  Hematocrit   Date Value Ref Range Status "   12/11/2020 35.6 (L) 40.0 - 54.0 % Final     A  MCV   Date Value Ref Range Status   12/11/2020 98 82 - 98 fL Final   lbumin:   Albumin   Date Value Ref Range Status   12/11/2020 2.8 (L) 3.5 - 5.2 g/dL Final     Protein:   Total Protein   Date Value Ref Range Status   12/11/2020 5.6 (L) 6.0 - 8.4 g/dL Final       Radiology:  I have reviewed all pertinent imaging results/findings within the past 24 hours.      Assessment:     1. Gout, arthritis    2. Other specified hypothyroidism    3. Palliative care encounter    4. Weakness    5. Moderate protein-calorie malnutrition    6. Aortic atherosclerosis    7. Chronic combined systolic and diastolic congestive heart failure    8. Anemia, unspecified type    9. Memory loss        Plan:   Kashmir was seen today for follow-up.    Diagnoses and all orders for this visit:    Gout, arthritis    Other specified hypothyroidism    Palliative care encounter    Weakness    Moderate protein-calorie malnutrition    Aortic atherosclerosis    Chronic combined systolic and diastolic congestive heart failure    Anemia, unspecified type    Memory loss    Other orders  -     levothyroxine (SYNTHROID) 25 MCG tablet; Take 1 tablet (25 mcg total) by mouth before breakfast.      Problem List Items Addressed This Visit        Neuro    Memory loss     Chronic. On Aricept.  Home Health present in home to help medication management.             Cardiac/Vascular    Chronic congestive heart failure     Chronic and stable.  Weigh daily and record.  Regular activity within patient's limitations.  Low salt, low fat and low choleterol diet and restrict fluid < 2L per day.  Contact for SOB, chest pain, weight gain > 2-3 lbs per day and/or 5-6 lbs per week.   No smoking. Annual influenza vaccine required. Take all medications as prescribed.  Reinforced importance of medication and diet compliance.            Aortic atherosclerosis     Chronic and stable.   Noted on CTA chest 3/8/2016.            Oncology     Anemia     Chronic and stable.              Endocrine    Other specified hypothyroidism     Begin levothyroxine 25 mcg daily.  Instructions given to take on empty stomach in the mornings and wait an hour before any other intake.  Will re-check TSH in 2 months.         Moderate protein-calorie malnutrition     Albumin: 2.8/ Total protein: 5.6 12/11/2020 labs.  Eating 2 meals per day, small portions.  TSH abnormal. Started on Levothyroxine.  Encouraged to drink 1-2 cans of Ensure daily as meal supplementation.  Weight weekly and keep log.            Orthopedic    Gout, arthritis - Primary     Left ankle gout flare up reported today.  Taking meds as prescribed.  Discussed low purine diet.             Other    Palliative care encounter     Full code. No ACP documents in place although discussed on previous visit and left with patient to review and complete. Encouraged to complete forms and allowed time for questions.          Weakness     Chronic.  Fall precautions and safety advised.  Continue working with Home Health PT.                 Were controlled substances prescribed?  No    > 50% of 60 min visit spent in chart review, face to face discussion of goals of care,  symptom assessment, coordination of care and emotional support. Topics discussed today include: hypothyroidism, protein calorie malnutrition, debility, fall precautions and safety, anemia.     Follow Up Appointments:   No future appointments.    Verbal consent for visit obtained from patient today.     Signature:  Leida Brown NP    Attestation:   Screening criteria to assess the level of the patient's risk for infection with COVID-19 as recommended by the CDC at the time of the above documented home visit concluded appropriateness to proceed. Universal precautions were maintained at all times, including provider wearing a mask and gloves during entire visit and use of 60% alcohol gel hand  immediately prior to entry and upon departure of  patient's home as well as cleaning of equipment used in home visit with antibacterial/germicidal disposable wipes.

## 2020-12-20 PROBLEM — E03.8 OTHER SPECIFIED HYPOTHYROIDISM: Status: ACTIVE | Noted: 2020-12-20

## 2020-12-20 PROBLEM — E44.0 MODERATE PROTEIN-CALORIE MALNUTRITION: Status: ACTIVE | Noted: 2020-12-20

## 2020-12-20 PROBLEM — D64.9 ANEMIA: Status: ACTIVE | Noted: 2020-12-20

## 2020-12-21 RX ORDER — COLCHICINE 0.6 MG/1
TABLET, FILM COATED ORAL
Qty: 90 TABLET | Refills: 0 | Status: SHIPPED | OUTPATIENT
Start: 2020-12-21 | End: 2021-03-05

## 2020-12-21 NOTE — PATIENT INSTRUCTIONS
Patient Instructions:  - [facility] Nurse Practitioner to schedule home follow-up visit with patient in 4-6 weeks or as needed.  - Continue all medications, treatments and therapies as ordered.   - Follow all instructions, recommendations as discussed.  - Maintain Safety Precautions at all times.  - Attend all medical appointments as scheduled.  - For worsening symptoms: call Primary Care Physician or Nurse Practitioner.  - For emergencies, call 911 or immediately report to the nearest emergency room.  - Limit Risks of environmental exposure to coronavirus/COVID-19 as discussed including: social distancing, hand hygiene, and limiting departures from the home for necessities only.       Fall Prevention  Falls often occur due to slipping, tripping or losing your balance. Millions of people fall every year and injure themselves. Here are ways to reduce your risk of falling again.  · Think about your fall, was there anything that caused your fall that can be fixed, removed, or replaced?  · Make your home safe by keeping walkways clear of objects you may trip over.  · Use non-slip pads under rugs. Do not use area rugs or small throw rugs.  · Use non-slip mats in bathtubs and showers.  · Install handrails and lights on staircases.  · Do not walk in poorly lit areas.  · Do not stand on chairs or wobbly ladders.  · Use caution when reaching overhead or looking upward. This position can cause a loss of balance.  · Be sure your shoes fit properly, have non-slip bottoms and are in good condition.   · Wear shoes both inside and out. Avoid going barefoot or wearing slippers.  · Be cautious when going up and down stairs, curbs, and when walking on uneven sidewalks.  · If your balance is poor, consider using a cane or walker.  · If your fall was related to alcohol use, stop or limit alcohol intake.   · If your fall was related to use of sleeping medicines, talk to your doctor about this. You may need to reduce your dosage at bedtime  if you awaken during the night to go to the bathroom.    · To reduce the need for nighttime bathroom trips:  ¨ Avoid drinking fluids for several hours before going to bed  ¨ Empty your bladder before going to bed  ¨ Men can keep a urinal at the bedside  · Stay as active as you can. Balance, flexibility, strength, and endurance all come from exercise. They all play a role in preventing falls. Ask your healthcare provider which types of activity are right for you.  · Get your vision checked on a regular basis.  · If you have pets, know where they are before you stand up or walk so you don't trip over them.  · Use night lights.  Date Last Reviewed: 11/5/2015  © 8589-8168 [employer]. [address] [place] [address], [place] [address]. All rights reserved. This information is not intended as a substitute for professional medical care. Always follow your healthcare professional's instructions.        Levothyroxine tablets  What is this medicine?  LEVOTHYROXINE (isak voe thye CADENCE een) is a thyroid hormone. This medicine can improve symptoms of thyroid deficiency such as slow speech, lack of energy, weight gain, hair loss, dry skin, and feeling cold. It also helps to treat goiter (an enlarged thyroid gland). It is also used to treat some kinds of thyroid cancer along with surgery and other medicines.  How should I use this medicine?  Take this medicine by mouth with plenty of water. It is best to take on an empty stomach, at least 30 minutes before or 2 hours after food. Follow the directions on the prescription label. Take at the same time each day. Do not take your medicine more often than directed.  Contact your pediatrician regarding the use of this medicine in children. While this drug may be prescribed for children and infants as young as a few days of age for selected conditions, precautions do apply. For infants, you may crush the tablet and place in a small amount of (5-10 ml or 1 to 2 teaspoonfuls) of water,  breast milk, or non-soy based infant formula. Do not mix with soy-based infant formula. Give as directed.  What side effects may I notice from receiving this medicine?  Side effects that you should report to your doctor or health care professional as soon as possible:  · allergic reactions like skin rash, itching or hives, swelling of the face, lips, or tongue  · chest pain  · excessive sweating or intolerance to heat  · fast or irregular heartbeat  · nervousness  · skin rash or hives  · swelling of ankles, feet, or legs  · tremors  Side effects that usually do not require medical attention (report to your doctor or health care professional if they continue or are bothersome):  · changes in appetite  · changes in menstrual periods  · diarrhea  · hair loss  · headache  · trouble sleeping  · weight loss  What may interact with this medicine?  · amiodarone  · antacids  · anti-thyroid medicines  · calcium supplements  · carbamazepine  · cholestyramine  · colestipol  · digoxin  · female hormones, including contraceptive or birth control pills  · iron supplements  · ketamine  · liquid nutrition products like Ensure  · medicines for colds and breathing difficulties  · medicines for diabetes  · medicines for mental depression  · medicines or herbals used to decrease weight or appetite  · phenobarbital or other barbiturate medications  · phenytoin  · prednisone or other corticosteroids  · rifabutin  · rifampin  · soy isoflavones  · sucralfate  · theophylline  · warfarin  What if I miss a dose?  If you miss a dose, take it as soon as you can. If it is almost time for your next dose, take only that dose. Do not take double or extra doses.  Where should I keep my medicine?  Keep out of the reach of children.  Store at room temperature between 15 and 30 degrees C (59 and 86 degrees F). Protect from light and moisture. Keep container tightly closed. Throw away any unused medicine after the expiration date.  What should I tell my  health care provider before I take this medicine?  They need to know if you have any of these conditions:  · angina  · blood clotting problems  · diabetes  · dieting or on a weight loss program  · fertility problems  · heart disease  · high levels of thyroid hormone  · pituitary gland problem  · previous heart attack  · an unusual or allergic reaction to levothyroxine, thyroid hormones, other medicines, foods, dyes, or preservatives  · pregnant or trying to get pregnant  · breast-feeding  What should I watch for while using this medicine?  Be sure to take this medicine with plenty of fluids. Some tablets may cause choking, gagging, or difficulty swallowing from the tablet getting stuck in your throat. Most of these problems disappear if the medicine is taken with the right amount of water or other fluids.  Do not switch brands of this medicine unless your health care professional agrees with the change. Ask questions if you are uncertain.  You will need regular exams and occasional blood tests to check the response to treatment. If you are receiving this medicine for an underactive thyroid, it may be several weeks before you notice an improvement. Check with your doctor or health care professional if your symptoms do not improve.  It may be necessary for you to take this medicine for the rest of your life. Do not stop using this medicine unless your doctor or health care professional advises you to.  This medicine can affect blood sugar levels. If you have diabetes, check your blood sugar as directed.  You may lose some of your hair when you first start treatment. With time, this usually corrects itself.  If you are going to have surgery, tell your doctor or health care professional that you are taking this medicine.  NOTE:This sheet is a summary. It may not cover all possible information. If you have questions about this medicine, talk to your doctor, pharmacist, or health care provider. Copyright© 2017 [employer]        Hypothyroidism       You have hypothyroidism. This means your thyroid gland is not making enough thyroid hormone. This hormone is vital to body growth and metabolism. If you dont make enough, many body processes slow down. This can cause symptoms throughout the body. Hypothyroidism can range from mild to severe. The most severe form is called myxedema.  There are a number of causes of hypothyroidism. A common cause is Hashimotos disease. This disease causes the bodys own immune system to attack the thyroid gland. When you have certain treatments, such as surgery to remove the thyroid gland, this can also cause hypothyroidism.  Symptoms of hypothyroidism can include:  · Fatigue  · Trouble concentrating or thinking clearly; forgetfulness  · Dry skin  · Hair loss  · Weight gain  · Low tolerance to cold  · Constipation  · Depression  · Personality changes  · Tingling or prickling of the hands or feet  · Heavy, absent, or irregular periods (women only)  Older adults may sometimes have other symptoms. These can include:  · Muscle aches and weakness  · Confusion  · Incontinence (unable to control urine or stool)  · Trouble moving around  · Falling  Treatment for hypothyroidism involves taking thyroid hormone pills daily. These pills replace the hormone your thyroid doesnt make. You will likely need to take a daily pill for the rest of your life. Tips for taking this medicine are given below.  Home care  Tips for taking your medicine  · Take your thyroid hormone pills as prescribed by your healthcare provider. This is most often 1 pill a day on an empty stomach. Use a pillbox labeled with the days of the week. This will help you remember to take your pill each day.  · Dont take products that contain iron and calcium or antacids within 4 hours of taking your thyroid hormone pills.  · Dont take other medicines with your thyroid hormone pill without checking with your provider first.  · Tell your provider  if you have any side effects from your medicines that bother you.  · Never change the dosage or stop taking your thyroid pills without talking to your provider first.  General care  · Always talk with your provider before trying other medicines or treatments for your thyroid problem.  · If you see other healthcare providers, be sure to let them know about your thyroid problem.  Follow-up care  See your healthcare provider for checkups as advised. You may need regular tests to check the level of thyroid hormone in your blood.  When to seek medical advice  Call your healthcare provider right away if any of these occur:  · New symptoms develop  · Symptoms return, continue, or worsen even after treatment  · Extreme fatigue  · Puffy hands, face, or feet  · Fast or irregular heartbeat  · Confusion  Call 911  Call 911 right away if any of these occur:  · Fainting  · Chest pain  · Shortness of breath or trouble breathing  Date Last Reviewed: 8/24/2015  © 4561-9336 [employer]. [address] [place] [address]. All rights reserved. This information is not intended as a substitute for professional medical care. Always follow your healthcare professional's instructions.          Anemia  Anemia is a condition that occurs when your body does not have enough healthy red blood cells (RBCs). RBCs are the parts of your blood that carry oxygen throughout your body. A protein called hemoglobin allows your RBCs to absorb and release oxygen. Without enough RBCs or hemoglobin, your body doesn't get enough oxygen. Symptoms of anemia may then occur.    What are the symptoms of anemia?  Some people with anemia have no symptoms. But most people have symptoms that range from mild to severe. These can include:  · Tiredness (fatigue)  · Weakness  · Pale skin  · Shortness of breath  · Dizziness or fainting  · Rapid heartbeat  · Trouble doing normal amounts of activity  · Jaundice (yellowing of your eyes, skin, or mouth;  dark urine)  What causes anemia?  Anemia can occur when your body:  · Loses too much blood  · Does not make enough RBCs  · Destroys your RBCs at a faster rate than it can replace them  · Does not make a normal amount of hemoglobin in your RBCs  These problems can occur for many reasons, including:  · A condition that you are born with (congenital or inherited), such as sickle cell disease or thalassemia  · Heavy bleeding for any reason, including injury, surgery, childbirth, or even heavy menstrual periods  · Being low in certain nutrients, such as iron, folate, or vitamin B12, possibly from a poor diet or a condition like celiac disease or Crohn's disease  · Certain chronic conditions like diabetes, arthritis, or kidney disease  · Certain chronic infections like tuberculosis or HIV  · Exposure to certain medicines, such as those used for chemotherapy  There are different types of anemia. Your healthcare provider can tell you more about the type of anemia you have and what may have caused it.  How is anemia diagnosed?  To diagnose anemia, your healthcare provider orders blood tests. These can include:  · Complete blood cell count (CBC). This test measures the amounts of the different types of blood cells.  · Blood smear. This test checks the size and shape of your blood cells. To do the test, a drop of your blood is viewed under a microscope. A stain is used to make the blood cells easier to see.  · Iron studies. These tests measure the amount of iron in your blood. Your body needs iron to make hemoglobin in your RBCs.  · Vitamin B12 and folate studies. These tests check for some of the components that help give RBCs a normal size and shape.  · Reticulocyte count. This test measures the amount of new RBCs that your bone marrow makes.  · Hemoglobin electrophoresis. This test checks for problems with your hemoglobin in RBCs.  How is anemia treated?  Treatment for anemia is based on the type of anemia, its cause, and  the severity of your symptoms. Treatments may include:  · Diet changes. This involves increasing the amount of certain nutrients in your diet, such as iron, vitamin B12, or folate. Your healthcare provider may also prescribe nutrient supplements.  · Medicines. Certain medicines treat the cause of your anemia. Others help build new RBCs or relieve symptoms. If a medicine is the cause of your anemia, you may need to stop or change it.  · Blood transfusions. Replacing some of your blood can increase the number of healthy RBCs in your body.  · Surgery. In some cases, your doctor may do surgery to treat the underlying cause of anemia. If you need surgery, your healthcare provider will explain the procedure and outline the risks and benefits for you.  What are the long-term concerns?  If you have a certain type of anemia, you can expect a full recovery after treatment. If you have other types of anemia (especially a type you're born with), you will need to manage it for life. Your doctor can tell you more.  Date Last Reviewed: 12/1/2016  © 9981-9785 [employer]. [address] [place] [address], [place] [address]. All rights reserved. This information is not intended as a substitute for professional medical care. Always follow your healthcare professional's instructions.

## 2020-12-23 ENCOUNTER — EXTERNAL HOME HEALTH (OUTPATIENT)
Dept: HOME HEALTH SERVICES | Facility: HOSPITAL | Age: 85
End: 2020-12-23

## 2020-12-23 RX ORDER — OXYCODONE AND ACETAMINOPHEN 7.5; 325 MG/1; MG/1
1 TABLET ORAL EVERY 12 HOURS PRN
Qty: 30 TABLET | Refills: 0 | Status: SHIPPED | OUTPATIENT
Start: 2020-12-23 | End: 2021-02-02

## 2020-12-23 NOTE — TELEPHONE ENCOUNTER
----- Message from Caitlin Gregory sent at 12/23/2020  9:30 AM CST -----  Contact: pt's son Pascual  Type:  RX Refill Request    Who Called:  pt's son Pascual  Refill or New Rx:  refill   RX Name and Strength:  oxyCODONE-acetaminophen (PERCOCET) 7.5-325 mg per tablet  How is the patient currently taking it? (ex. 1XDay):  prn  Is this a 30 day or 90 day RX:  30  Preferred Pharmacy with phone number:- [facility] - [place], [place] - [address] [facility] [address];  Local or Mail Order:  local  Ordering Provider:  Dr. Ortiz  Best Call Back Number:  [phone]  Additional Information:  Patient has been out of this medication for awhile according to his son Pascual   He is in a lot of pain and asking for a refill.

## 2020-12-24 ENCOUNTER — DOCUMENT SCAN (OUTPATIENT)
Dept: HOME HEALTH SERVICES | Facility: HOSPITAL | Age: 85
End: 2020-12-24

## 2020-12-28 NOTE — TELEPHONE ENCOUNTER
Provider Staff:     Action is required for this patient.     Please schedule patient for Annual and Labs (LIPIDS, Uric Acid)    Thanks!  [facility]     Appointments  past 12m or future 3m with PCP    Date Provider   Last Visit   2/14/2020 Johny Ortiz MD   Next Visit   Visit date not found Johny Ortiz MD     Note composed:10:08 AM 12/28/2020

## 2021-01-08 ENCOUNTER — DOCUMENT SCAN (OUTPATIENT)
Dept: HOME HEALTH SERVICES | Facility: HOSPITAL | Age: 86
End: 2021-01-08
Payer: MEDICARE

## 2021-01-15 ENCOUNTER — DOCUMENT SCAN (OUTPATIENT)
Dept: HOME HEALTH SERVICES | Facility: HOSPITAL | Age: 86
End: 2021-01-15
Payer: MEDICARE

## 2021-01-26 ENCOUNTER — CARE AT HOME (OUTPATIENT)
Dept: HOME HEALTH SERVICES | Facility: CLINIC | Age: 86
End: 2021-01-26
Payer: MEDICARE

## 2021-01-26 VITALS
BODY MASS INDEX: 20.67 KG/M2 | WEIGHT: 132 LBS | TEMPERATURE: 99 F | HEART RATE: 82 BPM | SYSTOLIC BLOOD PRESSURE: 126 MMHG | DIASTOLIC BLOOD PRESSURE: 70 MMHG | RESPIRATION RATE: 16 BRPM | OXYGEN SATURATION: 97 %

## 2021-01-26 DIAGNOSIS — I70.0 AORTIC ATHEROSCLEROSIS: ICD-10-CM

## 2021-01-26 DIAGNOSIS — I10 ESSENTIAL HYPERTENSION: ICD-10-CM

## 2021-01-26 DIAGNOSIS — H35.3222 EXUDATIVE AGE-RELATED MACULAR DEGENERATION OF LEFT EYE WITH INACTIVE CHOROIDAL NEOVASCULARIZATION: ICD-10-CM

## 2021-01-26 DIAGNOSIS — I25.5 CARDIOMYOPATHY, ISCHEMIC: Chronic | ICD-10-CM

## 2021-01-26 DIAGNOSIS — R53.1 WEAKNESS: ICD-10-CM

## 2021-01-26 DIAGNOSIS — Z51.5 PALLIATIVE CARE ENCOUNTER: ICD-10-CM

## 2021-01-26 DIAGNOSIS — Z91.81 HISTORY OF RECENT FALL: ICD-10-CM

## 2021-01-26 DIAGNOSIS — I50.42 CHRONIC COMBINED SYSTOLIC AND DIASTOLIC CONGESTIVE HEART FAILURE: ICD-10-CM

## 2021-01-26 DIAGNOSIS — Z95.2 S/P AVR (AORTIC VALVE REPLACEMENT): ICD-10-CM

## 2021-01-26 DIAGNOSIS — H91.93 BILATERAL HEARING LOSS, UNSPECIFIED HEARING LOSS TYPE: ICD-10-CM

## 2021-01-26 DIAGNOSIS — E03.8 OTHER SPECIFIED HYPOTHYROIDISM: ICD-10-CM

## 2021-01-26 DIAGNOSIS — E44.0 MODERATE PROTEIN-CALORIE MALNUTRITION: ICD-10-CM

## 2021-01-26 DIAGNOSIS — I25.10 CORONARY ARTERY DISEASE INVOLVING NATIVE CORONARY ARTERY OF NATIVE HEART WITHOUT ANGINA PECTORIS: ICD-10-CM

## 2021-01-26 DIAGNOSIS — R41.3 MEMORY LOSS: Primary | ICD-10-CM

## 2021-01-26 DIAGNOSIS — M10.9 GOUT, ARTHRITIS: ICD-10-CM

## 2021-01-26 PROCEDURE — 99350 PR HOME VISIT,ESTAB PATIENT,LEVEL IV: ICD-10-PCS | Mod: S$GLB,,, | Performed by: NURSE PRACTITIONER

## 2021-01-26 PROCEDURE — 99350 HOME/RES VST EST HIGH MDM 60: CPT | Mod: S$GLB,,, | Performed by: NURSE PRACTITIONER

## 2021-01-29 ENCOUNTER — TELEPHONE (OUTPATIENT)
Dept: FAMILY MEDICINE | Facility: CLINIC | Age: 86
End: 2021-01-29

## 2021-02-02 ENCOUNTER — OFFICE VISIT (OUTPATIENT)
Dept: FAMILY MEDICINE | Facility: CLINIC | Age: 86
End: 2021-02-02
Payer: MEDICARE

## 2021-02-02 VITALS
HEART RATE: 96 BPM | HEIGHT: 67 IN | BODY MASS INDEX: 21.14 KG/M2 | DIASTOLIC BLOOD PRESSURE: 68 MMHG | OXYGEN SATURATION: 96 % | SYSTOLIC BLOOD PRESSURE: 132 MMHG | WEIGHT: 134.69 LBS

## 2021-02-02 DIAGNOSIS — I10 ESSENTIAL HYPERTENSION: ICD-10-CM

## 2021-02-02 DIAGNOSIS — R41.3 MEMORY LOSS: Primary | ICD-10-CM

## 2021-02-02 DIAGNOSIS — Z95.1 S/P CABG X 1: ICD-10-CM

## 2021-02-02 DIAGNOSIS — I25.5 CARDIOMYOPATHY, ISCHEMIC: Chronic | ICD-10-CM

## 2021-02-02 DIAGNOSIS — I50.42 CHRONIC COMBINED SYSTOLIC AND DIASTOLIC CONGESTIVE HEART FAILURE: ICD-10-CM

## 2021-02-02 DIAGNOSIS — Z95.2 S/P AVR (AORTIC VALVE REPLACEMENT): ICD-10-CM

## 2021-02-02 PROCEDURE — 99999 PR PBB SHADOW E&M-EST. PATIENT-LVL IV: CPT | Mod: PBBFAC,,, | Performed by: INTERNAL MEDICINE

## 2021-02-02 PROCEDURE — 99214 OFFICE O/P EST MOD 30 MIN: CPT | Mod: S$GLB,,, | Performed by: INTERNAL MEDICINE

## 2021-02-02 PROCEDURE — 1159F MED LIST DOCD IN RCRD: CPT | Mod: S$GLB,,, | Performed by: INTERNAL MEDICINE

## 2021-02-02 PROCEDURE — 99999 PR PBB SHADOW E&M-EST. PATIENT-LVL IV: ICD-10-PCS | Mod: PBBFAC,,, | Performed by: INTERNAL MEDICINE

## 2021-02-02 PROCEDURE — 1126F AMNT PAIN NOTED NONE PRSNT: CPT | Mod: S$GLB,,, | Performed by: INTERNAL MEDICINE

## 2021-02-02 PROCEDURE — 99214 PR OFFICE/OUTPT VISIT, EST, LEVL IV, 30-39 MIN: ICD-10-PCS | Mod: S$GLB,,, | Performed by: INTERNAL MEDICINE

## 2021-02-02 PROCEDURE — 1126F PR PAIN SEVERITY QUANTIFIED, NO PAIN PRESENT: ICD-10-PCS | Mod: S$GLB,,, | Performed by: INTERNAL MEDICINE

## 2021-02-02 PROCEDURE — 1159F PR MEDICATION LIST DOCUMENTED IN MEDICAL RECORD: ICD-10-PCS | Mod: S$GLB,,, | Performed by: INTERNAL MEDICINE

## 2021-02-05 ENCOUNTER — DOCUMENT SCAN (OUTPATIENT)
Dept: HOME HEALTH SERVICES | Facility: HOSPITAL | Age: 86
End: 2021-02-05
Payer: MEDICARE

## 2021-02-18 PROBLEM — G45.9 TIA (TRANSIENT ISCHEMIC ATTACK): Status: ACTIVE | Noted: 2021-02-18

## 2021-02-19 PROBLEM — I63.9 CVA (CEREBRAL VASCULAR ACCIDENT): Status: ACTIVE | Noted: 2021-02-19

## 2021-02-19 PROBLEM — I63.9 ACUTE CVA (CEREBROVASCULAR ACCIDENT): Status: ACTIVE | Noted: 2021-02-19

## 2021-02-20 PROBLEM — R41.82 AMS (ALTERED MENTAL STATUS): Status: ACTIVE | Noted: 2021-02-20

## 2021-02-22 PROCEDURE — G0180 MD CERTIFICATION HHA PATIENT: HCPCS | Mod: ,,, | Performed by: INTERNAL MEDICINE

## 2021-02-22 PROCEDURE — G0180 PR HOME HEALTH MD CERTIFICATION: ICD-10-PCS | Mod: ,,, | Performed by: INTERNAL MEDICINE

## 2021-02-23 ENCOUNTER — TELEPHONE (OUTPATIENT)
Dept: FAMILY MEDICINE | Facility: CLINIC | Age: 86
End: 2021-02-23

## 2021-02-23 ENCOUNTER — TELEPHONE (OUTPATIENT)
Dept: NEUROLOGY | Facility: CLINIC | Age: 86
End: 2021-02-23

## 2021-02-23 DIAGNOSIS — I50.42 CHRONIC COMBINED SYSTOLIC AND DIASTOLIC CONGESTIVE HEART FAILURE: ICD-10-CM

## 2021-02-23 DIAGNOSIS — R41.3 MEMORY LOSS: Primary | ICD-10-CM

## 2021-03-01 RX ORDER — AZELASTINE 1 MG/ML
SPRAY, METERED NASAL
Qty: 30 ML | Refills: 3 | Status: SHIPPED | OUTPATIENT
Start: 2021-03-01 | End: 2021-03-12

## 2021-03-01 RX ORDER — ALLOPURINOL 100 MG/1
TABLET ORAL
Qty: 90 TABLET | Refills: 0 | Status: SHIPPED | OUTPATIENT
Start: 2021-03-01 | End: 2021-03-05

## 2021-03-05 ENCOUNTER — CARE AT HOME (OUTPATIENT)
Dept: HOME HEALTH SERVICES | Facility: CLINIC | Age: 86
End: 2021-03-05
Payer: MEDICARE

## 2021-03-05 VITALS
HEIGHT: 68 IN | DIASTOLIC BLOOD PRESSURE: 70 MMHG | TEMPERATURE: 98 F | SYSTOLIC BLOOD PRESSURE: 140 MMHG | HEART RATE: 80 BPM | BODY MASS INDEX: 19.43 KG/M2 | OXYGEN SATURATION: 96 % | WEIGHT: 128.19 LBS | RESPIRATION RATE: 16 BRPM

## 2021-03-05 DIAGNOSIS — H91.93 BILATERAL HEARING LOSS, UNSPECIFIED HEARING LOSS TYPE: ICD-10-CM

## 2021-03-05 DIAGNOSIS — I50.42 CHRONIC COMBINED SYSTOLIC AND DIASTOLIC CONGESTIVE HEART FAILURE: ICD-10-CM

## 2021-03-05 DIAGNOSIS — Z51.5 PALLIATIVE CARE ENCOUNTER: ICD-10-CM

## 2021-03-05 DIAGNOSIS — D69.2 SENILE PURPURA: ICD-10-CM

## 2021-03-05 DIAGNOSIS — M19.019 SHOULDER ARTHRITIS: ICD-10-CM

## 2021-03-05 DIAGNOSIS — I63.9 CEREBROVASCULAR ACCIDENT (CVA), UNSPECIFIED MECHANISM: Primary | ICD-10-CM

## 2021-03-05 DIAGNOSIS — R41.3 MEMORY LOSS: ICD-10-CM

## 2021-03-05 DIAGNOSIS — I10 ESSENTIAL HYPERTENSION: ICD-10-CM

## 2021-03-05 DIAGNOSIS — I70.0 AORTIC ATHEROSCLEROSIS: ICD-10-CM

## 2021-03-05 DIAGNOSIS — I25.10 ATHEROSCLEROSIS OF NATIVE CORONARY ARTERY OF NATIVE HEART WITHOUT ANGINA PECTORIS: ICD-10-CM

## 2021-03-05 DIAGNOSIS — R47.89 GARBLED SPEECH: ICD-10-CM

## 2021-03-05 DIAGNOSIS — H35.3222 EXUDATIVE AGE-RELATED MACULAR DEGENERATION OF LEFT EYE WITH INACTIVE CHOROIDAL NEOVASCULARIZATION: ICD-10-CM

## 2021-03-05 DIAGNOSIS — I25.5 CARDIOMYOPATHY, ISCHEMIC: Chronic | ICD-10-CM

## 2021-03-05 DIAGNOSIS — R53.1 WEAKNESS: ICD-10-CM

## 2021-03-05 PROCEDURE — 1159F MED LIST DOCD IN RCRD: CPT | Mod: S$GLB,,, | Performed by: NURSE PRACTITIONER

## 2021-03-05 PROCEDURE — 99350 PR HOME VISIT,ESTAB PATIENT,LEVEL IV: ICD-10-PCS | Mod: S$GLB,,, | Performed by: NURSE PRACTITIONER

## 2021-03-05 PROCEDURE — 1159F PR MEDICATION LIST DOCUMENTED IN MEDICAL RECORD: ICD-10-PCS | Mod: S$GLB,,, | Performed by: NURSE PRACTITIONER

## 2021-03-05 PROCEDURE — 99350 HOME/RES VST EST HIGH MDM 60: CPT | Mod: S$GLB,,, | Performed by: NURSE PRACTITIONER

## 2021-03-08 ENCOUNTER — EXTERNAL HOME HEALTH (OUTPATIENT)
Dept: HOME HEALTH SERVICES | Facility: HOSPITAL | Age: 86
End: 2021-03-08
Payer: MEDICARE

## 2021-03-09 PROBLEM — E44.0 MODERATE PROTEIN-CALORIE MALNUTRITION: Status: RESOLVED | Noted: 2020-12-20 | Resolved: 2021-03-09

## 2021-03-09 PROBLEM — J34.3 NASAL TURBINATE HYPERTROPHY: Status: RESOLVED | Noted: 2018-06-19 | Resolved: 2021-03-09

## 2021-03-09 PROBLEM — T48.5X5A RHINITIS MEDICAMENTOSA: Status: RESOLVED | Noted: 2018-06-19 | Resolved: 2021-03-09

## 2021-03-09 PROBLEM — F11.20 OPIOID TYPE DEPENDENCE, CONTINUOUS: Status: RESOLVED | Noted: 2018-02-06 | Resolved: 2021-03-09

## 2021-03-09 PROBLEM — I25.10 ATHEROSCLEROSIS OF NATIVE CORONARY ARTERY OF NATIVE HEART WITHOUT ANGINA PECTORIS: Status: ACTIVE | Noted: 2021-03-09

## 2021-03-09 PROBLEM — J34.89 NASAL OBSTRUCTION: Status: RESOLVED | Noted: 2018-06-19 | Resolved: 2021-03-09

## 2021-03-09 PROBLEM — J31.0 RHINITIS MEDICAMENTOSA: Status: RESOLVED | Noted: 2018-06-19 | Resolved: 2021-03-09

## 2021-03-09 PROBLEM — R47.89 GARBLED SPEECH: Status: ACTIVE | Noted: 2021-03-09

## 2021-03-09 PROBLEM — J34.2 NASAL SEPTAL DEVIATION: Status: RESOLVED | Noted: 2018-06-19 | Resolved: 2021-03-09

## 2021-03-09 PROBLEM — I25.119 ATHEROSCLEROSIS OF NATIVE CORONARY ARTERY OF NATIVE HEART WITH ANGINA PECTORIS: Status: ACTIVE | Noted: 2021-03-09

## 2021-03-09 PROBLEM — D69.2 SENILE PURPURA: Status: ACTIVE | Noted: 2021-03-09

## 2021-03-09 PROBLEM — F43.21 GRIEF: Status: RESOLVED | Noted: 2020-11-16 | Resolved: 2021-03-09

## 2021-03-12 ENCOUNTER — OFFICE VISIT (OUTPATIENT)
Dept: FAMILY MEDICINE | Facility: CLINIC | Age: 86
End: 2021-03-12
Payer: MEDICARE

## 2021-03-12 VITALS
DIASTOLIC BLOOD PRESSURE: 60 MMHG | HEART RATE: 88 BPM | OXYGEN SATURATION: 95 % | BODY MASS INDEX: 19.41 KG/M2 | WEIGHT: 127.63 LBS | SYSTOLIC BLOOD PRESSURE: 136 MMHG

## 2021-03-12 DIAGNOSIS — G47.00 INSOMNIA, UNSPECIFIED TYPE: ICD-10-CM

## 2021-03-12 DIAGNOSIS — I25.10 CORONARY ARTERY DISEASE INVOLVING NATIVE CORONARY ARTERY OF NATIVE HEART WITHOUT ANGINA PECTORIS: ICD-10-CM

## 2021-03-12 DIAGNOSIS — N40.1 BENIGN PROSTATIC HYPERPLASIA WITH LOWER URINARY TRACT SYMPTOMS, SYMPTOM DETAILS UNSPECIFIED: ICD-10-CM

## 2021-03-12 DIAGNOSIS — I63.9 ACUTE CVA (CEREBROVASCULAR ACCIDENT): Primary | ICD-10-CM

## 2021-03-12 DIAGNOSIS — R63.4 UNINTENTIONAL WEIGHT LOSS: ICD-10-CM

## 2021-03-12 DIAGNOSIS — R41.3 MEMORY LOSS: ICD-10-CM

## 2021-03-12 DIAGNOSIS — I50.42 CHRONIC COMBINED SYSTOLIC AND DIASTOLIC CONGESTIVE HEART FAILURE: ICD-10-CM

## 2021-03-12 DIAGNOSIS — G31.84 MILD COGNITIVE IMPAIRMENT: ICD-10-CM

## 2021-03-12 DIAGNOSIS — I10 ESSENTIAL HYPERTENSION: ICD-10-CM

## 2021-03-12 PROBLEM — G45.9 TIA (TRANSIENT ISCHEMIC ATTACK): Status: RESOLVED | Noted: 2021-02-18 | Resolved: 2021-03-12

## 2021-03-12 PROCEDURE — 1159F PR MEDICATION LIST DOCUMENTED IN MEDICAL RECORD: ICD-10-PCS | Mod: S$GLB,,, | Performed by: INTERNAL MEDICINE

## 2021-03-12 PROCEDURE — 99214 OFFICE O/P EST MOD 30 MIN: CPT | Mod: S$GLB,,, | Performed by: INTERNAL MEDICINE

## 2021-03-12 PROCEDURE — 99214 PR OFFICE/OUTPT VISIT, EST, LEVL IV, 30-39 MIN: ICD-10-PCS | Mod: S$GLB,,, | Performed by: INTERNAL MEDICINE

## 2021-03-12 PROCEDURE — 99999 PR PBB SHADOW E&M-EST. PATIENT-LVL III: ICD-10-PCS | Mod: PBBFAC,,, | Performed by: INTERNAL MEDICINE

## 2021-03-12 PROCEDURE — 1125F AMNT PAIN NOTED PAIN PRSNT: CPT | Mod: S$GLB,,, | Performed by: INTERNAL MEDICINE

## 2021-03-12 PROCEDURE — 99999 PR PBB SHADOW E&M-EST. PATIENT-LVL III: CPT | Mod: PBBFAC,,, | Performed by: INTERNAL MEDICINE

## 2021-03-12 PROCEDURE — 1159F MED LIST DOCD IN RCRD: CPT | Mod: S$GLB,,, | Performed by: INTERNAL MEDICINE

## 2021-03-12 PROCEDURE — 1125F PR PAIN SEVERITY QUANTIFIED, PAIN PRESENT: ICD-10-PCS | Mod: S$GLB,,, | Performed by: INTERNAL MEDICINE

## 2021-03-12 RX ORDER — MIRTAZAPINE 15 MG/1
15 TABLET, FILM COATED ORAL NIGHTLY
Qty: 30 TABLET | Refills: 1 | Status: SHIPPED | OUTPATIENT
Start: 2021-03-12 | End: 2022-05-06 | Stop reason: SDUPTHER

## 2021-03-12 RX ORDER — DONEPEZIL HYDROCHLORIDE 10 MG/1
10 TABLET, FILM COATED ORAL NIGHTLY
Qty: 90 TABLET | Refills: 3 | Status: SHIPPED | OUTPATIENT
Start: 2021-03-12 | End: 2022-05-06 | Stop reason: SDUPTHER

## 2021-03-12 RX ORDER — TAMSULOSIN HYDROCHLORIDE 0.4 MG/1
1 CAPSULE ORAL DAILY
Qty: 90 CAPSULE | Refills: 3 | Status: SHIPPED | OUTPATIENT
Start: 2021-03-12 | End: 2022-05-06 | Stop reason: SDUPTHER

## 2021-03-12 RX ORDER — ATORVASTATIN CALCIUM 40 MG/1
40 TABLET, FILM COATED ORAL NIGHTLY
Qty: 90 TABLET | Refills: 3 | Status: SHIPPED | OUTPATIENT
Start: 2021-03-12 | End: 2022-05-06 | Stop reason: SDUPTHER

## 2021-03-12 RX ORDER — CARVEDILOL 3.12 MG/1
3.12 TABLET ORAL 2 TIMES DAILY
Qty: 180 TABLET | Refills: 3 | Status: SHIPPED | OUTPATIENT
Start: 2021-03-12 | End: 2022-05-06 | Stop reason: SDUPTHER

## 2021-04-05 ENCOUNTER — TELEPHONE (OUTPATIENT)
Dept: FAMILY MEDICINE | Facility: CLINIC | Age: 86
End: 2021-04-05

## 2021-04-05 RX ORDER — COLCHICINE 0.6 MG/1
TABLET, FILM COATED ORAL
Qty: 30 TABLET | Refills: 0 | Status: SHIPPED | OUTPATIENT
Start: 2021-04-05 | End: 2021-04-18

## 2021-04-08 ENCOUNTER — TELEPHONE (OUTPATIENT)
Dept: FAMILY MEDICINE | Facility: CLINIC | Age: 86
End: 2021-04-08

## 2021-04-15 ENCOUNTER — CARE AT HOME (OUTPATIENT)
Dept: HOME HEALTH SERVICES | Facility: CLINIC | Age: 86
End: 2021-04-15
Payer: MEDICARE

## 2021-04-15 VITALS
DIASTOLIC BLOOD PRESSURE: 68 MMHG | BODY MASS INDEX: 18.49 KG/M2 | HEART RATE: 74 BPM | HEIGHT: 68 IN | WEIGHT: 122 LBS | SYSTOLIC BLOOD PRESSURE: 122 MMHG | RESPIRATION RATE: 16 BRPM | TEMPERATURE: 98 F | OXYGEN SATURATION: 97 %

## 2021-04-15 DIAGNOSIS — R63.4 UNINTENTIONAL WEIGHT LOSS: ICD-10-CM

## 2021-04-15 DIAGNOSIS — I25.10 ATHEROSCLEROSIS OF NATIVE CORONARY ARTERY OF NATIVE HEART WITHOUT ANGINA PECTORIS: ICD-10-CM

## 2021-04-15 DIAGNOSIS — I50.42 CHRONIC COMBINED SYSTOLIC AND DIASTOLIC CONGESTIVE HEART FAILURE: ICD-10-CM

## 2021-04-15 DIAGNOSIS — I63.9 CEREBROVASCULAR ACCIDENT (CVA), UNSPECIFIED MECHANISM: Primary | ICD-10-CM

## 2021-04-15 DIAGNOSIS — M10.9 GOUT, ARTHRITIS: ICD-10-CM

## 2021-04-15 DIAGNOSIS — R53.1 WEAKNESS: ICD-10-CM

## 2021-04-15 DIAGNOSIS — I70.0 AORTIC ATHEROSCLEROSIS: ICD-10-CM

## 2021-04-15 DIAGNOSIS — D69.2 SENILE PURPURA: ICD-10-CM

## 2021-04-15 DIAGNOSIS — Z51.5 PALLIATIVE CARE ENCOUNTER: ICD-10-CM

## 2021-04-15 DIAGNOSIS — Z95.2 S/P AVR (AORTIC VALVE REPLACEMENT): ICD-10-CM

## 2021-04-15 DIAGNOSIS — I10 ESSENTIAL HYPERTENSION: ICD-10-CM

## 2021-04-15 DIAGNOSIS — I25.5 CARDIOMYOPATHY, ISCHEMIC: ICD-10-CM

## 2021-04-15 DIAGNOSIS — I25.10 CORONARY ARTERY DISEASE INVOLVING NATIVE CORONARY ARTERY OF NATIVE HEART WITHOUT ANGINA PECTORIS: ICD-10-CM

## 2021-04-15 PROCEDURE — 99350 PR HOME VISIT,ESTAB PATIENT,LEVEL IV: ICD-10-PCS | Mod: S$GLB,,, | Performed by: NURSE PRACTITIONER

## 2021-04-15 PROCEDURE — 1159F MED LIST DOCD IN RCRD: CPT | Mod: S$GLB,,, | Performed by: NURSE PRACTITIONER

## 2021-04-15 PROCEDURE — 99350 HOME/RES VST EST HIGH MDM 60: CPT | Mod: S$GLB,,, | Performed by: NURSE PRACTITIONER

## 2021-04-15 PROCEDURE — 1159F PR MEDICATION LIST DOCUMENTED IN MEDICAL RECORD: ICD-10-PCS | Mod: S$GLB,,, | Performed by: NURSE PRACTITIONER

## 2021-04-18 RX ORDER — COLCHICINE 0.6 MG/1
1 CAPSULE ORAL DAILY PRN
Qty: 30 CAPSULE | Refills: 1 | OUTPATIENT
Start: 2021-04-18 | End: 2021-05-21

## 2021-04-19 ENCOUNTER — PATIENT OUTREACH (OUTPATIENT)
Dept: ADMINISTRATIVE | Facility: OTHER | Age: 86
End: 2021-04-19

## 2021-04-29 ENCOUNTER — DOCUMENT SCAN (OUTPATIENT)
Dept: HOME HEALTH SERVICES | Facility: HOSPITAL | Age: 86
End: 2021-04-29
Payer: MEDICARE

## 2021-04-30 ENCOUNTER — DOCUMENT SCAN (OUTPATIENT)
Dept: HOME HEALTH SERVICES | Facility: HOSPITAL | Age: 86
End: 2021-04-30
Payer: MEDICARE

## 2022-04-01 PROBLEM — I48.92 ATRIAL FLUTTER: Status: ACTIVE | Noted: 2022-04-01

## 2022-04-01 PROBLEM — F03.90 DEMENTIA: Status: ACTIVE | Noted: 2022-04-01
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
## 2022-04-02 PROBLEM — Z71.89 ADVANCED CARE PLANNING/COUNSELING DISCUSSION: Status: RESOLVED | Noted: 2020-09-12 | Resolved: 2022-04-02

## 2022-04-05 PROBLEM — R50.9 FEVER: Status: ACTIVE | Noted: 2022-04-05

## 2022-04-06 PROBLEM — J18.9 PNEUMONIA: Status: ACTIVE | Noted: 2022-04-06

## 2022-04-06 PROBLEM — R50.9 FEVER: Status: RESOLVED | Noted: 2022-04-05 | Resolved: 2022-04-06

## 2022-05-02 PROCEDURE — G0180 PR HOME HEALTH MD CERTIFICATION: ICD-10-PCS | Mod: ,,, | Performed by: INTERNAL MEDICINE

## 2022-05-02 PROCEDURE — G0180 MD CERTIFICATION HHA PATIENT: HCPCS | Mod: ,,, | Performed by: INTERNAL MEDICINE

## 2022-05-06 DIAGNOSIS — I10 ESSENTIAL HYPERTENSION: ICD-10-CM

## 2022-05-06 DIAGNOSIS — I25.10 CORONARY ARTERY DISEASE INVOLVING NATIVE CORONARY ARTERY OF NATIVE HEART WITHOUT ANGINA PECTORIS: ICD-10-CM

## 2022-05-06 DIAGNOSIS — G31.84 MILD COGNITIVE IMPAIRMENT: ICD-10-CM

## 2022-05-06 DIAGNOSIS — N40.1 BENIGN PROSTATIC HYPERPLASIA WITH LOWER URINARY TRACT SYMPTOMS, SYMPTOM DETAILS UNSPECIFIED: ICD-10-CM

## 2022-05-06 DIAGNOSIS — R63.4 UNINTENTIONAL WEIGHT LOSS: ICD-10-CM

## 2022-05-06 DIAGNOSIS — G47.00 INSOMNIA, UNSPECIFIED TYPE: ICD-10-CM

## 2022-05-06 RX ORDER — COLCHICINE 0.6 MG/1
0.6 TABLET ORAL DAILY
Qty: 90 TABLET | Refills: 0 | Status: SHIPPED | OUTPATIENT
Start: 2022-05-06

## 2022-05-06 RX ORDER — DONEPEZIL HYDROCHLORIDE 10 MG/1
10 TABLET, FILM COATED ORAL NIGHTLY
Qty: 90 TABLET | Refills: 0 | Status: SHIPPED | OUTPATIENT
Start: 2022-05-06

## 2022-05-06 RX ORDER — MIRTAZAPINE 15 MG/1
15 TABLET, FILM COATED ORAL NIGHTLY
Qty: 90 TABLET | Refills: 0 | Status: SHIPPED | OUTPATIENT
Start: 2022-05-06 | End: 2023-05-06

## 2022-05-06 RX ORDER — ATORVASTATIN CALCIUM 40 MG/1
40 TABLET, FILM COATED ORAL NIGHTLY
Qty: 90 TABLET | Refills: 0 | Status: SHIPPED | OUTPATIENT
Start: 2022-05-06

## 2022-05-06 RX ORDER — TAMSULOSIN HYDROCHLORIDE 0.4 MG/1
1 CAPSULE ORAL DAILY
Qty: 90 CAPSULE | Refills: 0 | Status: SHIPPED | OUTPATIENT
Start: 2022-05-06

## 2022-05-06 RX ORDER — CARVEDILOL 3.12 MG/1
3.12 TABLET ORAL 2 TIMES DAILY
Qty: 180 TABLET | Refills: 0 | Status: SHIPPED | OUTPATIENT
Start: 2022-05-06

## 2022-05-06 NOTE — TELEPHONE ENCOUNTER
Care Due:                  Date            Visit Type   Department     Provider  --------------------------------------------------------------------------------                                EP -                              [facility]  Last Visit: 03-      CARE ([facility])   DIANA Lyon                              EP -                              PRIMARY      [facility]  Next Visit: 05-      CARE ([facility])   DIANA Lyon                                                            Last  Test          Frequency    Reason                     Performed    Due Date  --------------------------------------------------------------------------------    Lipid Panel.  12 months..  atorvastatin.............  02- 02-    [facility] Embedded Care Gaps. Reference number: [id number]. 5/06/2022   11:01:40 AM CDT

## 2022-05-06 NOTE — TELEPHONE ENCOUNTER
----- Message from Alex Lyon MD sent at 5/6/2022 10:49 AM CDT -----  Contact: home health  Please pend orders. I have not seen the patient in over a year.  ----- Message -----  From: Karuna HENDRICKS Edilberto  Sent: 5/6/2022  10:42 AM CDT  To: Alex Lyon MD    Type:  RX Refill Request    Who Called:  Reanna with Home Health  Refill or New Rx:  refill  RX Name and Strength:    atocolchicine (COLCRYS) 0.6 mg tablet  crvastatin (LIPITOR) 40 MG tablet  carvediloL (COREG) 3.125 MG tablet  donepeziL (ARICEPT) 10 MG tablet  apixaban (ELIQUIS) 2.5 mg Tab  tamsulosin (FLOMAX) 0.4 mg Cap  mirtazapine (REMERON) 15 MG tablet  How is the patient currently taking it? (ex. 1XDay):    Is this a 30 day or 90 day RX:  90  Preferred Pharmacy with phone number:    [facility] - [place] - [address] [address] [facility] [address]  [address] [address] [facility] [address]  [facility] [address]  Phone: [phone] Fax: [phone]  Local or Mail Order:  local  Ordering Provider:    Best Call Back Number:  [phone] Reanna  Additional Information:

## 2022-05-11 ENCOUNTER — EXTERNAL HOME HEALTH (OUTPATIENT)
Dept: HOME HEALTH SERVICES | Facility: HOSPITAL | Age: 87
End: 2022-05-11
Payer: MEDICARE

## 2022-06-03 ENCOUNTER — DOCUMENT SCAN (OUTPATIENT)
Dept: HOME HEALTH SERVICES | Facility: HOSPITAL | Age: 87
End: 2022-06-03
Payer: MEDICARE

## 2022-06-06 ENCOUNTER — DOCUMENT SCAN (OUTPATIENT)
Dept: HOME HEALTH SERVICES | Facility: HOSPITAL | Age: 87
End: 2022-06-06
Payer: MEDICARE

## 2022-06-15 ENCOUNTER — DOCUMENT SCAN (OUTPATIENT)
Dept: HOME HEALTH SERVICES | Facility: HOSPITAL | Age: 87
End: 2022-06-15
Payer: MEDICARE

## 2024-10-10 NOTE — TELEPHONE ENCOUNTER
Make appt with colon surgeon - Dr Isaiah Prince - [phone]    Make appt with oncologist Dr. David Rose     CT scan and blood tests ordered    Call [phone] to schedule the imaging exam at the     [facility] at [address] [address]       Needs appointment, labs per protocol